# Patient Record
Sex: FEMALE | Race: BLACK OR AFRICAN AMERICAN | Employment: FULL TIME | URBAN - METROPOLITAN AREA
[De-identification: names, ages, dates, MRNs, and addresses within clinical notes are randomized per-mention and may not be internally consistent; named-entity substitution may affect disease eponyms.]

---

## 2018-06-27 ENCOUNTER — APPOINTMENT (OUTPATIENT)
Dept: GENERAL RADIOLOGY | Age: 38
End: 2018-06-27
Attending: PHYSICIAN ASSISTANT
Payer: COMMERCIAL

## 2018-06-27 ENCOUNTER — HOSPITAL ENCOUNTER (EMERGENCY)
Age: 38
Discharge: HOME | End: 2018-06-28
Attending: EMERGENCY MEDICINE
Payer: COMMERCIAL

## 2018-06-27 ENCOUNTER — APPOINTMENT (OUTPATIENT)
Dept: GENERAL RADIOLOGY | Age: 38
End: 2018-06-27
Payer: COMMERCIAL

## 2018-06-27 ENCOUNTER — APPOINTMENT (OUTPATIENT)
Dept: GENERAL RADIOLOGY | Age: 38
End: 2018-06-27
Attending: EMERGENCY MEDICINE
Payer: COMMERCIAL

## 2018-06-27 VITALS
OXYGEN SATURATION: 100 % | DIASTOLIC BLOOD PRESSURE: 99 MMHG | SYSTOLIC BLOOD PRESSURE: 147 MMHG | HEIGHT: 67 IN | WEIGHT: 212.3 LBS | HEART RATE: 71 BPM | TEMPERATURE: 98.8 F | BODY MASS INDEX: 33.32 KG/M2 | RESPIRATION RATE: 17 BRPM

## 2018-06-27 DIAGNOSIS — G89.29 CHRONIC RADICULAR LUMBAR PAIN: ICD-10-CM

## 2018-06-27 DIAGNOSIS — M89.8X1 CHRONIC SCAPULAR PAIN: Primary | ICD-10-CM

## 2018-06-27 DIAGNOSIS — R22.32 MASS OF LEFT FOREARM: ICD-10-CM

## 2018-06-27 DIAGNOSIS — M54.16 CHRONIC RADICULAR LUMBAR PAIN: ICD-10-CM

## 2018-06-27 DIAGNOSIS — G89.29 CHRONIC SCAPULAR PAIN: Primary | ICD-10-CM

## 2018-06-27 LAB
ALBUMIN SERPL-MCNC: 3.7 G/DL (ref 3.5–5)
ALBUMIN/GLOB SERPL: 1 {RATIO} (ref 1.1–2.2)
ALP SERPL-CCNC: 78 U/L (ref 45–117)
ALT SERPL-CCNC: 37 U/L (ref 12–78)
ANION GAP SERPL CALC-SCNC: 5 MMOL/L (ref 5–15)
APPEARANCE UR: CLEAR
AST SERPL-CCNC: 26 U/L (ref 15–37)
BACTERIA URNS QL MICRO: NEGATIVE /HPF
BASOPHILS # BLD: 0.1 K/UL (ref 0–0.1)
BASOPHILS NFR BLD: 1 % (ref 0–1)
BILIRUB SERPL-MCNC: 0.3 MG/DL (ref 0.2–1)
BILIRUB UR QL: NEGATIVE
BUN SERPL-MCNC: 8 MG/DL (ref 6–20)
BUN/CREAT SERPL: 9 (ref 12–20)
CALCIUM SERPL-MCNC: 8.9 MG/DL (ref 8.5–10.1)
CHLORIDE SERPL-SCNC: 101 MMOL/L (ref 97–108)
CK MB CFR SERPL CALC: NORMAL % (ref 0–2.5)
CK MB SERPL-MCNC: <1 NG/ML (ref 5–25)
CK SERPL-CCNC: 200 U/L (ref 26–192)
CO2 SERPL-SCNC: 30 MMOL/L (ref 21–32)
COLOR UR: NORMAL
CREAT SERPL-MCNC: 0.88 MG/DL (ref 0.55–1.02)
D DIMER PPP FEU-MCNC: 0.22 MG/L FEU (ref 0–0.65)
DIFFERENTIAL METHOD BLD: ABNORMAL
EOSINOPHIL # BLD: 0.4 K/UL (ref 0–0.4)
EOSINOPHIL NFR BLD: 6 % (ref 0–7)
EPITH CASTS URNS QL MICRO: NORMAL /LPF
ERYTHROCYTE [DISTWIDTH] IN BLOOD BY AUTOMATED COUNT: 13.9 % (ref 11.5–14.5)
GLOBULIN SER CALC-MCNC: 3.8 G/DL (ref 2–4)
GLUCOSE SERPL-MCNC: 109 MG/DL (ref 65–100)
GLUCOSE UR STRIP.AUTO-MCNC: NEGATIVE MG/DL
HCG UR QL: NEGATIVE
HCT VFR BLD AUTO: 34 % (ref 35–47)
HGB BLD-MCNC: 11 G/DL (ref 11.5–16)
HGB UR QL STRIP: NEGATIVE
HYALINE CASTS URNS QL MICRO: NORMAL /LPF (ref 0–5)
IMM GRANULOCYTES # BLD: 0 K/UL (ref 0–0.04)
IMM GRANULOCYTES NFR BLD AUTO: 1 % (ref 0–0.5)
KETONES UR QL STRIP.AUTO: NEGATIVE MG/DL
LEUKOCYTE ESTERASE UR QL STRIP.AUTO: NEGATIVE
LYMPHOCYTES # BLD: 1.3 K/UL (ref 0.8–3.5)
LYMPHOCYTES NFR BLD: 22 % (ref 12–49)
MCH RBC QN AUTO: 27.7 PG (ref 26–34)
MCHC RBC AUTO-ENTMCNC: 32.4 G/DL (ref 30–36.5)
MCV RBC AUTO: 85.6 FL (ref 80–99)
MONOCYTES # BLD: 0.4 K/UL (ref 0–1)
MONOCYTES NFR BLD: 8 % (ref 5–13)
NEUTS SEG # BLD: 3.7 K/UL (ref 1.8–8)
NEUTS SEG NFR BLD: 63 % (ref 32–75)
NITRITE UR QL STRIP.AUTO: NEGATIVE
NRBC # BLD: 0 K/UL (ref 0–0.01)
NRBC BLD-RTO: 0 PER 100 WBC
PH UR STRIP: 6 [PH] (ref 5–8)
PLATELET # BLD AUTO: 483 K/UL (ref 150–400)
PMV BLD AUTO: 10 FL (ref 8.9–12.9)
POTASSIUM SERPL-SCNC: 3.8 MMOL/L (ref 3.5–5.1)
PROT SERPL-MCNC: 7.5 G/DL (ref 6.4–8.2)
PROT UR STRIP-MCNC: NEGATIVE MG/DL
RBC # BLD AUTO: 3.97 M/UL (ref 3.8–5.2)
RBC #/AREA URNS HPF: NORMAL /HPF (ref 0–5)
SODIUM SERPL-SCNC: 136 MMOL/L (ref 136–145)
SP GR UR REFRACTOMETRY: 1.01 (ref 1–1.03)
TROPONIN I SERPL-MCNC: <0.05 NG/ML
UA: UC IF INDICATED,UAUC: NORMAL
UROBILINOGEN UR QL STRIP.AUTO: 0.2 EU/DL (ref 0.2–1)
WBC # BLD AUTO: 5.9 K/UL (ref 3.6–11)
WBC URNS QL MICRO: NORMAL /HPF (ref 0–4)

## 2018-06-27 PROCEDURE — 80053 COMPREHEN METABOLIC PANEL: CPT

## 2018-06-27 PROCEDURE — 81001 URINALYSIS AUTO W/SCOPE: CPT | Performed by: PHYSICIAN ASSISTANT

## 2018-06-27 PROCEDURE — 99284 EMERGENCY DEPT VISIT MOD MDM: CPT

## 2018-06-27 PROCEDURE — 74011250637 HC RX REV CODE- 250/637: Performed by: EMERGENCY MEDICINE

## 2018-06-27 PROCEDURE — 71046 X-RAY EXAM CHEST 2 VIEWS: CPT

## 2018-06-27 PROCEDURE — 82550 ASSAY OF CK (CPK): CPT

## 2018-06-27 PROCEDURE — 84484 ASSAY OF TROPONIN QUANT: CPT

## 2018-06-27 PROCEDURE — 81025 URINE PREGNANCY TEST: CPT | Performed by: PHYSICIAN ASSISTANT

## 2018-06-27 PROCEDURE — 85025 COMPLETE CBC W/AUTO DIFF WBC: CPT

## 2018-06-27 PROCEDURE — 36415 COLL VENOUS BLD VENIPUNCTURE: CPT

## 2018-06-27 PROCEDURE — 93005 ELECTROCARDIOGRAM TRACING: CPT

## 2018-06-27 PROCEDURE — 82553 CREATINE MB FRACTION: CPT

## 2018-06-27 PROCEDURE — 72100 X-RAY EXAM L-S SPINE 2/3 VWS: CPT

## 2018-06-27 PROCEDURE — 85379 FIBRIN DEGRADATION QUANT: CPT

## 2018-06-27 PROCEDURE — 74011636637 HC RX REV CODE- 636/637: Performed by: EMERGENCY MEDICINE

## 2018-06-27 RX ORDER — DIAZEPAM 5 MG/1
5 TABLET ORAL
Status: COMPLETED | OUTPATIENT
Start: 2018-06-27 | End: 2018-06-27

## 2018-06-27 RX ORDER — PAROXETINE HYDROCHLORIDE 20 MG/1
20 TABLET, FILM COATED ORAL DAILY
COMMUNITY
End: 2018-10-26 | Stop reason: ALTCHOICE

## 2018-06-27 RX ORDER — DIAZEPAM 5 MG/1
5 TABLET ORAL ONCE
Qty: 1 TAB | Refills: 0 | Status: SHIPPED | OUTPATIENT
Start: 2018-06-27 | End: 2018-06-27

## 2018-06-27 RX ORDER — PREDNISONE 10 MG/1
TABLET ORAL
Qty: 48 TAB | Refills: 0 | Status: SHIPPED | OUTPATIENT
Start: 2018-06-27 | End: 2018-10-26 | Stop reason: ALTCHOICE

## 2018-06-27 RX ORDER — PREDNISONE 20 MG/1
60 TABLET ORAL
Status: COMPLETED | OUTPATIENT
Start: 2018-06-27 | End: 2018-06-27

## 2018-06-27 RX ORDER — LIDOCAINE 50 MG/G
1 PATCH TOPICAL
Status: DISCONTINUED | OUTPATIENT
Start: 2018-06-27 | End: 2018-06-28 | Stop reason: HOSPADM

## 2018-06-27 RX ORDER — LIDOCAINE 50 MG/G
PATCH TOPICAL
Qty: 5 EACH | Refills: 0 | Status: SHIPPED | OUTPATIENT
Start: 2018-06-27 | End: 2018-10-26 | Stop reason: ALTCHOICE

## 2018-06-27 RX ADMIN — DIAZEPAM 5 MG: 5 TABLET ORAL at 23:32

## 2018-06-27 RX ADMIN — PREDNISONE 60 MG: 20 TABLET ORAL at 23:32

## 2018-06-27 NOTE — LETTER
[address] 
[facility] EMERGENCY DEPT 
[address] [place] [address] [phone] [id number] [phone] Work/School Note Date: 6/27/2018 To Whom It May concern: 
 
Carmel Chavez was seen and treated today in the emergency room by the following provider(s): 
Attending Provider: Arian Miranda MD. Carmel Chavez may return to work on 6/29/18.  
 
Sincerely, 
 
 
 
 
Arian Miranda MD

## 2018-06-27 NOTE — ED PROVIDER NOTES
PROVIDER IN TRIAGE NOTE:  5:39 PM  Vimal Wilson PA-C has evaluated the patient as the Provider in Triage (PIT). Pt presents to the Emergency Dept with c/o low back pain x 2 weeks which has progressively worsened over the last 3 days. Denied urinary complaints. They have reviewed the vital signs and the triage nurse assessment. They have talked with the patient and any available family and advised that the appropriate studies have been ordered to initiate the work up based on the clinical presentation during the assessment. The pt has been advised that they will be accommodated in the Main ED as soon as possible. The pt has been requested to contact the triage nurse or PIT immediately if they experiences any changes in their condition during this brief waiting period. I have seen and evaluated this patient in the Express Care portion of triage for upper back/SOB. The patients care will begin now and orders have been placed. This patient will be seen and provided further care in the Emergency Room. Jeremie Olivares PA-C    EMERGENCY DEPARTMENT HISTORY AND PHYSICAL EXAM           Date: 6/27/2018  Patient Name: Alyce Sam    History of Presenting Illness     Chief Complaint   Patient presents with    Back Pain     x2 weeks that \"has gotten worse over the past 3 days\"; pt reports pain is bilateral but is \"more on the right\"; pt denies urinary symptoms    Other     \"i also have a knot on my left forarm but it doesnt hurt. can you also look at my left pinky amonth ago it hurts\"       History Provided By: Patient    HPI: Alyce Sam is a 34 y.o. female, pmhx fibromyalgia, who presents ambulatory to the ED c/o intermittent episodes of diffuse lumbar back pain over the last month. Pt states her sxs have worsened over the last 2 days and began to radiate down her RLE. On further questioning, pt notes additional intermittent L sided mid-thoracic back pain.  She denies any recent follow up with her PCP, stating they are located in [facility] which is inconvenient as she recently moved to the [facility]. Pt notes taking Tramadol and Aleve with no relief of sxs. She reports constipation secondary to her Tramadol use, stating she takes Colace and Ex-Lax PRN. Additionally pt c/o a \"bump\" to her L forearm that intermittently appears. Pt otherwise specifically denies any recent fevers, chills, nausea, vomiting, diarrhea, abd pain, CP, SOB, urinary sxs, changes in BM, or headache. PCP: Brock Diaz MD    PMHx: Significant for fibromyalgia  Social Hx: -tobacco, -EtOH, -Illicit Drugs    There are no other complaints, changes, or physical findings at this time. Current Facility-Administered Medications   Medication Dose Route Frequency Provider Last Rate Last Dose    lidocaine (LIDODERM) 5 % patch 1 Patch  1 Patch TransDERmal NOW Margaret Tobar MD         Current Outpatient Prescriptions   Medication Sig Dispense Refill    clonazepam (KLONOPIN PO) Take  by mouth.  PARoxetine (PAXIL) 20 mg tablet Take 20 mg by mouth daily.  predniSONE (STERAPRED DS) 10 mg dose pack Take as directed on packaging 48 Tab 0    lidocaine (LIDODERM) 5 % Apply patch to the affected area for 12 hours a day and remove for 12 hours a day. 5 Each 0       Past History     Past Medical History:  Past Medical History:   Diagnosis Date    Fibromyalgia        Past Surgical History:  History reviewed. No pertinent surgical history. Family History:  History reviewed. No pertinent family history. Social History:  Social History   Substance Use Topics    Smoking status: Never Smoker    Smokeless tobacco: Never Used    Alcohol use No       Allergies: Allergies   Allergen Reactions    Advil Pm [Ibuprofen-Diphenhydramine Cit] Hives     Can take benadryl without issue    Percocet [Oxycodone-Acetaminophen] Itching    Tobramycin Swelling         Review of Systems   Review of Systems   Constitutional: Negative.   Negative for fever.   Eyes: Negative. Respiratory: Negative. Negative for shortness of breath. Cardiovascular: Negative for chest pain. Gastrointestinal: Positive for constipation. Negative for abdominal pain, nausea and vomiting. Endocrine: Negative. Genitourinary: Negative. Negative for difficulty urinating, dysuria and hematuria. Musculoskeletal: Positive for back pain. Skin: Negative. +L forearm bump   Allergic/Immunologic: Negative. Neurological: Negative. Psychiatric/Behavioral: Negative for suicidal ideas. All other systems reviewed and are negative. Physical Exam   Physical Exam   Constitutional: She is oriented to person, place, and time. She appears well-developed and well-nourished. No distress. HENT:   Head: Normocephalic and atraumatic. Nose: Nose normal.   Eyes: Conjunctivae and EOM are normal. Pupils are equal, round, and reactive to light. No scleral icterus. Neck: Normal range of motion. No tracheal deviation present. Cardiovascular: Normal rate, regular rhythm, normal heart sounds and intact distal pulses. Exam reveals no friction rub. No murmur heard. Pulmonary/Chest: Effort normal and breath sounds normal. No stridor. No respiratory distress. She has no wheezes. She has no rales. Abdominal: Soft. Bowel sounds are normal. She exhibits no distension. There is no tenderness. There is no rebound. Musculoskeletal: Normal range of motion. Cervical back: She exhibits tenderness (To Medial aspect of L scapula). Lumbar back: She exhibits pain (Lower back NTTP, no step offs or deformity, or evidence of recent trauma). Back:         Left forearm: She exhibits no tenderness, no bony tenderness and no swelling. Arms:       Left hand: She exhibits tenderness and swelling. She exhibits normal range of motion, normal capillary refill and no deformity. Hands:  Neurological: She is alert and oriented to person, place, and time.  No cranial nerve deficit. Skin: Skin is warm and dry. No rash noted. She is not diaphoretic. Psychiatric: She has a normal mood and affect. Her speech is normal and behavior is normal. Judgment and thought content normal. Cognition and memory are normal.   Nursing note and vitals reviewed. Diagnostic Study Results     Labs -     Recent Results (from the past 12 hour(s))   URINALYSIS W/ REFLEX CULTURE    Collection Time: 06/27/18  5:52 PM   Result Value Ref Range    Color YELLOW/STRAW      Appearance CLEAR CLEAR      Specific gravity 1.008 1.003 - 1.030      pH (UA) 6.0 5.0 - 8.0      Protein NEGATIVE  NEG mg/dL    Glucose NEGATIVE  NEG mg/dL    Ketone NEGATIVE  NEG mg/dL    Bilirubin NEGATIVE  NEG      Blood NEGATIVE  NEG      Urobilinogen 0.2 0.2 - 1.0 EU/dL    Nitrites NEGATIVE  NEG      Leukocyte Esterase NEGATIVE  NEG      WBC 0-4 0 - 4 /hpf    RBC 0-5 0 - 5 /hpf    Epithelial cells FEW FEW /lpf    Bacteria NEGATIVE  NEG /hpf    UA:UC IF INDICATED CULTURE NOT INDICATED BY UA RESULT CNI      Hyaline cast 0-2 0 - 5 /lpf   HCG URINE, QL    Collection Time: 06/27/18  5:52 PM   Result Value Ref Range    HCG urine, QL NEGATIVE  NEG     CBC WITH AUTOMATED DIFF    Collection Time: 06/27/18  6:58 PM   Result Value Ref Range    WBC 5.9 3.6 - 11.0 K/uL    RBC 3.97 3.80 - 5.20 M/uL    HGB 11.0 (L) 11.5 - 16.0 g/dL    HCT 34.0 (L) 35.0 - 47.0 %    MCV 85.6 80.0 - 99.0 FL    MCH 27.7 26.0 - 34.0 PG    MCHC 32.4 30.0 - 36.5 g/dL    RDW 13.9 11.5 - 14.5 %    PLATELET 805 (H) 973 - 400 K/uL    MPV 10.0 8.9 - 12.9 FL    NRBC 0.0 0  WBC    ABSOLUTE NRBC 0.00 0.00 - 0.01 K/uL    NEUTROPHILS 63 32 - 75 %    LYMPHOCYTES 22 12 - 49 %    MONOCYTES 8 5 - 13 %    EOSINOPHILS 6 0 - 7 %    BASOPHILS 1 0 - 1 %    IMMATURE GRANULOCYTES 1 (H) 0.0 - 0.5 %    ABS. NEUTROPHILS 3.7 1.8 - 8.0 K/UL    ABS. LYMPHOCYTES 1.3 0.8 - 3.5 K/UL    ABS. MONOCYTES 0.4 0.0 - 1.0 K/UL    ABS. EOSINOPHILS 0.4 0.0 - 0.4 K/UL    ABS.  BASOPHILS 0.1 0.0 - 0.1 K/UL    ABS. IMM. GRANS. 0.0 0.00 - 0.04 K/UL    DF AUTOMATED     METABOLIC PANEL, COMPREHENSIVE    Collection Time: 06/27/18  6:58 PM   Result Value Ref Range    Sodium 136 136 - 145 mmol/L    Potassium 3.8 3.5 - 5.1 mmol/L    Chloride 101 97 - 108 mmol/L    CO2 30 21 - 32 mmol/L    Anion gap 5 5 - 15 mmol/L    Glucose 109 (H) 65 - 100 mg/dL    BUN 8 6 - 20 MG/DL    Creatinine 0.88 0.55 - 1.02 MG/DL    BUN/Creatinine ratio 9 (L) 12 - 20      GFR est AA >60 >60 ml/min/1.73m2    GFR est non-AA >60 >60 ml/min/1.73m2    Calcium 8.9 8.5 - 10.1 MG/DL    Bilirubin, total 0.3 0.2 - 1.0 MG/DL    ALT (SGPT) 37 12 - 78 U/L    AST (SGOT) 26 15 - 37 U/L    Alk. phosphatase 78 45 - 117 U/L    Protein, total 7.5 6.4 - 8.2 g/dL    Albumin 3.7 3.5 - 5.0 g/dL    Globulin 3.8 2.0 - 4.0 g/dL    A-G Ratio 1.0 (L) 1.1 - 2.2     D DIMER    Collection Time: 06/27/18  6:58 PM   Result Value Ref Range    D-dimer 0.22 0.00 - 0.65 mg/L FEU   TROPONIN I    Collection Time: 06/27/18  6:58 PM   Result Value Ref Range    Troponin-I, Qt. <0.05 <0.05 ng/mL   CK W/ REFLX CKMB    Collection Time: 06/27/18  6:58 PM   Result Value Ref Range     (H) 26 - 192 U/L   CK-MB,QUANT.     Collection Time: 06/27/18  6:58 PM   Result Value Ref Range    CK - MB <1.0 <3.6 NG/ML    CK-MB Index Cannot be calculated 0 - 2.5     EKG, 12 LEAD, INITIAL    Collection Time: 06/27/18  7:04 PM   Result Value Ref Range    Ventricular Rate 63 BPM    Atrial Rate 63 BPM    P-R Interval 160 ms    QRS Duration 84 ms    Q-T Interval 402 ms    QTC Calculation (Bezet) 411 ms    Calculated P Axis 36 degrees    Calculated R Axis 67 degrees    Calculated T Axis 28 degrees    Diagnosis       Normal sinus rhythm  Normal ECG  No previous ECGs available         Radiologic Studies -     XR SPINE LUMB 2 OR 3 V   Final Result     EXAM:  XR SPINE LUMB 2 OR 3 V     INDICATION:   low back pain     COMPARISON: None.     FINDINGS: AP, lateral and spot lateral views of the lumbar spine demonstrate  normal alignment. The vertebral body heights and disc spaces are  well-preserved. There is no fracture, subluxation or other abnormality.      IMPRESSION  IMPRESSION:  Unremarkable lumbar spine. CXR Results  (Last 48 hours)               06/27/18 1929  XR CHEST PA LAT Final result    Impression:  Impression:   No acute cardiopulmonary process. Narrative:  Chest PA and lateral       History: Thoracic back pain. Short of breath. Comparison: None       Findings: The lungs are well expanded. No focal consolidation, pleural   effusion, or pneumothorax. The cardiomediastinal silhouette is unremarkable. The visualized osseous structures are unremarkable. Medical Decision Making   I am the first provider for this patient. I reviewed the vital signs, available nursing notes, past medical history, past surgical history, family history and social history. Vital Signs-Reviewed the patient's vital signs. Patient Vitals for the past 12 hrs:   Temp Pulse Resp BP SpO2   06/27/18 2042 - 71 17 (!) 147/99 100 %   06/27/18 1737 98.8 °F (37.1 °C) 73 16 (!) 149/95 99 %     Records Reviewed: Nursing Notes and Old Medical Records    Provider Notes (Medical Decision Making):     DDX:  Acute on chronic pain, bone spur, contusion, hand sprain, PE, pleurisy, pna, uti    Plan:  Labs, imaging, ua, analgesic    Impression:  Acute on chronic back pain     ED Course:   Initial assessment performed. The patients presenting problems have been discussed, and they are in agreement with the care plan formulated and outlined with them. I have encouraged them to ask questions as they arise throughout their visit.     I reviewed our electronic medical record system for any past medical records that were available that may contribute to the patients current condition, the nursing notes and and vital signs from today's visit    Nursing notes will be reviewed as they become available in realtime while the pt has been in the ED. Radha Coreas MD    EKG interpretation 7625: NSR, nl Axis, rate 63; , QRS 84, QTc 411; no acute ischemia; Radha Coreas MD    I personally reviewed pt's imaging. Official read by radiology noted above. Radha Coreas MD    PROGRESS NOTE:  11:08 PM  Pt refusing x-ray of L forearm at this time. Written by Kirstie Aparicio, ED Scribe, as dictated by Radha Coreas MD    PROGRESS NOTE:  11:38 PM  Pt requesting discharge at this time. She states her family has left, noting she is now driving herself home. Will discontinue Valium tx in ED. Written by Kirstie Aparicio, ED Scribe, as dictated by Radha Coreas MD     11:56 PM  Progress note:  Pt noted her sx significantly improved throughout ED stay, ready for discharge. Discussed lab and imaging findings with pt, specifically noting otherwise unremarkable w/u and previous dx of radiculopathy from pcp several years ago, discussed likely acute flare and plan for f/u with pcp. Pt will follow up as instructed. All questions have been answered, pt voiced understanding and agreement with plan. If narcotics were prescribed, pt was advised not to drive or operate heavy machinery. If abx were prescribed, pt advised that diarrhea and rash are possible side effects of the medications. Specific return precautions provided in addition to instructions for pt to return to the ED immediately should sx worsen at any time. Radha Coreas MD      Diagnosis     Clinical Impression:   1. Chronic scapular pain    2. Mass of left forearm    3. Chronic radicular lumbar pain        PLAN:  1.    Discharge Medication List as of 6/27/2018 11:34 PM      START taking these medications    Details   predniSONE (STERAPRED DS) 10 mg dose pack Take as directed on packaging, Print, Disp-48 Tab, R-0      lidocaine (LIDODERM) 5 % Apply patch to the affected area for 12 hours a day and remove for 12 hours a day., Print, Disp-5 Each, R-0 CONTINUE these medications which have NOT CHANGED    Details   clonazepam (KLONOPIN PO) Take  by mouth., Historical Med      PARoxetine (PAXIL) 20 mg tablet Take 20 mg by mouth daily. , Historical Med           2. Follow-up Information     Follow up With Details Comments Harmony Hoffman MD Schedule an appointment as soon as possible for a visit in 2 days  [address] [place] [address]  [phone]          Return to ED if worse     Disposition:    DISCHARGE NOTE:  11:56 PM  The patient's results have been reviewed with family and/or caregiver. They verbally convey their understanding and agreement of the patient's signs, symptoms, diagnosis, treatment, and prognosis. They additionally agree to follow up as recommended in the discharge instructions or to return to the Emergency Room should the patient's condition change prior to their follow-up appointment. The family and/or caregiver verbally agrees with the care-plan and all of their questions have been answered. The discharge instructions have also been provided to the them along with educational information regarding the patient's diagnosis and a list of reasons why the patient would want to return to the ER prior to their follow-up appointment should their condition change. Written by Carmen Lopez ED Scribe, as dictated by Jeramie Paredes MD.             Attestations: This note is prepared by Carmen Lopez, acting as Scribe for MD Jeramie Murphy MD : The scribe's documentation has been prepared under my direction and personally reviewed by me in its entirety. I confirm that the note above accurately reflects all work, treatment, procedures, and medical decision making performed by me. This note will not be viewable in [address]

## 2018-06-28 LAB
ATRIAL RATE: 63 BPM
CALCULATED P AXIS, ECG09: 36 DEGREES
CALCULATED R AXIS, ECG10: 67 DEGREES
CALCULATED T AXIS, ECG11: 28 DEGREES
DIAGNOSIS, 93000: NORMAL
P-R INTERVAL, ECG05: 160 MS
Q-T INTERVAL, ECG07: 402 MS
QRS DURATION, ECG06: 84 MS
QTC CALCULATION (BEZET), ECG08: 411 MS
VENTRICULAR RATE, ECG03: 63 BPM

## 2018-06-28 NOTE — DISCHARGE INSTRUCTIONS
Back Pain: Care Instructions  Your Care Instructions    Back pain has many possible causes. It is often related to problems with muscles and ligaments of the back. It may also be related to problems with the nerves, discs, or bones of the back. Moving, lifting, standing, sitting, or sleeping in an awkward way can strain the back. Sometimes you don't notice the injury until later. Arthritis is another common cause of back pain. Although it may hurt a lot, back pain usually improves on its own within several weeks. Most people recover in 12 weeks or less. Using good home treatment and being careful not to stress your back can help you feel better sooner. Follow-up care is a key part of your treatment and safety. Be sure to make and go to all appointments, and call your doctor if you are having problems. It's also a good idea to know your test results and keep a list of the medicines you take. How can you care for yourself at home? · Sit or lie in positions that are most comfortable and reduce your pain. Try one of these positions when you lie down:  ¨ Lie on your back with your knees bent and supported by large pillows. ¨ Lie on the floor with your legs on the seat of a sofa or chair. [place] on your side with your knees and hips bent and a pillow between your legs. ¨ Lie on your stomach if it does not make pain worse. · Do not sit up in bed, and avoid soft couches and twisted positions. Bed rest can help relieve pain at first, but it delays healing. Avoid bed rest after the first day of back pain. · Change positions every 30 minutes. If you must sit for long periods of time, take breaks from sitting. Get up and walk around, or lie in a comfortable position. · Try using a heating pad on a low or medium setting for 15 to 20 minutes every 2 or 3 hours. Try a warm shower in place of one session with the heating pad. · You can also try an ice pack for 10 to 15 minutes every 2 to 3 hours.  Put a thin cloth between the ice pack and your skin. · Take pain medicines exactly as directed. ¨ If the doctor gave you a prescription medicine for pain, take it as prescribed. ¨ If you are not taking a prescription pain medicine, ask your doctor if you can take an over-the-counter medicine. · Take short walks several times a day. You can start with 5 to 10 minutes, 3 or 4 times a day, and work up to longer walks. Walk on level surfaces and avoid hills and stairs until your back is better. · Return to work and other activities as soon as you can. Continued rest without activity is usually not good for your back. · To prevent future back pain, do exercises to stretch and strengthen your back and stomach. Learn how to use good posture, safe lifting techniques, and proper body mechanics. When should you call for help? Call your doctor now or seek immediate medical care if:  ? · You have new or worsening numbness in your legs. ? · You have new or worsening weakness in your legs. (This could make it hard to stand up.)   ? · You lose control of your bladder or bowels. ? Watch closely for changes in your health, and be sure to contact your doctor if:  ? · Your pain gets worse. ? · You are not getting better after 2 weeks. Where can you learn more? Go to http://nicolle-sherlyn.info/. Enter Z447 in the search box to learn more about \"Back Pain: Care Instructions. \"  Current as of: March 21, 2017  Content Version: 11.4  © 8644-8665 [employer]. Care instructions adapted under license by [employer] (which disclaims liability or warranty for this information). If you have questions about a medical condition or this instruction, always ask your healthcare professional. [place] [address] any warranty or liability for your use of this information.          Acute Low Back Pain: Exercises  Your Care Instructions  Here are some examples of typical rehabilitation exercises for your condition. Start each exercise slowly. Ease off the exercise if you start to have pain. Your doctor or physical therapist will tell you when you can start these exercises and which ones will work best for you. When you are not being active, find a comfortable position for rest. Some people are comfortable on the floor or a medium-firm bed with a small pillow under their head and another under their knees. Some people prefer to lie on their side with a pillow between their knees. Don't stay in one position for too long. Take short walks (10 to 20 minutes) every 2 to 3 hours. Avoid slopes, hills, and stairs until you feel better. Walk only distances you can manage without pain, especially leg pain. How to do the exercises  Back stretches    1. Get down on your hands and knees on the floor. 2. Relax your head and allow it to droop. Round your back up toward the ceiling until you feel a nice stretch in your upper, middle, and lower back. Hold this stretch for as long as it feels comfortable, or about 15 to 30 seconds. 3. Return to the starting position with a flat back while you are on your hands and knees. 4. Let your back sway by pressing your stomach toward the floor. Lift your buttocks toward the ceiling. 5. Hold this position for 15 to 30 seconds. 6. Repeat 2 to 4 times. Follow-up care is a key part of your treatment and safety. Be sure to make and go to all appointments, and call your doctor if you are having problems. It's also a good idea to know your test results and keep a list of the medicines you take. Where can you learn more? Go to http://nicolle-sherlyn.info/. Enter O066 in the search box to learn more about \"Acute Low Back Pain: Exercises. \"  Current as of: March 21, 2017  Content Version: 11.4  © 8433-8922 [employer]. Care instructions adapted under license by [employer] (which disclaims liability or warranty for this information).  If you have questions about a medical condition or this instruction, always ask your healthcare professional. [place] [address] any warranty or liability for your use of this information.

## 2018-06-28 NOTE — ED NOTES
Pt stating \"i want to leave I havent had any of my results and have been here for like 6 hours with no pain relief I just want to go home, I have my kids waiting on me and they have stuff to do in the morning. \"    Pt medicated with prednisone and lidocaine patch but held valium due to pt driving home.  reviewed discharge instructions with the patient. The patient verbalized understanding. All questions and concerns were addressed. The patient declined a wheelchair and is discharged ambulatory in the care of family members with instructions and prescriptions in hand. Pt is alert and oriented x 4. Respirations are clear and unlabored.

## 2018-06-28 NOTE — ED NOTES
Right flank pain down right leg x 1 month. Also feels like \"left lung is hurting when I lay down and still hurts when I'm sitting up. \" Taking Tramadol and Aleve without relief. No urinary symptoms. \"Not active, hands started to swell. \" Has happened before when she was active, recommended antihistamine - a few years ago.

## 2018-10-15 ENCOUNTER — HOSPITAL ENCOUNTER (OUTPATIENT)
Dept: MAMMOGRAPHY | Age: 38
Discharge: HOME | End: 2018-10-15
Payer: COMMERCIAL

## 2018-10-15 DIAGNOSIS — Z12.31 VISIT FOR SCREENING MAMMOGRAM: ICD-10-CM

## 2018-10-15 PROCEDURE — 77067 SCR MAMMO BI INCL CAD: CPT

## 2018-10-26 ENCOUNTER — OFFICE VISIT (OUTPATIENT)
Dept: SURGERY | Age: 38
End: 2018-10-26

## 2018-10-26 VITALS
DIASTOLIC BLOOD PRESSURE: 86 MMHG | BODY MASS INDEX: 35 KG/M2 | HEIGHT: 67 IN | WEIGHT: 223 LBS | HEART RATE: 69 BPM | SYSTOLIC BLOOD PRESSURE: 140 MMHG

## 2018-10-26 DIAGNOSIS — Q83.8 ECTOPIC BREAST TISSUE: Primary | ICD-10-CM

## 2018-10-26 DIAGNOSIS — N60.11 FIBROCYSTIC BREAST CHANGES, RIGHT: ICD-10-CM

## 2018-10-26 RX ORDER — BUPROPION HYDROCHLORIDE 150 MG/1
TABLET ORAL
COMMUNITY
End: 2019-05-13

## 2018-10-26 NOTE — PROGRESS NOTES
HISTORY OF PRESENT ILLNESS  Carmel Gavin is a 34 y.o. female. HPI  NEW patient referred by Dr. Rosi Nassar here for RIGHT palpable and visible lump in area of axilla/lateral RIGHT breast tissue. She has had this for approximately 1 1/2 years, and states size not changed but pain varies depending on menstrual cycle, being worse when having her period.   She states she's had cysts drained from her RIGHT breast in the past,  X2 by her GYN, last time 3-4 years ago, but these were in a different area of the breast. Denies nipple drainage, inversion, dimpling or other breast issues in either breast. Had a BILATERAL screening mammogram last week.     Obstetric History     No data available   Obstetric Comments   Menarche 4, LMP 10/22/2018, # of children 0, age of 1st delivery n/a, Hysterectomy/oophorectomy NO/NO, Breast bx no, history of breast feeding no, BCP yes, Hormone therapy no, but has taken estradiol and progesterone in the past for fertility issues      Family History\"  No history of breast or ovarian cancer.     Breast imaging:  10/18/18: BILATERAL screening mammogram-BIRADS 2        Breast imaging:  10/15/18: BILATERAL breast screening mammogram-BIRADS 2    Past Medical History:   Diagnosis Date    Autoimmune disease ([address]     fibromyalgia    Depression     and anxiety    Fibromyalgia     GERD (gastroesophageal reflux disease)        Past Surgical History:   Procedure Laterality Date    HX GYN      fallopian tube removed after tubal pregnancy    HX GYN      hysteroscopy/laporoscopy to remove schar tissue    HX OTHER SURGICAL      oral surgery for wisdom teeth, root canal       Social History     Socioeconomic History    Marital status:      Spouse name: Not on file    Number of children: Not on file    Years of education: Not on file    Highest education level: Not on file   Social Needs    Financial resource strain: Not on file    Food insecurity - worry: Not on file    Food insecurity - inability: Not on file    Transportation needs - medical: Not on file   [employer] needs - non-medical: Not on file   Occupational History    Not on file   Tobacco Use    Smoking status: Never Smoker    Smokeless tobacco: Never Used   Substance and Sexual Activity    Alcohol use: No    Drug use: No    Sexual activity: Not on file   Other Topics Concern    Not on file   Social History Narrative    Not on file       Current Outpatient Medications on File Prior to Visit   Medication Sig Dispense Refill    L.acid/L.casei/B.bif/B.donnie/FOS (PROBIOTIC BLEND PO) Take  by mouth. From [employer],[address]      OTHER,NON-FORMULARY, Black seed oil 1X/day      glucosam/chond/hyalu/CF borate (MOVE FREE JOINT HEALTH PO) Take  by mouth daily.  buPROPion XL (WELLBUTRIN XL) 150 mg tablet bupropion HCl  mg 24 hr tablet, extended release       No current facility-administered medications on file prior to visit. Allergies   Allergen Reactions    Advil Pm [Ibuprofen-Diphenhydramine Cit] Hives     Can take benadryl without issue    Percocet [Oxycodone-Acetaminophen] Itching    Tobramycin Swelling     Eye drop       OB History     Obstetric Comments    Menarche 4, LMP 10/22/2018, # of children 0, age of 1st delivery n/a, Hysterectomy/oophorectomy NO/NO, Breast bx no, history of breast feeding no, BCP yes, Hormone therapy no, but has taken estradiol and progesterone in the past for fertility issues        ROS  Constitutional: Negative. HENT: Negative. Eyes: Negative. Respiratory: Negative. Cardiovascular: Negative. Gastrointestinal: Negative. Genitourinary: Negative. Musculoskeletal: Positive for joint pain and myalgias. Skin: Negative. Neurological: Negative. Endo/Heme/Allergies: Negative. Psychiatric/Behavioral: The patient is nervous/anxious. Physical Exam   Cardiovascular: Normal rate, regular rhythm and normal heart sounds.    Pulmonary/Chest: Breath sounds normal. Right breast exhibits mass and tenderness (axilla tender to US probe). Right breast exhibits no inverted nipple, no nipple discharge and no skin change. Left breast exhibits no inverted nipple, no mass, no nipple discharge, no skin change and no tenderness. Breasts are symmetrical.       Lymphadenopathy:        Right cervical: No superficial cervical, no deep cervical and no posterior cervical adenopathy present. Left cervical: No superficial cervical, no deep cervical and no posterior cervical adenopathy present. She has no axillary adenopathy. Right axillary: No pectoral and no lateral adenopathy present. Left axillary: No pectoral and no lateral adenopathy present. BREAST ULTRASOUND  Indication: Right axillary mass  Technique: The area was scanned using a high-frequency linear-array near-field transducer  Findings: Normal breast tissue  Impression: Ectopic breast tissue, no worrisome findings  Disposition: Discussed options including observation and excision. Pt will call to schedule excision. ASSESSMENT and PLAN    ICD-10-CM ICD-9-CM    1. Ectopic breast tissue Q83.8 757.6    2. Fibrocystic breast changes, right N60.11 610.1       New patient presents for evaluation of symptomatic RIGHT axillary mass x1.5 years. She notes that this is more painful and swollen just before her period. Ectopic breast tissue noted on exam. US visualizes normal ectopic breast tissue. Assured pt that this is normal breast tissue, and is relatively common. Discussed excision with elliptical incision. Pt notes that she generally has keloids after surgery - reviewed that steroid injection may reduce risk of keloid. Reviewed recovery time of about 1 week following surgery. Alternately, pt may be able to see plastic surgeon for liposuction of this area. No surgical urgency for excision - pt states that she may like to schedule this for early 2019, and will call to schedule.  In the meantime, will treat with Vitamin E and Primrose oil for breast pain. F/U PRN. This plan was reviewed with the patient and patient agrees. All questions were answered.     Written by Niall Palm, as dictated by Dr. Tejas Sadler MD.

## 2018-10-26 NOTE — LETTER
NOTIFICATION RETURN TO WORK 
 
10/26/2018 11:30 AM 
 
Ms. Norma Badillo [address] [id number] To Whom It May Concern: 
 
Carmel Frederick is currently under the care of [address] [place] [address]. Ms. Eric Frederick had an appointment with Dr. Jayson Guaman on Friday [id number]. Please excuse her from work during this time. If there are questions or concerns please have the patient contact our office.  
 
 
 
Sincerely, 
 
 
 
 
Anitra Hensley MD

## 2018-10-26 NOTE — PATIENT INSTRUCTIONS

## 2018-10-26 NOTE — PROGRESS NOTES
HISTORY OF PRESENT ILLNESS Christian Hearn is a 34 y.o. female. HPI  NEW patient referred by Dr. Maggie French here for RIGHT palpable and visible lump in area of axilla/lateral RIGHT breast tissue. She has had this for approximately 1 1/2 years, and states size not changed but pain varies depending on menstrual cycle, being worse when having her period. She states she's had cysts drained from her RIGHT breast in the past,  X2 by her GYN, last time 3-4 years ago, but these were in a different area of the breast. Denies nipple drainage, inversion, dimpling or other breast issues in either breast. Had a BILATERAL screening mammogram last week. Obstetric History No data available Obstetric Comments Menarche 4, LMP 10/22/2018, # of children 0, age of 1st delivery n/a, Hysterectomy/oophorectomy NO/NO, Breast bx no, history of breast feeding no, BCP yes, Hormone therapy no, but has taken estradiol and progesterone in the past for fertility issues Family History\" No history of breast or ovarian cancer. Breast imaging: 
10/18/18: BILATERAL screening mammogram-BIRADS 2 Breast imaging: 
10/15/18: BILATERAL breast screening mammogram-BIRADS 2 Review of Systems Constitutional: Negative. HENT: Negative. Eyes: Negative. Respiratory: Negative. Cardiovascular: Negative. Gastrointestinal: Negative. Genitourinary: Negative. Musculoskeletal: Positive for joint pain and myalgias. Skin: Negative. Neurological: Negative. Endo/Heme/Allergies: Negative. Psychiatric/Behavioral: The patient is nervous/anxious. Physical Exam 
 
ASSESSMENT and PLAN 
{ASSESSMENT/PLAN:19878}

## 2018-10-26 NOTE — LETTER
10/31/2018 9:57 AM 
 
Patient:  Christian Hearn YOB: 1980 Date of Visit: 10/26/2018 Dear Dr. Maggie French: 
 
 
Thank you for referring Ms. Carmel Sabillon to me for evaluation/treatment. Below are the relevant portions of my assessment and plan of care. HISTORY OF PRESENT ILLNESS Christian Hearn is a 34 y.o. female. HPI 
NEW patient referred by Dr. Maggie French here for RIGHT palpable and visible lump in area of axilla/lateral RIGHT breast tissue. She has had this for approximately 1 1/2 years, and states size not changed but pain varies depending on menstrual cycle, being worse when having her period. She states she's had cysts drained from her RIGHT breast in the past,  X2 by her GYN, last time 3-4 years ago, but these were in a different area of the breast. Denies nipple drainage, inversion, dimpling or other breast issues in either breast. Had a BILATERAL screening mammogram last week. 
  
Obstetric History No data available Obstetric Comments Menarche 4, LMP 10/22/2018, # of children 0, age of 1st delivery n/a, Hysterectomy/oophorectomy NO/NO, Breast bx no, history of breast feeding no, BCP yes, Hormone therapy no, but has taken estradiol and progesterone in the past for fertility issues  
  
Family History\" No history of breast or ovarian cancer. 
  
Breast imaging: 
10/18/18: BILATERAL screening mammogram-BIRADS 2 
  
  
Breast imaging: 
10/15/18: BILATERAL breast screening mammogram-B[facility]ADS 2 Past Medical History:  
Diagnosis Date  Autoimmune disease ([address]   
 fibromyalgia  Depression   
 and anxiety  Fibromyalgia  GERD (gastroesophageal reflux disease) Past Surgical History:  
Procedure Laterality Date  HX GYN    
 fallopian tube removed after tubal pregnancy  HX GYN    
 hysteroscopy/laporoscopy to remove schar tissue  HX OTHER SURGICAL    
 oral surgery for wisdom teeth, root canal  
 
 
Social History Socioeconomic History  Marital status:  Spouse name: Not on file  Number of children: Not on file  Years of education: Not on file  Highest education level: Not on file Social Needs  Financial resource strain: Not on file  Food insecurity - worry: Not on file  Food insecurity - inability: Not on file  Transportation needs - medical: Not on file  Transportation needs - non-medical: Not on file Occupational History  Not on file Tobacco Use  Smoking status: Never Smoker  Smokeless tobacco: Never Used Substance and Sexual Activity  Alcohol use: No  
 Drug use: No  
 Sexual activity: Not on file Other Topics Concern  Not on file Social History Narrative  Not on file Current Outpatient Medications on File Prior to Visit Medication Sig Dispense Refill  L.acid/L.casei/B.bif/B.donnie/FOS (PROBIOTIC BLEND PO) Take  by mouth. From [address]    
 OTHER,NON-FORMULARY, Black seed oil 1X/day  glucosam/chond/hyalu/CF borate (MOVE FREE JOINT HEALTH PO) Take  by mouth daily.  buPROPion XL (WELLBUTRIN XL) 150 mg tablet bupropion HCl  mg 24 hr tablet, extended release No current facility-administered medications on file prior to visit. Allergies Allergen Reactions  Advil Pm [Ibuprofen-Diphenhydramine Cit] Hives Can take benadryl without issue  Percocet [Oxycodone-Acetaminophen] Itching  Tobramycin Swelling Eye drop OB History Obstetric Comments Menarche 4, LMP 10/22/2018, # of children 0, age of 1st delivery n/a, Hysterectomy/oophorectomy NO/NO, Breast bx no, history of breast feeding no, BCP yes, Hormone therapy no, but has taken estradiol and progesterone in the past for fertility issues ROS Constitutional: Negative. HENT: Negative. Eyes: Negative. Respiratory: Negative. Cardiovascular: Negative. Gastrointestinal: Negative. Genitourinary: Negative. Musculoskeletal: Positive for joint pain and myalgias. Skin: Negative. Neurological: Negative. Endo/Heme/Allergies: Negative. Psychiatric/Behavioral: The patient is nervous/anxious. Physical Exam  
Cardiovascular: Normal rate, regular rhythm and normal heart sounds. Pulmonary/Chest: Breath sounds normal. Right breast exhibits mass and tenderness (axilla tender to US probe). Right breast exhibits no inverted nipple, no nipple discharge and no skin change. Left breast exhibits no inverted nipple, no mass, no nipple discharge, no skin change and no tenderness. Breasts are symmetrical.  
 
 
Lymphadenopathy:  
     Right cervical: No superficial cervical, no deep cervical and no posterior cervical adenopathy present. Left cervical: No superficial cervical, no deep cervical and no posterior cervical adenopathy present. She has no axillary adenopathy. Right axillary: No pectoral and no lateral adenopathy present. Left axillary: No pectoral and no lateral adenopathy present. BREAST ULTRASOUND Indication: Right axillary mass Technique: The area was scanned using a high-frequency linear-array near-field transducer Findings: Normal breast tissue Impression: Ectopic breast tissue, no worrisome findings Disposition: Discussed options including observation and excision. Pt will call to schedule excision. ASSESSMENT and PLAN 
  ICD-10-CM ICD-9-CM 1. Ectopic breast tissue Q83.8 757.6 2. Fibrocystic breast changes, right N60.11 610.1 New patient presents for evaluation of symptomatic RIGHT axillary mass x1.5 years. She notes that this is more painful and swollen just before her period. Ectopic breast tissue noted on exam. US visualizes normal ectopic breast tissue. Assured pt that this is normal breast tissue, and is relatively common. Discussed excision with elliptical incision.  Pt notes that she generally has keloids after surgery - reviewed that steroid injection may reduce risk of keloid. Reviewed recovery time of about 1 week following surgery. Alternately, pt may be able to see plastic surgeon for liposuction of this area. No surgical urgency for excision - pt states that she may like to schedule this for early 2019, and will call to schedule. In the meantime, will treat with Vitamin E and Primrose oil for breast pain. F/U PRN. This plan was reviewed with the patient and patient agrees. All questions were answered. Written by Rachael Caldera, as dictated by Dr. Chino Schaeffer MD. 
 
 
If you have questions, please do not hesitate to call me. I look forward to following Ms. Franklin Calderon along with you.  
 
 
 
Sincerely, 
 
 
Ruy Vasquez MD

## 2018-10-31 NOTE — COMMUNICATION BODY
HISTORY OF PRESENT ILLNESS  Carmel Frederick is a 34 y.o. female. HPI  NEW patient referred by Dr. Rosie Garvey here for RIGHT palpable and visible lump in area of axilla/lateral RIGHT breast tissue. She has had this for approximately 1 1/2 years, and states size not changed but pain varies depending on menstrual cycle, being worse when having her period.   She states she's had cysts drained from her RIGHT breast in the past,  X2 by her GYN, last time 3-4 years ago, but these were in a different area of the breast. Denies nipple drainage, inversion, dimpling or other breast issues in either breast. Had a BILATERAL screening mammogram last week.     Obstetric History     No data available   Obstetric Comments   Menarche 4, LMP 10/22/2018, # of children 0, age of 1st delivery n/a, Hysterectomy/oophorectomy NO/NO, Breast bx no, history of breast feeding no, BCP yes, Hormone therapy no, but has taken estradiol and progesterone in the past for fertility issues      Family History\"  No history of breast or ovarian cancer.     Breast imaging:  10/18/18: BILATERAL screening mammogram-BIRADS 2        Breast imaging:  10/15/18: BILATERAL breast screening mammogram-B[facility]ADS 2    Past Medical History:   Diagnosis Date    Autoimmune disease ([address]     fibromyalgia    Depression     and anxiety    Fibromyalgia     GERD (gastroesophageal reflux disease)        Past Surgical History:   Procedure Laterality Date    HX GYN      fallopian tube removed after tubal pregnancy    HX GYN      hysteroscopy/laporoscopy to remove schar tissue    HX OTHER SURGICAL      oral surgery for wisdom teeth, root canal       Social History     Socioeconomic History    Marital status:      Spouse name: Not on file    Number of children: Not on file    Years of education: Not on file    Highest education level: Not on file   Social Needs    Financial resource strain: Not on file    Food insecurity - worry: Not on file    Food insecurity - inability: Not on file    Transportation needs - medical: Not on file   [employer] needs - non-medical: Not on file   Occupational History    Not on file   Tobacco Use    Smoking status: Never Smoker    Smokeless tobacco: Never Used   Substance and Sexual Activity    Alcohol use: No    Drug use: No    Sexual activity: Not on file   Other Topics Concern    Not on file   Social History Narrative    Not on file       Current Outpatient Medications on File Prior to Visit   Medication Sig Dispense Refill    L.acid/L.casei/B.bif/B.donnie/FOS (PROBIOTIC BLEND PO) Take  by mouth. From [employer],[address]      OTHER,NON-FORMULARY, Black seed oil 1X/day      glucosam/chond/hyalu/CF borate (MOVE FREE JOINT HEALTH PO) Take  by mouth daily.  buPROPion XL (WELLBUTRIN XL) 150 mg tablet bupropion HCl  mg 24 hr tablet, extended release       No current facility-administered medications on file prior to visit. Allergies   Allergen Reactions    Advil Pm [Ibuprofen-Diphenhydramine Cit] Hives     Can take benadryl without issue    Percocet [Oxycodone-Acetaminophen] Itching    Tobramycin Swelling     Eye drop       OB History     Obstetric Comments    Menarche 4, LMP 10/22/2018, # of children 0, age of 1st delivery n/a, Hysterectomy/oophorectomy NO/NO, Breast bx no, history of breast feeding no, BCP yes, Hormone therapy no, but has taken estradiol and progesterone in the past for fertility issues        ROS  Constitutional: Negative. HENT: Negative. Eyes: Negative. Respiratory: Negative. Cardiovascular: Negative. Gastrointestinal: Negative. Genitourinary: Negative. Musculoskeletal: Positive for joint pain and myalgias. Skin: Negative. Neurological: Negative. Endo/Heme/Allergies: Negative. Psychiatric/Behavioral: The patient is nervous/anxious. Physical Exam   Cardiovascular: Normal rate, regular rhythm and normal heart sounds.    Pulmonary/Chest: Breath sounds normal. Right breast exhibits mass and tenderness (axilla tender to US probe). Right breast exhibits no inverted nipple, no nipple discharge and no skin change. Left breast exhibits no inverted nipple, no mass, no nipple discharge, no skin change and no tenderness. Breasts are symmetrical.       Lymphadenopathy:        Right cervical: No superficial cervical, no deep cervical and no posterior cervical adenopathy present. Left cervical: No superficial cervical, no deep cervical and no posterior cervical adenopathy present. She has no axillary adenopathy. Right axillary: No pectoral and no lateral adenopathy present. Left axillary: No pectoral and no lateral adenopathy present. BREAST ULTRASOUND  Indication: Right axillary mass  Technique: The area was scanned using a high-frequency linear-array near-field transducer  Findings: Normal breast tissue  Impression: Ectopic breast tissue, no worrisome findings  Disposition: Discussed options including observation and excision. Pt will call to schedule excision. ASSESSMENT and PLAN    ICD-10-CM ICD-9-CM    1. Ectopic breast tissue Q83.8 757.6    2. Fibrocystic breast changes, right N60.11 610.1       New patient presents for evaluation of symptomatic RIGHT axillary mass x1.5 years. She notes that this is more painful and swollen just before her period. Ectopic breast tissue noted on exam. US visualizes normal ectopic breast tissue. Assured pt that this is normal breast tissue, and is relatively common. Discussed excision with elliptical incision. Pt notes that she generally has keloids after surgery - reviewed that steroid injection may reduce risk of keloid. Reviewed recovery time of about 1 week following surgery. Alternately, pt may be able to see plastic surgeon for liposuction of this area. No surgical urgency for excision - pt states that she may like to schedule this for early 2019, and will call to schedule.  In the meantime, will treat with Vitamin E and Primrose oil for breast pain. F/U PRN. This plan was reviewed with the patient and patient agrees. All questions were answered.     Written by Micheal Gonzales, as dictated by Dr. Paulo Aguero MD.

## 2019-03-17 ENCOUNTER — APPOINTMENT (OUTPATIENT)
Dept: GENERAL RADIOLOGY | Age: 39
End: 2019-03-17
Attending: EMERGENCY MEDICINE
Payer: COMMERCIAL

## 2019-03-17 ENCOUNTER — HOSPITAL ENCOUNTER (EMERGENCY)
Age: 39
Discharge: HOME | End: 2019-03-17
Attending: EMERGENCY MEDICINE
Payer: COMMERCIAL

## 2019-03-17 VITALS
WEIGHT: 214.07 LBS | RESPIRATION RATE: 18 BRPM | TEMPERATURE: 98.3 F | SYSTOLIC BLOOD PRESSURE: 129 MMHG | HEART RATE: 66 BPM | OXYGEN SATURATION: 100 % | DIASTOLIC BLOOD PRESSURE: 93 MMHG | BODY MASS INDEX: 34.4 KG/M2 | HEIGHT: 66 IN
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
## 2019-03-17 DIAGNOSIS — M25.531 RIGHT WRIST PAIN: Primary | ICD-10-CM

## 2019-03-17 LAB — HCG UR QL: NEGATIVE

## 2019-03-17 PROCEDURE — 81025 URINE PREGNANCY TEST: CPT

## 2019-03-17 PROCEDURE — 99283 EMERGENCY DEPT VISIT LOW MDM: CPT

## 2019-03-17 PROCEDURE — 73110 X-RAY EXAM OF WRIST: CPT

## 2019-03-17 RX ORDER — TOPIRAMATE 200 MG/1
100 TABLET ORAL 2 TIMES DAILY
COMMUNITY
End: 2020-10-02

## 2019-03-17 NOTE — DISCHARGE INSTRUCTIONS
We hope that we have addressed all of your medical concerns. The examination and treatment you received in the Emergency Department were for an emergent problem and were not intended as complete care. It is important that you follow up with your healthcare provider(s) for ongoing care. If your symptoms worsen or do not improve as expected, and you are unable to reach your usual health care provider(s), you should return to the Emergency Department. [facility] participate in nationally recognized quality of care measures. If your blood pressure is greater than 120/80, as reported below, we urge that you seek medical care to address the potential of high blood pressure, commonly known as hypertension. Hypertension can be hereditary or can be caused by certain medical conditions, pain, stress, or \"white coat syndrome. \"       Please make an appointment with your health care provider(s) for follow up of your Emergency Department visit. VITALS:   Patient Vitals for the past 8 hrs:   Temp Pulse Resp BP SpO2   03/17/19 1359 98.3 °F (36.8 °C) 66 18 (!) 129/93 100 %          Thank you for allowing us to provide you with medical care today. We realize that you have many choices for your emergency care needs. Please choose us in the future for any continued health care needs. Salvatore Wang [place], NP            Recent Results (from the past 24 hour(s))   HCG URINE, QL. - POC    Collection Time: 03/17/19  2:10 PM   Result Value Ref Range    Pregnancy test,urine (POC) NEGATIVE  NEG         Xr Wrist Rt Ap/lat/obl Min 3v    Result Date: 3/17/2019  EXAM: XR WRIST RT AP/LAT/OBL MIN 3V Clinical history: Wrist pain INDICATION: pain. COMPARISON: None. FINDINGS: Three  views of the right wrist demonstrate no fracture or other acute osseous or articular abnormality. The soft tissues are within normal limits. IMPRESSION: No acute abnormality.
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
## 2019-05-13 ENCOUNTER — OFFICE VISIT (OUTPATIENT)
Dept: INTERNAL MEDICINE CLINIC | Age: 39
End: 2019-05-13

## 2019-05-13 VITALS
RESPIRATION RATE: 16 BRPM | HEIGHT: 68 IN | DIASTOLIC BLOOD PRESSURE: 77 MMHG | OXYGEN SATURATION: 98 % | BODY MASS INDEX: 31.67 KG/M2 | SYSTOLIC BLOOD PRESSURE: 124 MMHG | HEART RATE: 70 BPM | TEMPERATURE: 98 F | WEIGHT: 209 LBS

## 2019-05-13 DIAGNOSIS — R29.898 LEFT ARM WEAKNESS: ICD-10-CM

## 2019-05-13 DIAGNOSIS — R29.898 DEFICIENCIES OF LIMBS: Primary | ICD-10-CM

## 2019-05-13 DIAGNOSIS — R73.01 IFG (IMPAIRED FASTING GLUCOSE): ICD-10-CM

## 2019-05-13 DIAGNOSIS — R00.2 PALPITATION: ICD-10-CM

## 2019-05-13 DIAGNOSIS — F32.9 REACTIVE DEPRESSION: ICD-10-CM

## 2019-05-13 DIAGNOSIS — F41.9 ANXIETY: Primary | ICD-10-CM

## 2019-05-13 DIAGNOSIS — E55.9 VITAMIN D DEFICIENCY: ICD-10-CM

## 2019-05-13 DIAGNOSIS — G44.209 ACUTE NON INTRACTABLE TENSION-TYPE HEADACHE: ICD-10-CM

## 2019-05-13 DIAGNOSIS — E66.9 OBESITY (BMI 35.0-39.9 WITHOUT COMORBIDITY): ICD-10-CM

## 2019-05-13 DIAGNOSIS — R07.89 CHEST PAIN, ATYPICAL: ICD-10-CM

## 2019-05-13 DIAGNOSIS — M79.7 FIBROMYALGIA: ICD-10-CM

## 2019-05-13 RX ORDER — BUPROPION HYDROCHLORIDE 300 MG/1
300 TABLET ORAL
Qty: 90 TAB | Refills: 1 | Status: SHIPPED | OUTPATIENT
Start: 2019-05-13 | End: 2020-10-02

## 2019-05-13 RX ORDER — METFORMIN HYDROCHLORIDE 500 MG/1
500 TABLET, FILM COATED, EXTENDED RELEASE ORAL DAILY
COMMUNITY

## 2019-05-13 RX ORDER — MELOXICAM 7.5 MG/1
7.5 TABLET ORAL DAILY
COMMUNITY
End: 2019-06-13

## 2019-05-13 RX ORDER — LORAZEPAM 1 MG/1
1 TABLET ORAL 3 TIMES DAILY
COMMUNITY
End: 2020-11-02

## 2019-05-13 RX ORDER — ACETAMINOPHEN 500 MG
TABLET ORAL
COMMUNITY

## 2019-05-13 NOTE — PROGRESS NOTES
HISTORY OF PRESENT ILLNESS Viviana Palomares is a 37 y.o. female. HPI Pt is here to establish care. BP is 124/77 Wt today is 209 lbs On wellbutrin and topamax to assist 
Discussed diet and w/l Will get labs today Pt follows with Petrona Eduardo, a therapist 
She is on wellbutrin 150mg, and also has ativan 1mg - ativan is written as TID but pt states that she is currently taking once daily There was another medication that her PCP tried to prescribe but it was not covered Pt also has a doctor on demand who prescribes medications Pt states that she has been trying to scale back ativan but that it is Pt lost both her mother and stepfather recently She gets CP/palpitations/SOB with anxiety Discussed can provide ativan up to once per day Will increase wellbutrin to 300mg Reports having tried medications such as zoloft, lexapro, effexor and having bad SE 
Provided psych referral 
 
Pt reports having fibromyalgia Pt does not follow with a rheum, states she spoke to Dr Barabara Gosselin office and was told she needs a referral 
She reports stress being a big trigger for this Pt has tried gabapentin, lyrica, cymbalta, but had SE from all of these She has also tried acupuncture She also sees a chiropractor--these help but has lots of pain in general  
Provided referral for rheum Pt is on metformin 500mg once daily for PCOS Pt is on topamax 200mg once daily for HAs and w/l This was increased from 100mg x 3 weeks However she is still having HAs though somewhat improved She has not had brain imaging in the past 
Ordered brain MRI Pt c/o increased weakness in L arm x 2.5 weeks - worsened Has nl strength on exam  
Has pain in neck to left arm making it feel weak She is concerned about this d/t fmhx of stroke She had difficulty picking up her purse last week d/t pain Pt has pain going from neck into arm Ordered XR C spine PMHx: Fibromyalgia Depression and anxiety GERD 
 
 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 
Colonoscopy: not yet needed Pap: Dr. Fiorella Cotter, ~, f/u scheduled for this week Mammogram: 10/18 Dexa: not yet needed Tdap: ~, declines updating today Pneumovax: not yet needed Shingrix: not yet needed Flu shot: declines Eye exam: 3 years ago, due Lipids: ordered [id number] There are no active problems to display for this patient. Current Outpatient Medications Medication Sig Dispense Refill  topiramate (TOPAMAX) 200 mg tablet Take 100 mg by mouth two (2) times a day.  buPROPion XL (WELLBUTRIN XL) 150 mg tablet bupropion HCl  mg 24 hr tablet, extended release  OTHER,NON-FORMULARY, Black seed oil 1X/day  glucosam/chond/hyalu/CF borate (MOVE FREE JOINT HEALTH PO) Take  by mouth daily.  L.acid/L.casei/B.bif/B.donnie/FOS (PROBIOTIC BLEND PO) Take  by mouth. From [facility] Past Surgical History:  
Procedure Laterality Date  HX GYN    
 fallopian tube removed after tubal pregnancy  HX GYN    
 hysteroscopy/laporoscopy to remove schar tissue  HX OTHER SURGICAL    
 oral surgery for wisdom teeth, root canal  
  
Lab Results Component Value Date/Time WBC 5.9 2018 06:58 PM  
 HGB 11.0 (L) 2018 06:58 PM  
 Hemoglobin, POC 11.9 (L) 2010 05:35 PM  
 HCT 34.0 (L) 2018 06:58 PM  
 Hematocrit, POC 35 (L) 2010 05:35 PM  
 PLATELET 494 (H)  06:58 PM  
 MCV 85.6 2018 06:58 PM  
 
No results found for: CHOL, CHOLPOCT, HDL, LDL, LDLC, LDLCPOC, LDLCEXT, TRIGL, TGLPOCT, CHHD, CHHDX Lab Results Component Value Date/Time GFR est non-AA >60 06/27/2018 06:58 PM  
 GFR est AA >60 06/27/2018 06:58 PM  
 Creatinine 0.88 06/27/2018 06:58 PM  
 BUN 8 06/27/2018 06:58 PM  
 BUN, POC 6 (L) 06/01/2010 05:35 PM  
 Sodium 136 06/27/2018 06:58 PM  
 Sodium,  06/01/2010 05:35 PM  
 Potassium 3.8 06/27/2018 06:58 PM  
 Potassium, POC 3.2 (L) 06/01/2010 05:35 PM  
 Chloride 101 06/27/2018 06:58 PM  
 Chloride,  06/01/2010 05:35 PM  
 CO2 30 06/27/2018 06:58 PM  
  
Review of Systems Constitutional: Negative for chills and fever. HENT: Negative for hearing loss and tinnitus. Eyes: Negative for blurred vision and double vision. Respiratory: Positive for shortness of breath. Negative for wheezing. Cardiovascular: Positive for palpitations. Negative for chest pain. Gastrointestinal: Negative for nausea and vomiting. Genitourinary: Negative for dysuria and frequency. Musculoskeletal: Positive for myalgias. Negative for back pain and falls. Skin: Negative for itching and rash. Neurological: Positive for dizziness and headaches. Negative for loss of consciousness. Psychiatric/Behavioral: Positive for depression. The patient is nervous/anxious. Physical Exam  
Constitutional: She is oriented to person, place, and time. She appears well-developed and well-nourished. No distress. HENT:  
Head: Normocephalic and atraumatic. Right Ear: External ear normal.  
Left Ear: External ear normal.  
Mouth/Throat: Oropharynx is clear and moist. No oropharyngeal exudate. Eyes: Pupils are equal, round, and reactive to light. Conjunctivae and EOM are normal. Right eye exhibits no discharge. Left eye exhibits no discharge. No scleral icterus. Neck: Normal range of motion. Neck supple. No carotid bruits Cardiovascular: Normal rate, regular rhythm, normal heart sounds and intact distal pulses. Exam reveals no gallop and no friction rub. No murmur heard. Pulmonary/Chest: Effort normal and breath sounds normal. No respiratory distress. She has no wheezes. She has no rales. She exhibits no tenderness. Abdominal: Soft. She exhibits no distension and no mass. There is no tenderness. There is no rebound and no guarding. Musculoskeletal: Normal range of motion. She exhibits no edema, tenderness or deformity. 5/5 strength BLUE Lymphadenopathy:  
  She has no cervical adenopathy. Neurological: She is alert and oriented to person, place, and time. Coordination normal.  
Cranial nerves 2-12 grossly intact Finger to nose normal BL Rapid alternating hand movements normal BL Skin: Skin is warm and dry. No rash noted. She is not diaphoretic. No erythema. No pallor. Psychiatric: She has a normal mood and affect. Her behavior is normal.  
 
 
ASSESSMENT and PLAN 
  ICD-10-CM ICD-9-CM 1. Anxiety Pt with significant anxiety, currently on wellbutrin and ativan, she has tried and failed multiple meds to include, lexapro, effexor, zoloft, and cymbalta, will increase wellbutrin to 300mg to help with her depression, she is currently on ativan prescribed 1mg TID, discussed addictive nature of this med and that I would not prescribe more than qhs prn, she states she is only taking it once per day anyway and is seeing a therapist, will continue current dosing of ativan, will refer her to psych as well, continue with therapist 
 F41.9 300.00 LIPID PANEL  
   METABOLIC PANEL, COMPREHENSIVE  
   CBC W/O DIFF  
   TSH 3RD GENERATION  
   HEMOGLOBIN A1C WITH EAG  
   VITAMIN D, 25 HYDROXY 2. Reactive depression See above F32.9 300.4 LIPID PANEL  
   METABOLIC PANEL, COMPREHENSIVE  
   CBC W/O DIFF  
   TSH 3RD GENERATION  
   HEMOGLOBIN A1C WITH EAG  
   VITAMIN D, 25 HYDROXY 3. Acute non intractable tension-type headache Pt with chronic HAs, currently on topamax 200mg qhs, she still has significant HAs, has not had brain imaging in the past, will get MRI for further evaluation G44.209 339.10 LIPID PANEL  
   METABOLIC PANEL, COMPREHENSIVE  
   CBC W/O DIFF  
   TSH 3RD GENERATION  
   HEMOGLOBIN A1C WITH EAG  
   VITAMIN D, 25 HYDROXY  
   MRI BRAIN WO CONT 4. Fibromyalgia Pt reports trying and failing, lyrica, gabapentin, and cymbalta, acupuncture and chiropractic care seem to help the most, she will continue with this, and will set her up with rheum 
 M79.7 729.1 REFERRAL TO RHEUMATOLOGY  
   LIPID PANEL  
   METABOLIC PANEL, COMPREHENSIVE  
   CBC W/O DIFF  
   TSH 3RD GENERATION  
   HEMOGLOBIN A1C WITH EAG  
   VITAMIN D, 25 HYDROXY 5. Obesity (BMI 35.0-39.9 without comorbidity) 
 
topamax and wellbutrin both assist with w/l which is part of why she is taking these meds, work on portion control as well 
 E66.9 278.00 LIPID PANEL  
   METABOLIC PANEL, COMPREHENSIVE  
   CBC W/O DIFF  
   TSH 3RD GENERATION  
   HEMOGLOBIN A1C WITH EAG  
   VITAMIN D, 25 HYDROXY 6. Chest pain, atypical 
 
Pt reports CP and palpitations primarily associated with anxiety, EKG today was nsr, pain is not exertional, just stress provoked, will not et stress test just yet, will get event monitor and ECHO 
 R07.89 786.59 AMB POC EKG ROUTINE W/ 12 LEADS, INTER & REP  
   LIPID PANEL  
   METABOLIC PANEL, COMPREHENSIVE  
   CBC W/O DIFF  
   TSH 3RD GENERATION  
   HEMOGLOBIN A1C WITH EAG  
   VITAMIN D, 25 HYDROXY 7. Left arm weakness Has completely nl strength on exam, 5/5 BLE, likely from nerve impingement, will get C spine plain film R29.898 729.89 XR SPINE CERV 4 OR 5 V  
   LIPID PANEL  
   METABOLIC PANEL, COMPREHENSIVE  
   CBC W/O DIFF  
   TSH 3RD GENERATION  
   HEMOGLOBIN A1C WITH EAG  
   VITAMIN D, 25 HYDROXY 8. Palpitation See above 
 R00.2 785.1 ECHO ADULT COMPLETE  
   LIPID PANEL  
   METABOLIC PANEL, COMPREHENSIVE  
   CBC W/O DIFF  
   TSH 3RD GENERATION  
   HEMOGLOBIN A1C WITH EAG  
   VITAMIN D, 25 HYDROXY 9. IFG (impaired fasting glucose) Check a1c, on metformin for PCOS 
 R73.01 790.21 LIPID PANEL  
   METABOLIC PANEL, COMPREHENSIVE  
   CBC W/O DIFF  
   TSH 3RD GENERATION  
   HEMOGLOBIN A1C WITH EAG  
   VITAMIN D, 25 HYDROXY 10. Vitamin D deficiency Check level and treat prn 
 E55.9 268.9 LIPID PANEL  
   METABOLIC PANEL, COMPREHENSIVE  
   CBC W/O DIFF  
   TSH 3RD GENERATION  
   HEMOGLOBIN A1C WITH EAG  
   VITAMIN D, 25 HYDROXY Depression screen reviewed and negative. Scribed by Griselda Gay of [address] [place] [address] [address], as dictated by Dr. Edi Lombardi. Current diagnosis and concerns discussed with pt at length. Pt understands risks and benefits or current treatment plan and medications, and accepts the treatment and medication with any possible risks. Pt asks appropriate questions, which were answered. Pt was instructed to call with any concerns or problems. I have reviewed the note documented by the scribe. The services provided are my own. The documentation is accurate

## 2019-05-14 LAB
25(OH)D3+25(OH)D2 SERPL-MCNC: 18.6 NG/ML (ref 30–100)
ALBUMIN SERPL-MCNC: 4.1 G/DL (ref 3.5–5.5)
ALBUMIN/GLOB SERPL: 1.6 {RATIO} (ref 1.2–2.2)
ALP SERPL-CCNC: 85 IU/L (ref 39–117)
ALT SERPL-CCNC: 15 IU/L (ref 0–32)
AST SERPL-CCNC: 22 IU/L (ref 0–40)
BILIRUB SERPL-MCNC: <0.2 MG/DL (ref 0–1.2)
BUN SERPL-MCNC: 9 MG/DL (ref 6–24)
BUN/CREAT SERPL: 9 (ref 9–23)
CALCIUM SERPL-MCNC: 9 MG/DL (ref 8.7–10.2)
CHLORIDE SERPL-SCNC: 103 MMOL/L (ref 96–106)
CHOLEST SERPL-MCNC: 224 MG/DL (ref 100–199)
CO2 SERPL-SCNC: 21 MMOL/L (ref 20–29)
CREAT SERPL-MCNC: 1.02 MG/DL (ref 0.57–1)
ERYTHROCYTE [DISTWIDTH] IN BLOOD BY AUTOMATED COUNT: 15.1 % (ref 12.3–15.4)
EST. AVERAGE GLUCOSE BLD GHB EST-MCNC: 111 MG/DL
GLOBULIN SER CALC-MCNC: 2.5 G/DL (ref 1.5–4.5)
GLUCOSE SERPL-MCNC: 88 MG/DL (ref 65–99)
HBA1C MFR BLD: 5.5 % (ref 4.8–5.6)
HCT VFR BLD AUTO: 34 % (ref 34–46.6)
HDLC SERPL-MCNC: 53 MG/DL
HGB BLD-MCNC: 11.1 G/DL (ref 11.1–15.9)
LDLC SERPL CALC-MCNC: 148 MG/DL (ref 0–99)
MCH RBC QN AUTO: 26.4 PG (ref 26.6–33)
MCHC RBC AUTO-ENTMCNC: 32.6 G/DL (ref 31.5–35.7)
MCV RBC AUTO: 81 FL (ref 79–97)
PLATELET # BLD AUTO: 495 X10E3/UL (ref 150–379)
POTASSIUM SERPL-SCNC: 4.6 MMOL/L (ref 3.5–5.2)
PROT SERPL-MCNC: 6.6 G/DL (ref 6–8.5)
RBC # BLD AUTO: 4.21 X10E6/UL (ref 3.77–5.28)
SODIUM SERPL-SCNC: 137 MMOL/L (ref 134–144)
TRIGL SERPL-MCNC: 114 MG/DL (ref 0–149)
TSH SERPL DL<=0.005 MIU/L-ACNC: 1.36 UIU/ML (ref 0.45–4.5)
VLDLC SERPL CALC-MCNC: 23 MG/DL (ref 5–40)
WBC # BLD AUTO: 4.1 X10E3/UL (ref 3.4–10.8)

## 2019-05-14 NOTE — PROGRESS NOTES
vit D is low--the patient needs 50,000 units weekly for 16 weeks then start a daily 1000unit supplement instead.  
 
Lipids up , work on wt loss

## 2019-05-16 ENCOUNTER — DOCUMENTATION ONLY (OUTPATIENT)
Dept: INTERNAL MEDICINE CLINIC | Age: 39
End: 2019-05-16

## 2019-05-16 RX ORDER — ERGOCALCIFEROL 1.25 MG/1
50000 CAPSULE ORAL
Qty: 16 CAP | Refills: 0 | Status: SHIPPED | OUTPATIENT
Start: 2019-05-16 | End: 2019-08-23 | Stop reason: SDUPTHER

## 2019-05-16 NOTE — PROGRESS NOTES
Called, spoke to pt. Two pt identifiers confirmed. Pt informed per Dr. Rose Rajan vit D is low--the patient needs 50,000 units weekly for 16 weeks then start a daily 1000unit supplement instead. Pended. Pt informed per Dr. Rose Rajan lipids up; work on wt loss. Pt verbalized understanding of information discussed w/ no further questions at this time.

## 2019-05-16 NOTE — TELEPHONE ENCOUNTER
Message not needed [facility] 5-16-19      #[phone] pt states she is at the PT office
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
## 2019-05-22 ENCOUNTER — HOSPITAL ENCOUNTER (OUTPATIENT)
Dept: NON INVASIVE DIAGNOSTICS | Age: 39
Discharge: HOME | End: 2019-05-22
Attending: INTERNAL MEDICINE

## 2019-05-22 DIAGNOSIS — R00.2 PALPITATION: ICD-10-CM

## 2019-05-22 NOTE — PROGRESS NOTES
Abelardo Khan to [facility] instead of coming to [facility]. I informed if they could not do exam there she would have to reschedule.

## 2019-06-03 ENCOUNTER — HOSPITAL ENCOUNTER (OUTPATIENT)
Dept: MRI IMAGING | Age: 39
Discharge: HOME | End: 2019-06-03
Attending: INTERNAL MEDICINE
Payer: COMMERCIAL

## 2019-06-03 DIAGNOSIS — G44.209 ACUTE NON INTRACTABLE TENSION-TYPE HEADACHE: ICD-10-CM

## 2019-06-03 PROCEDURE — 70551 MRI BRAIN STEM W/O DYE: CPT

## 2019-06-12 ENCOUNTER — HOSPITAL ENCOUNTER (OUTPATIENT)
Dept: NON INVASIVE DIAGNOSTICS | Age: 39
Discharge: HOME | End: 2019-06-12
Attending: INTERNAL MEDICINE
Payer: COMMERCIAL

## 2019-06-12 VITALS
HEIGHT: 67 IN | DIASTOLIC BLOOD PRESSURE: 74 MMHG | WEIGHT: 209 LBS | SYSTOLIC BLOOD PRESSURE: 124 MMHG | BODY MASS INDEX: 32.8 KG/M2

## 2019-06-12 DIAGNOSIS — R00.2 PALPITATIONS: ICD-10-CM

## 2019-06-12 LAB
ECHO AV CUSP MM: 2.21 CM
ECHO AV PEAK GRADIENT: 6.5 MMHG
ECHO AV PEAK VELOCITY: 127.39 CM/S
ECHO EST RA PRESSURE: 10 MMHG
ECHO LA VOL 4C: 33.75 ML (ref 22–52)
ECHO LA VOLUME INDEX A4C: 16.38 ML/M2 (ref 16–28)
ECHO LV INTERNAL DIMENSION DIASTOLIC MMODE: 4.37 CM
ECHO LV INTERNAL DIMENSION SYSTOLIC MMODE: 2.5 CM
ECHO LV IVSD MMODE: 0.91 CM
ECHO LV POSTERIOR WALL DIASTOLIC MMODE: 1.02 CM
ECHO LV POSTERIOR WALL SYSTOLIC MMODE: 0 CM
ECHO LVOT PEAK GRADIENT: 2.9 MMHG
ECHO LVOT PEAK VELOCITY: 84.48 CM/S
ECHO MV A VELOCITY: 53.71 CM/S
ECHO MV AREA PHT: 4 CM2
ECHO MV E DECELERATION TIME (DT): 187.3 MS
ECHO MV E VELOCITY: 69.55 CM/S
ECHO MV E/A RATIO: 1.29
ECHO MV PRESSURE HALF TIME (PHT): 54.3 MS
ECHO MV REGURGITANT PEAK GRADIENT: 5.7 MMHG
ECHO MV REGURGITANT PEAK VELOCITY: 119.5 CM/S
ECHO PULMONARY ARTERY SYSTOLIC PRESSURE (PASP): 26.8 MMHG
ECHO PV MAX VELOCITY: 61.6 CM/S
ECHO PV PEAK GRADIENT: 1.5 MMHG
ECHO RIGHT VENTRICULAR SYSTOLIC PRESSURE (RVSP): 26.8 MMHG
ECHO RV INTERNAL DIMENSION: 2.78 CM
ECHO TV MAX VELOCITY: 66.03 CM/S
ECHO TV PEAK GRADIENT: 1.7 MMHG
ECHO TV REGURGITANT MAX VELOCITY: 204.67 CM/S
ECHO TV REGURGITANT PEAK GRADIENT: 16.8 MMHG

## 2019-06-12 PROCEDURE — 93306 TTE W/DOPPLER COMPLETE: CPT

## 2019-06-13 ENCOUNTER — OFFICE VISIT (OUTPATIENT)
Dept: INTERNAL MEDICINE CLINIC | Age: 39
End: 2019-06-13

## 2019-06-13 VITALS
BODY MASS INDEX: 31.39 KG/M2 | RESPIRATION RATE: 18 BRPM | TEMPERATURE: 97.7 F | HEART RATE: 83 BPM | SYSTOLIC BLOOD PRESSURE: 132 MMHG | OXYGEN SATURATION: 100 % | WEIGHT: 200 LBS | HEIGHT: 67 IN | DIASTOLIC BLOOD PRESSURE: 85 MMHG

## 2019-06-13 DIAGNOSIS — J40 BRONCHITIS: ICD-10-CM

## 2019-06-13 DIAGNOSIS — G43.709 CHRONIC MIGRAINE WITHOUT AURA WITHOUT STATUS MIGRAINOSUS, NOT INTRACTABLE: ICD-10-CM

## 2019-06-13 DIAGNOSIS — R00.2 PALPITATION: ICD-10-CM

## 2019-06-13 DIAGNOSIS — M79.7 FIBROMYALGIA: ICD-10-CM

## 2019-06-13 DIAGNOSIS — E55.9 VITAMIN D DEFICIENCY: ICD-10-CM

## 2019-06-13 DIAGNOSIS — E66.9 OBESITY (BMI 30.0-34.9): ICD-10-CM

## 2019-06-13 DIAGNOSIS — F41.9 ANXIETY: Primary | ICD-10-CM

## 2019-06-13 DIAGNOSIS — R20.2 PARESTHESIA OF BOTH HANDS: ICD-10-CM

## 2019-06-13 DIAGNOSIS — E78.2 MIXED HYPERLIPIDEMIA: ICD-10-CM

## 2019-06-13 RX ORDER — ALBUTEROL SULFATE 90 UG/1
AEROSOL, METERED RESPIRATORY (INHALATION)
Qty: 1 INHALER | Refills: 0 | Status: SHIPPED | OUTPATIENT
Start: 2019-06-13 | End: 2022-01-27 | Stop reason: ALTCHOICE

## 2019-06-13 RX ORDER — BUPROPION HYDROCHLORIDE 150 MG/1
300 TABLET ORAL
Qty: 60 TAB | Refills: 3 | Status: SHIPPED | OUTPATIENT
Start: 2019-06-13 | End: 2019-10-17 | Stop reason: SDUPTHER

## 2019-06-13 RX ORDER — MEDROXYPROGESTERONE ACETATE 10 MG/1
TABLET ORAL
Refills: 0 | COMMUNITY
Start: 2019-04-15 | End: 2020-11-02

## 2019-06-13 RX ORDER — ALBUTEROL SULFATE 90 UG/1
AEROSOL, METERED RESPIRATORY (INHALATION)
COMMUNITY
End: 2019-06-13 | Stop reason: SDUPTHER
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
## 2019-07-01 ENCOUNTER — DOCUMENTATION ONLY (OUTPATIENT)
Dept: INTERNAL MEDICINE CLINIC | Age: 39
End: 2019-07-01

## 2019-07-01 ENCOUNTER — OFFICE VISIT (OUTPATIENT)
Dept: INTERNAL MEDICINE CLINIC | Age: 39
End: 2019-07-01

## 2019-07-01 VITALS
RESPIRATION RATE: 16 BRPM | BODY MASS INDEX: 31.08 KG/M2 | OXYGEN SATURATION: 98 % | HEART RATE: 82 BPM | TEMPERATURE: 98 F | WEIGHT: 198 LBS | DIASTOLIC BLOOD PRESSURE: 70 MMHG | SYSTOLIC BLOOD PRESSURE: 110 MMHG | HEIGHT: 67 IN

## 2019-07-01 DIAGNOSIS — D75.839 THROMBOCYTOSIS: ICD-10-CM

## 2019-07-01 DIAGNOSIS — E55.9 VITAMIN D DEFICIENCY: ICD-10-CM

## 2019-07-01 DIAGNOSIS — E78.2 MIXED HYPERLIPIDEMIA: ICD-10-CM

## 2019-07-01 DIAGNOSIS — Z01.810 PREOP CARDIOVASCULAR EXAM: Primary | ICD-10-CM

## 2019-07-01 NOTE — LETTER
Carmel Hall is currently under the care of [facility] with Dr. Sabas Drummond. Pt is here for a pre-op clearance. The pt is medically optimized and cleared for surgery. Any questions please call [phone]. Thank you

## 2019-07-01 NOTE — PROGRESS NOTES
Pre-op letter clearing pt for surgery was faxed to Dr. Levin Erps office. [phone]. Confirmation received.

## 2019-08-23 RX ORDER — ERGOCALCIFEROL 1.25 MG/1
CAPSULE ORAL
Qty: 16 CAP | Refills: 0 | Status: SHIPPED | OUTPATIENT
Start: 2019-08-23 | End: 2020-06-01

## 2019-09-30 ENCOUNTER — HOSPITAL ENCOUNTER (OUTPATIENT)
Dept: CT IMAGING | Age: 39
Discharge: HOME | End: 2019-09-30
Attending: OBSTETRICS & GYNECOLOGY

## 2019-09-30 DIAGNOSIS — E28.2 PCOS (POLYCYSTIC OVARIAN SYNDROME): ICD-10-CM

## 2019-09-30 DIAGNOSIS — R10.2 PELVIC PAIN IN FEMALE: ICD-10-CM

## 2019-09-30 RX ORDER — SODIUM CHLORIDE 0.9 % (FLUSH) 0.9 %
10 SYRINGE (ML) INJECTION
Status: DISCONTINUED | OUTPATIENT
Start: 2019-09-30 | End: 2019-09-30

## 2019-10-07 ENCOUNTER — HOSPITAL ENCOUNTER (OUTPATIENT)
Dept: CT IMAGING | Age: 39
Discharge: HOME | End: 2019-10-07
Attending: OBSTETRICS & GYNECOLOGY
Payer: COMMERCIAL

## 2019-10-07 PROCEDURE — 74011636320 HC RX REV CODE- 636/320: Performed by: OBSTETRICS & GYNECOLOGY

## 2019-10-07 PROCEDURE — 74177 CT ABD & PELVIS W/CONTRAST: CPT

## 2019-10-07 PROCEDURE — 74011000255 HC RX REV CODE- 255: Performed by: OBSTETRICS & GYNECOLOGY

## 2019-10-07 RX ORDER — BARIUM SULFATE 20 MG/ML
900 SUSPENSION ORAL
Status: COMPLETED | OUTPATIENT
Start: 2019-10-07 | End: 2019-10-07

## 2019-10-07 RX ORDER — SODIUM CHLORIDE 0.9 % (FLUSH) 0.9 %
10 SYRINGE (ML) INJECTION
Status: COMPLETED | OUTPATIENT
Start: 2019-10-07 | End: 2019-10-07

## 2019-10-07 RX ADMIN — BARIUM SULFATE 900 ML: 21 SUSPENSION ORAL at 11:09

## 2019-10-07 RX ADMIN — IOPAMIDOL 100 ML: 755 INJECTION, SOLUTION INTRAVENOUS at 11:08

## 2019-10-07 RX ADMIN — Medication 10 ML: at 11:09

## 2019-10-17 RX ORDER — BUPROPION HYDROCHLORIDE 150 MG/1
TABLET ORAL
Qty: 60 TAB | Refills: 0 | Status: SHIPPED | OUTPATIENT
Start: 2019-10-17 | End: 2019-11-17 | Stop reason: SDUPTHER

## 2019-11-17 RX ORDER — BUPROPION HYDROCHLORIDE 150 MG/1
TABLET ORAL
Qty: 60 TAB | Refills: 0 | Status: SHIPPED | OUTPATIENT
Start: 2019-11-17 | End: 2020-02-18

## 2019-12-16 ENCOUNTER — OFFICE VISIT (OUTPATIENT)
Dept: INTERNAL MEDICINE CLINIC | Age: 39
End: 2019-12-16

## 2019-12-16 VITALS
SYSTOLIC BLOOD PRESSURE: 114 MMHG | RESPIRATION RATE: 16 BRPM | HEIGHT: 67 IN | OXYGEN SATURATION: 99 % | HEART RATE: 73 BPM | BODY MASS INDEX: 31.01 KG/M2 | DIASTOLIC BLOOD PRESSURE: 81 MMHG | TEMPERATURE: 98.7 F

## 2019-12-16 DIAGNOSIS — E78.2 MIXED HYPERLIPIDEMIA: ICD-10-CM

## 2019-12-16 DIAGNOSIS — D75.839 THROMBOCYTOSIS: ICD-10-CM

## 2019-12-16 DIAGNOSIS — Z00.00 PHYSICAL EXAM: Primary | ICD-10-CM

## 2019-12-16 DIAGNOSIS — E55.9 VITAMIN D DEFICIENCY: ICD-10-CM

## 2019-12-16 DIAGNOSIS — E66.9 OBESITY (BMI 30.0-34.9): ICD-10-CM

## 2019-12-16 PROBLEM — E28.2 PCOS (POLYCYSTIC OVARIAN SYNDROME): Status: ACTIVE | Noted: 2019-12-16

## 2019-12-16 PROBLEM — G44.229 CHRONIC TENSION-TYPE HEADACHE, NOT INTRACTABLE: Status: ACTIVE | Noted: 2019-12-16

## 2019-12-16 PROBLEM — F32.A ANXIETY AND DEPRESSION: Status: ACTIVE | Noted: 2019-12-16

## 2019-12-16 PROBLEM — F41.9 ANXIETY AND DEPRESSION: Status: ACTIVE | Noted: 2019-12-16

## 2019-12-16 PROBLEM — M79.7 FIBROMYALGIA: Status: ACTIVE | Noted: 2019-12-16
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
## 2019-12-17 LAB
25(OH)D3+25(OH)D2 SERPL-MCNC: 42.8 NG/ML (ref 30–100)
ALBUMIN SERPL-MCNC: 4.5 G/DL (ref 3.5–5.5)
ALBUMIN/GLOB SERPL: 1.8 {RATIO} (ref 1.2–2.2)
ALP SERPL-CCNC: 78 IU/L (ref 39–117)
ALT SERPL-CCNC: 15 IU/L (ref 0–32)
AST SERPL-CCNC: 18 IU/L (ref 0–40)
BILIRUB SERPL-MCNC: 0.4 MG/DL (ref 0–1.2)
BUN SERPL-MCNC: 10 MG/DL (ref 6–24)
BUN/CREAT SERPL: 10 (ref 9–23)
CALCIUM SERPL-MCNC: 9.9 MG/DL (ref 8.7–10.2)
CHLORIDE SERPL-SCNC: 102 MMOL/L (ref 96–106)
CHOLEST SERPL-MCNC: 230 MG/DL (ref 100–199)
CO2 SERPL-SCNC: 23 MMOL/L (ref 20–29)
CREAT SERPL-MCNC: 1.01 MG/DL (ref 0.57–1)
ERYTHROCYTE [DISTWIDTH] IN BLOOD BY AUTOMATED COUNT: 14.3 % (ref 12.3–15.4)
EST. AVERAGE GLUCOSE BLD GHB EST-MCNC: 108 MG/DL
GLOBULIN SER CALC-MCNC: 2.5 G/DL (ref 1.5–4.5)
GLUCOSE SERPL-MCNC: 89 MG/DL (ref 65–99)
HBA1C MFR BLD: 5.4 % (ref 4.8–5.6)
HCT VFR BLD AUTO: 35.8 % (ref 34–46.6)
HDLC SERPL-MCNC: 53 MG/DL
HGB BLD-MCNC: 11.7 G/DL (ref 11.1–15.9)
LDLC SERPL CALC-MCNC: 160 MG/DL (ref 0–99)
MCH RBC QN AUTO: 26.7 PG (ref 26.6–33)
MCHC RBC AUTO-ENTMCNC: 32.7 G/DL (ref 31.5–35.7)
MCV RBC AUTO: 82 FL (ref 79–97)
PLATELET # BLD AUTO: 580 X10E3/UL (ref 150–450)
POTASSIUM SERPL-SCNC: 4.1 MMOL/L (ref 3.5–5.2)
PROT SERPL-MCNC: 7 G/DL (ref 6–8.5)
RBC # BLD AUTO: 4.38 X10E6/UL (ref 3.77–5.28)
SODIUM SERPL-SCNC: 139 MMOL/L (ref 134–144)
TRIGL SERPL-MCNC: 87 MG/DL (ref 0–149)
TSH SERPL DL<=0.005 MIU/L-ACNC: 0.98 UIU/ML (ref 0.45–4.5)
VLDLC SERPL CALC-MCNC: 17 MG/DL (ref 5–40)
WBC # BLD AUTO: 3.8 X10E3/UL (ref 3.4–10.8)

## 2020-01-08 ENCOUNTER — TELEPHONE (OUTPATIENT)
Dept: INTERNAL MEDICINE CLINIC | Age: 40
End: 2020-01-08

## 2020-01-08 NOTE — TELEPHONE ENCOUNTER
Reason for call:  Update on fax   Callback required yes/no and why:  Yes   Best contact number(s) [phone]   Details to clarify the request:  Pt requesting to have her doctor confirm if she has received her fax regarding her ADA accommodation.    Chyna Ye

## 2020-01-09 NOTE — TELEPHONE ENCOUNTER
Called, spoke to pt. Two pt identifiers confirmed. Pt informed per Dr. Sen Prudent that she did Not receive the fax nor CCT ET. Pt advised to re-fax the form. Pt given LPN RR fax number to [employer]; make attn Clara Vincent. Pt states that she will fax it once she gets home. Pt verbalized understanding of information discussed w/ no further questions at this time. Awaiting form from pt via fax.

## 2020-01-09 NOTE — TELEPHONE ENCOUNTER
Called, spoke to pt. Two pt identifiers confirmed. Pt inquiring on an accommodation form (from [place]). Pt states that she faxed the form 1/8/20 around 1400. Informed pt LPN ERIN will consult w/ Dr. Soraida Sanchez to see is she has received it. Pt states that a detailed vm can be left on phone. Pt verbalized understanding of information discussed w/ no further questions at this time.

## 2020-01-13 NOTE — TELEPHONE ENCOUNTER
Called, spoke to pt. Two pt identifiers confirmed. Pt states that she forgot to fax over the accommodation forms from her Work. Pt states she is seeking an accommodation for a closer parking spot d/t fibromyalgia flare ups which cause the pt trouble walking. Forms received and placed in Dr. Meche Cheng office. Informed pt LPN RR will report back to pt. Pt verbalized understanding of information discussed w/ no further questions at this time.

## 2020-01-16 NOTE — TELEPHONE ENCOUNTER
Pt states the ADA document needed to be faxed by 1-17-20 and she needs to know if this has been done?

## 2020-01-17 ENCOUNTER — TELEPHONE (OUTPATIENT)
Dept: INTERNAL MEDICINE CLINIC | Age: 40
End: 2020-01-17

## 2020-01-17 NOTE — TELEPHONE ENCOUNTER
[facility]             General Message/Vendor Calls     Caller's first and last name:       Reason for call:   Time sensitive question about a fax sent over. Callback required yes/no and why:   Yes, to discuss a question she has. Best contact number(s):   [phone]     Details to clarify the request:   Pt was advised of the 24hr turnaround time but requested a call back today.      Anayeli Schilling     Copy/Paste  ENVERA

## 2020-01-17 NOTE — TELEPHONE ENCOUNTER
Reasonable Accommodation forms completed, signed, and faxed to [facility] at [phone] w/ confirmation received. Called, spoke to pt. Two pt identifiers confirmed. Informed pt Accommodation forms faxed w/ confirmation received. Pt asking if copy can be faxed to home fax ([phone]). Pt informed LPN RR will call pt if having trouble faxing to pt's personal fax. Pt verbalized understanding of information discussed w/ no further questions at this time.

## 2020-01-21 NOTE — TELEPHONE ENCOUNTER
Left vm for pt that forms re-faxed and awaiting response. Callback prn. Awaiting response from Melisa Bettencourt.

## 2020-01-21 NOTE — TELEPHONE ENCOUNTER
Spoke to Tautm Quintero at [facility] to inquire on Accommodation. Tatum Quintero informed that on question #2, Dr. Frandy Acevedo did not complete/check the answer. Tatum Quintero states that it can be answered again and re-faxed. Puneet Asher form faxed to [facility] at [phone].

## 2020-02-18 RX ORDER — BUPROPION HYDROCHLORIDE 150 MG/1
TABLET ORAL
Qty: 60 TAB | Refills: 0 | Status: SHIPPED | OUTPATIENT
Start: 2020-02-18 | End: 2020-03-18

## 2020-03-04 ENCOUNTER — TELEPHONE (OUTPATIENT)
Dept: INTERNAL MEDICINE CLINIC | Age: 40
End: 2020-03-04
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
## 2020-03-16 NOTE — TELEPHONE ENCOUNTER
Spoke with Guanako underwood/ [facility]) office. Informed Guanako Sanz that Dr. Teofilo Lloyd is unable to addend her note for Dr. Teofilo Lloyd was treating pt for Chronic HA's and there was no mention of them during that visit.

## 2020-03-18 RX ORDER — BUPROPION HYDROCHLORIDE 150 MG/1
TABLET ORAL
Qty: 60 TAB | Refills: 0 | Status: SHIPPED | OUTPATIENT
Start: 2020-03-18 | End: 2020-05-24

## 2020-04-06 ENCOUNTER — TELEPHONE (OUTPATIENT)
Dept: INTERNAL MEDICINE CLINIC | Age: 40
End: 2020-04-06

## 2020-04-06 NOTE — TELEPHONE ENCOUNTER
Patient called in to speak with Yadira about a bill. She said they last talked in February and she waiting for a return call. Thanks.
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
## 2020-05-11 ENCOUNTER — TELEPHONE (OUTPATIENT)
Dept: INTERNAL MEDICINE CLINIC | Age: 40
End: 2020-05-11

## 2020-05-11 NOTE — TELEPHONE ENCOUNTER
Late Entry:  Spoke to Keisha Sandoval at [facility] to inquire on retroactively resubmitting pt's 6/2019 MRI for coverage. Keisha Sandoval states that the retro date for pt's insurance is 2 days after the procedure date.

## 2020-05-11 NOTE — TELEPHONE ENCOUNTER
Spoke to Yevgeniy Montes De Oca at [facility] from [employer] to see if pt's 6/2019 MRI can be re-submitted for coverage. Yevgeniy Montes De Oca states that the claim # and procedure code is needed to go further into inquiry. Will contact pt.

## 2020-05-11 NOTE — TELEPHONE ENCOUNTER
Called, spoke to pt. Two pt identifiers confirmed. Pt states that she called to update Dr. Parker Schofield that her MRI ended up being covered by insurance-either her health and/or motor insurance covered the bill. Pt states that there was some disconnect from her insurance companies and  and that she had to do some research to find out about the bill. Pt wants to personally apologize w/ Dr. Parker Schofield from her last phone call. Pt informed Dr. Parker Schofield will be notified. Pt verbalized understanding of information discussed w/ no further questions at this time.

## 2020-05-11 NOTE — TELEPHONE ENCOUNTER
----- Message from April Olivia sent at 5/8/2020  4:33 PM EDT -----  Regarding: León Salazar MD  General Message/Vendor Calls    Caller's first and last name: Adele Villalpando [[id number]]      Reason for call:  Insurance Update      Callback required yes/no and why: Yes      Best contact number(s):[phone]      Message copied/pasted from [facility]

## 2020-05-24 RX ORDER — BUPROPION HYDROCHLORIDE 150 MG/1
TABLET ORAL
Qty: 60 TAB | Refills: 0 | Status: SHIPPED | OUTPATIENT
Start: 2020-05-24 | End: 2020-07-06

## 2020-06-01 RX ORDER — ERGOCALCIFEROL 1.25 MG/1
CAPSULE ORAL
Qty: 16 CAP | Refills: 0 | Status: SHIPPED | OUTPATIENT
Start: 2020-06-01 | End: 2020-10-01 | Stop reason: SDUPTHER

## 2020-07-06 RX ORDER — BUPROPION HYDROCHLORIDE 150 MG/1
TABLET ORAL
Qty: 60 TAB | Refills: 0 | Status: SHIPPED | OUTPATIENT
Start: 2020-07-06 | End: 2020-08-06

## 2020-08-06 RX ORDER — BUPROPION HYDROCHLORIDE 150 MG/1
TABLET ORAL
Qty: 60 TAB | Refills: 0 | Status: SHIPPED | OUTPATIENT
Start: 2020-08-06 | End: 2020-10-07 | Stop reason: SDUPTHER

## 2020-10-01 RX ORDER — ERGOCALCIFEROL 1.25 MG/1
CAPSULE ORAL
Qty: 16 CAP | Refills: 0 | Status: SHIPPED | OUTPATIENT
Start: 2020-10-01 | End: 2021-02-03

## 2020-10-01 NOTE — TELEPHONE ENCOUNTER
Future Appointments:  Future Appointments   Date Time Provider Bret De Anda   10/2/2020  3:00 PM Mariama Wise MD [facility] BS AMB        Last Appointment With Me:  Visit date not found     Requested Prescriptions     Pending Prescriptions Disp Refills    ergocalciferol (ERGOCALCIFEROL) 1,250 mcg (50,000 unit) capsule 16 Cap 0     Sig: TAKE 1 CAPSULE BY MOUTH EVERY 7 DAYS

## 2020-10-02 ENCOUNTER — VIRTUAL VISIT (OUTPATIENT)
Dept: INTERNAL MEDICINE CLINIC | Age: 40
End: 2020-10-02
Payer: COMMERCIAL

## 2020-10-02 DIAGNOSIS — E55.9 VITAMIN D DEFICIENCY: Primary | ICD-10-CM

## 2020-10-02 DIAGNOSIS — F32.A ANXIETY AND DEPRESSION: ICD-10-CM

## 2020-10-02 DIAGNOSIS — F41.9 ANXIETY AND DEPRESSION: ICD-10-CM

## 2020-10-02 DIAGNOSIS — D72.825 BANDEMIA: ICD-10-CM

## 2020-10-02 DIAGNOSIS — G44.229 CHRONIC TENSION-TYPE HEADACHE, NOT INTRACTABLE: ICD-10-CM

## 2020-10-02 DIAGNOSIS — E78.2 MIXED HYPERLIPIDEMIA: ICD-10-CM

## 2020-10-02 DIAGNOSIS — M79.7 FIBROMYALGIA: ICD-10-CM

## 2020-10-02 DIAGNOSIS — E28.2 PCOS (POLYCYSTIC OVARIAN SYNDROME): ICD-10-CM

## 2020-10-02 DIAGNOSIS — B37.9 YEAST INFECTION: ICD-10-CM

## 2020-10-02 DIAGNOSIS — M54.2 CERVICALGIA: ICD-10-CM

## 2020-10-02 PROCEDURE — 99214 OFFICE O/P EST MOD 30 MIN: CPT | Performed by: INTERNAL MEDICINE

## 2020-10-02 RX ORDER — FLUCONAZOLE 150 MG/1
150 TABLET ORAL DAILY
Qty: 1 TAB | Refills: 0 | Status: SHIPPED | OUTPATIENT
Start: 2020-10-02 | End: 2020-10-03

## 2020-10-02 RX ORDER — MICONAZOLE NITRATE 1200MG-2%
1 KIT VAGINAL
Qty: 1 KIT | Refills: 0 | Status: SHIPPED | OUTPATIENT
Start: 2020-10-02 | End: 2020-10-02

## 2020-10-02 RX ORDER — TOPIRAMATE 50 MG/1
50 TABLET, FILM COATED ORAL DAILY
COMMUNITY
End: 2020-10-07 | Stop reason: SDUPTHER

## 2020-10-02 RX ORDER — ERGOCALCIFEROL 1.25 MG/1
50000 CAPSULE ORAL
Qty: 12 CAP | Refills: 1 | Status: SHIPPED | OUTPATIENT
Start: 2020-10-02 | End: 2021-07-10

## 2020-10-02 RX ORDER — TIZANIDINE 2 MG/1
2 TABLET ORAL
Qty: 20 TAB | Refills: 0 | Status: SHIPPED | OUTPATIENT
Start: 2020-10-02 | End: 2020-10-05
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
## 2020-10-05 RX ORDER — TIZANIDINE 2 MG/1
TABLET ORAL
Qty: 20 TAB | Refills: 0 | Status: SHIPPED | OUTPATIENT
Start: 2020-10-05 | End: 2020-10-15

## 2020-10-07 RX ORDER — TOPIRAMATE 50 MG/1
50 TABLET, FILM COATED ORAL DAILY
Qty: 90 TAB | Refills: 1 | Status: SHIPPED | OUTPATIENT
Start: 2020-10-07 | End: 2021-06-04

## 2020-10-07 RX ORDER — BUPROPION HYDROCHLORIDE 150 MG/1
TABLET ORAL
Qty: 180 TAB | Refills: 1 | Status: SHIPPED | OUTPATIENT
Start: 2020-10-07 | End: 2021-06-30 | Stop reason: SDUPTHER

## 2020-10-07 NOTE — TELEPHONE ENCOUNTER
Future Appointments:  No future appointments. Last Appointment With Me:  10/2/2020     Requested Prescriptions     Pending Prescriptions Disp Refills    buPROPion XL (WELLBUTRIN XL) 150 mg tablet 180 Tab 1     Sig: TAKE 2 TABLETS BY MOUTH EVERY MORNING    topiramate (Topamax) 50 mg tablet 90 Tab 1     Sig: Take 1 Tab by mouth daily.

## 2020-10-15 RX ORDER — TIZANIDINE 2 MG/1
TABLET ORAL
Qty: 20 TAB | Refills: 0 | Status: SHIPPED | OUTPATIENT
Start: 2020-10-15 | End: 2020-10-21

## 2020-10-21 RX ORDER — TIZANIDINE 2 MG/1
TABLET ORAL
Qty: 20 TAB | Refills: 0 | Status: SHIPPED | OUTPATIENT
Start: 2020-10-21 | End: 2021-04-27

## 2020-10-27 LAB
25(OH)D3+25(OH)D2 SERPL-MCNC: 35.3 NG/ML (ref 30–100)
ALBUMIN SERPL-MCNC: 4.6 G/DL (ref 3.8–4.8)
ALBUMIN/GLOB SERPL: 2.1 {RATIO} (ref 1.2–2.2)
ALP SERPL-CCNC: 71 IU/L (ref 39–117)
ALT SERPL-CCNC: 11 IU/L (ref 0–32)
AST SERPL-CCNC: 19 IU/L (ref 0–40)
BILIRUB SERPL-MCNC: 0.3 MG/DL (ref 0–1.2)
BUN SERPL-MCNC: 9 MG/DL (ref 6–24)
BUN/CREAT SERPL: 9 (ref 9–23)
CALCIUM SERPL-MCNC: 9.5 MG/DL (ref 8.7–10.2)
CHLORIDE SERPL-SCNC: 103 MMOL/L (ref 96–106)
CHOLEST SERPL-MCNC: 237 MG/DL (ref 100–199)
CO2 SERPL-SCNC: 22 MMOL/L (ref 20–29)
CREAT SERPL-MCNC: 1 MG/DL (ref 0.57–1)
EST. AVERAGE GLUCOSE BLD GHB EST-MCNC: 105 MG/DL
GLOBULIN SER CALC-MCNC: 2.2 G/DL (ref 1.5–4.5)
GLUCOSE SERPL-MCNC: 90 MG/DL (ref 65–99)
HBA1C MFR BLD: 5.3 % (ref 4.8–5.6)
HDLC SERPL-MCNC: 67 MG/DL
LDLC SERPL CALC-MCNC: 155 MG/DL (ref 0–99)
POTASSIUM SERPL-SCNC: 4.2 MMOL/L (ref 3.5–5.2)
PROT SERPL-MCNC: 6.8 G/DL (ref 6–8.5)
SODIUM SERPL-SCNC: 138 MMOL/L (ref 134–144)
TRIGL SERPL-MCNC: 85 MG/DL (ref 0–149)
TSH SERPL DL<=0.005 MIU/L-ACNC: 1.44 UIU/ML (ref 0.45–4.5)
VLDLC SERPL CALC-MCNC: 15 MG/DL (ref 5–40)

## 2020-11-02 ENCOUNTER — OFFICE VISIT (OUTPATIENT)
Dept: URGENT CARE | Age: 40
End: 2020-11-02
Payer: COMMERCIAL

## 2020-11-02 VITALS — OXYGEN SATURATION: 99 % | TEMPERATURE: 98.6 F | HEART RATE: 71 BPM | RESPIRATION RATE: 16 BRPM

## 2020-11-02 DIAGNOSIS — Z20.822 EXPOSURE TO COVID-19 VIRUS: Primary | ICD-10-CM

## 2020-11-02 PROCEDURE — 99202 OFFICE O/P NEW SF 15 MIN: CPT | Performed by: FAMILY MEDICINE

## 2020-11-02 NOTE — PROGRESS NOTES
This patient was seen in [facility] at [address] [place] [address] Urgent Care while in their vehicle due to COVID-19 pandemic with PPE and focused examination in order to decrease community viral transmission. The patient/guardian gave verbal consent to treat. The history is provided by the patient. Nasal Congestion   This is a new problem. The current episode started 2 days ago. The problem occurs constantly. The problem has not changed since onset. Pertinent negatives include no headaches. Associated symptoms comments: Cold- congestion- stuffy. Nothing aggravates the symptoms. Nothing relieves the symptoms. She has tried nothing for the symptoms.       Her boyfriend has exposure to covid and he is waiting for test  Past Medical History:   Diagnosis Date    Autoimmune disease ([facility] [address]     fibromyalgia    Bandemia 10/2/2020    Dr Gonzalez Caceres, also [facility]    Depression     and anxiety    Fibromyalgia     GERD (gastroesophageal reflux disease)         Past Surgical History:   Procedure Laterality Date    HX GYN      fallopian tube removed after tubal pregnancy    HX GYN      hysteroscopy/laporoscopy to remove schar tissue    HX OTHER SURGICAL      oral surgery for wisdom teeth, root canal         Family History   Problem Relation Age of Onset    Hypertension Mother     Psychiatric Disorder Mother         depression/anxiety    Diabetes Maternal Grandfather     Diabetes Paternal Grandmother     Hypertension Paternal Grandmother     Stroke Paternal Grandmother         Social History     Socioeconomic History    Marital status:      Spouse name: Not on file    Number of children: Not on file    Years of education: Not on file    Highest education level: Not on file   Occupational History    Not on file   Social Needs    Financial resource strain: Not on file    Food insecurity     Worry: Not on file     Inability: Not on file    Transportation needs     Medical: Not on file Non-medical: Not on file   Tobacco Use    Smoking status: Never Smoker    Smokeless tobacco: Never Used   Substance and Sexual Activity    Alcohol use: No    Drug use: No    Sexual activity: Not on file   Lifestyle    Physical activity     Days per week: Not on file     Minutes per session: Not on file    Stress: Not on file   Relationships    Social connections     Talks on phone: Not on file     Gets together: Not on file     Attends Evangelical service: Not on file     Active member of club or organization: Not on file     Attends meetings of clubs or organizations: Not on file     Relationship status: Not on file    Intimate partner violence     Fear of current or ex partner: Not on file     Emotionally abused: Not on file     Physically abused: Not on file     Forced sexual activity: Not on file   Other Topics Concern    Not on file   Social History Narrative    Not on file                ALLERGIES: Advil pm [ibuprofen-diphenhydramine cit]; Percocet [oxycodone-acetaminophen]; and Tobramycin    Review of Systems   Neurological: Negative for headaches. All other systems reviewed and are negative. Vitals:    11/02/20 1406   Pulse: 71   Resp: 16   Temp: 98.6 °F (37 °C)   SpO2: 99%       Physical Exam  Vitals signs and nursing note reviewed. Constitutional:       General: She is not in acute distress. Appearance: She is not ill-appearing. Pulmonary:      Effort: Pulmonary effort is normal. No respiratory distress. Breath sounds: No wheezing. MDM    Procedures      ICD-10-CM ICD-9-CM    1. Exposure to COVID-19 virus  Z20.828 V01.79 NOVEL CORONAVIRUS (COVID-19)    Tested for Covid-19 and advised to quarantine until result comes back. No orders of the defined types were placed in this encounter. No results found for any visits on 11/02/20. The patients condition was discussed with the patient and they understand. The patient is to follow up with primary care doctor.   If signs and symptoms become worse the pt is to go to the ER. The patient is to take medications as prescribed.

## 2020-11-04 LAB — SARS-COV-2, NAA: NOT DETECTED

## 2020-12-14 ENCOUNTER — OFFICE VISIT (OUTPATIENT)
Dept: URGENT CARE | Age: 40
End: 2020-12-14
Payer: COMMERCIAL

## 2020-12-14 DIAGNOSIS — Z20.822 EXPOSURE TO COVID-19 VIRUS: Primary | ICD-10-CM

## 2020-12-14 PROCEDURE — 99212 OFFICE O/P EST SF 10 MIN: CPT | Performed by: FAMILY MEDICINE

## 2020-12-14 RX ORDER — LORAZEPAM 1 MG/1
TABLET ORAL
COMMUNITY
End: 2021-01-02

## 2020-12-14 NOTE — PROGRESS NOTES
This patient was seen at [address] [place] [address] Urgent Care while in their vehicle due to COVID-19 pandemic with PPE and focused examination in order to decrease community viral transmission. The patient/guardian gave verbal consent to treat. The history is provided by the patient. Headache    The history is provided by the patient. This is a new problem. The current episode started 2 days ago. The problem occurs every few minutes. The problem has not changed since onset. Associated with: uri- ? covid. The pain is located in the generalized region. The quality of the pain is described as dull. The pain is at a severity of 3/10. The pain is mild. Associated symptoms include malaise/fatigue, nausea and vomiting. Associated symptoms comments: Abdominal discomfort. She has tried nothing for the symptoms.         Past Medical History:   Diagnosis Date    Autoimmune disease ([facility] [address]     fibromyalgia    Bandemia 10/2/2020    Dr Elda Silverman, also [facility]    Depression     and anxiety    Fibromyalgia     GERD (gastroesophageal reflux disease)         Past Surgical History:   Procedure Laterality Date    HX GYN      fallopian tube removed after tubal pregnancy    HX GYN      hysteroscopy/laporoscopy to remove schar tissue    HX OTHER SURGICAL      oral surgery for wisdom teeth, root canal         Family History   Problem Relation Age of Onset    Hypertension Mother     Psychiatric Disorder Mother         depression/anxiety    Diabetes Maternal Grandfather     Diabetes Paternal Grandmother     Hypertension Paternal Grandmother     Stroke Paternal Grandmother         Social History     Socioeconomic History    Marital status:      Spouse name: Not on file    Number of children: Not on file    Years of education: Not on file    Highest education level: Not on file   Occupational History    Not on file   Social Needs    Financial resource strain: Not on file    Food insecurity     Worry: Not on file     Inability: Not on file    Transportation needs     Medical: Not on file     Non-medical: Not on file   Tobacco Use    Smoking status: Never Smoker    Smokeless tobacco: Never Used   Substance and Sexual Activity    Alcohol use: No    Drug use: No    Sexual activity: Not on file   Lifestyle    Physical activity     Days per week: Not on file     Minutes per session: Not on file    Stress: Not on file   Relationships    Social connections     Talks on phone: Not on file     Gets together: Not on file     Attends Adventist service: Not on file     Active member of club or organization: Not on file     Attends meetings of clubs or organizations: Not on file     Relationship status: Not on file    Intimate partner violence     Fear of current or ex partner: Not on file     Emotionally abused: Not on file     Physically abused: Not on file     Forced sexual activity: Not on file   Other Topics Concern    Not on file   Social History Narrative    Not on file                ALLERGIES: Advil pm [ibuprofen-diphenhydramine cit]; Percocet [oxycodone-acetaminophen]; and Tobramycin    Review of Systems   Constitutional: Positive for malaise/fatigue. Gastrointestinal: Positive for nausea and vomiting. Neurological: Positive for headaches. All other systems reviewed and are negative. There were no vitals filed for this visit. Physical Exam  Vitals signs and nursing note reviewed. Constitutional:       General: She is not in acute distress. Appearance: She is not ill-appearing. Pulmonary:      Effort: Pulmonary effort is normal. No respiratory distress. Breath sounds: No wheezing. MDM    Procedures        ICD-10-CM ICD-9-CM    1. Exposure to COVID-19 virus  Z20.828 V01.79 NOVEL CORONAVIRUS (COVID-19)     No orders of the defined types were placed in this encounter. No results found for any visits on 12/14/20.   The patients condition was discussed with the patient and they understand. The patient is to follow up with primary care doctor. If signs and symptoms become worse the pt is to go to the ER. The patient is to take medications as prescribed.

## 2020-12-16 LAB — SARS-COV-2, NAA: NOT DETECTED

## 2021-01-02 ENCOUNTER — OFFICE VISIT (OUTPATIENT)
Dept: URGENT CARE | Age: 41
End: 2021-01-02
Payer: COMMERCIAL

## 2021-01-02 VITALS — TEMPERATURE: 99.1 F | HEART RATE: 74 BPM | RESPIRATION RATE: 18 BRPM | OXYGEN SATURATION: 99 %

## 2021-01-02 DIAGNOSIS — J02.9 SORE THROAT: ICD-10-CM

## 2021-01-02 DIAGNOSIS — Z20.822 SUSPECTED COVID-19 VIRUS INFECTION: Primary | ICD-10-CM

## 2021-01-02 LAB
S PYO AG THROAT QL: NEGATIVE
VALID INTERNAL CONTROL?: YES

## 2021-01-02 PROCEDURE — 99212 OFFICE O/P EST SF 10 MIN: CPT | Performed by: NURSE PRACTITIONER

## 2021-01-02 PROCEDURE — 87880 STREP A ASSAY W/OPTIC: CPT | Performed by: NURSE PRACTITIONER

## 2021-01-02 NOTE — PROGRESS NOTES
This patient was seen at [address] [place] [address] Urgent Care while in their vehicle due to COVID-19 pandemic with PPE and focused examination in order to decrease community viral transmission. The patient/guardian gave verbal consent to treat. Isabell Hoffman is a 41 y.o. female presenting to clinic today for COVID-19 testing. She states that she was around family over the Christmas holiday and has since developed throat pain and fever. Endorses some yellow exudate on her tonsils two days ago. Also endorses right ear pain, ongoing x2 weeks. Denies chest pain or shortness of breath. The history is provided by the patient. History limited by: nothing. Cold Symptoms  Associated symptoms include ear pain and sore throat. Pertinent negatives include no chest pain, no chills, no rhinorrhea, no shortness of breath and no wheezing.         Past Medical History:   Diagnosis Date    Autoimmune disease ([facility] [address]     fibromyalgia    Bandemia 10/2/2020    Dr Candice Eagle, also [facility]    Depression     and anxiety    Fibromyalgia     GERD (gastroesophageal reflux disease)         Past Surgical History:   Procedure Laterality Date    HX GYN      fallopian tube removed after tubal pregnancy    HX GYN      hysteroscopy/laporoscopy to remove schar tissue    HX OTHER SURGICAL      oral surgery for wisdom teeth, root canal         Family History   Problem Relation Age of Onset    Hypertension Mother     Psychiatric Disorder Mother         depression/anxiety    Diabetes Maternal Grandfather     Diabetes Paternal Grandmother     Hypertension Paternal Grandmother     Stroke Paternal Grandmother         Social History     Socioeconomic History    Marital status:      Spouse name: Not on file    Number of children: Not on file    Years of education: Not on file    Highest education level: Not on file   Occupational History    Not on file   Social Needs    Financial resource strain: Not on file   [facility] Food insecurity     Worry: Not on file     Inability: Not on file    Transportation needs     Medical: Not on file     Non-medical: Not on file   Tobacco Use    Smoking status: Never Smoker    Smokeless tobacco: Never Used   Substance and Sexual Activity    Alcohol use: No    Drug use: No    Sexual activity: Not on file   Lifestyle    Physical activity     Days per week: Not on file     Minutes per session: Not on file    Stress: Not on file   Relationships    Social connections     Talks on phone: Not on file     Gets together: Not on file     Attends Anglican service: Not on file     Active member of club or organization: Not on file     Attends meetings of clubs or organizations: Not on file     Relationship status: Not on file    Intimate partner violence     Fear of current or ex partner: Not on file     Emotionally abused: Not on file     Physically abused: Not on file     Forced sexual activity: Not on file   Other Topics Concern    Not on file   Social History Narrative    Not on file                ALLERGIES: Advil pm [ibuprofen-diphenhydramine cit], Percocet [oxycodone-acetaminophen], and Tobramycin    Review of Systems   Constitutional: Negative for chills and fever. HENT: Positive for ear pain and sore throat. Negative for congestion, ear discharge, rhinorrhea and sneezing. Respiratory: Negative for cough, chest tightness, shortness of breath and wheezing. Cardiovascular: Negative for chest pain. Vitals:    01/02/21 1750   Pulse: 74   Resp: 18   Temp: 99.1 °F (37.3 °C)   SpO2: 99%       Physical Exam  Vitals signs and nursing note reviewed. Constitutional:       General: She is not in acute distress. Appearance: Normal appearance. She is not ill-appearing, toxic-appearing or diaphoretic. HENT:      Head: Normocephalic and atraumatic.       Ears:      Comments: *Patient examined in her vehicle after sunset*  L TM pearly gray, no erythema, no effusion, no bulging  R TM dull, no erythema, +effusion, no bulging  Tonsils 2+BL, no erythema, no exudate, uvula midline. Eyes:      Extraocular Movements: Extraocular movements intact. Conjunctiva/sclera: Conjunctivae normal.   Neck:      Musculoskeletal: Normal range of motion. Cardiovascular:      Rate and Rhythm: Normal rate. Pulmonary:      Effort: Pulmonary effort is normal. No respiratory distress. Comments: Speaking in complete sentences without difficulty. Musculoskeletal: Normal range of motion. Neurological:      General: No focal deficit present. Mental Status: She is alert. Psychiatric:         Mood and Affect: Mood normal.         Behavior: Behavior normal.         Thought Content: Thought content normal.         Judgment: Judgment normal.         MDM  PLAN:  1. COVID-19 test obtained. Patient to self-quarantine until results return. 2. Rapid strep negative, no indication for throat culture  3. Advised OTC flonase for right ear effusion. 4. OTC for symptom management. Increase fluid intake, ensure adequate nutritional intake. 5. Follow up with PCP as needed. 6. Go to ED with development of any acute symptoms. DIAGNOSIS:    ICD-10-CM ICD-9-CM    1. Suspected COVID-19 virus infection  Z20.822 V01.79    2.  Sore throat  J02.9 462 AMB POC RAPID STREP A      NOVEL CORONAVIRUS (COVID-19)     Procedures

## 2021-01-07 LAB — SARS-COV-2, NAA: NOT DETECTED

## 2021-02-03 RX ORDER — ERGOCALCIFEROL 1.25 MG/1
CAPSULE ORAL
Qty: 16 CAP | Refills: 0 | Status: SHIPPED | OUTPATIENT
Start: 2021-02-03 | End: 2021-04-27

## 2021-03-10 ENCOUNTER — TELEPHONE (OUTPATIENT)
Dept: INTERNAL MEDICINE CLINIC | Age: 41
End: 2021-03-10

## 2021-03-10 NOTE — TELEPHONE ENCOUNTER
Patient states she needs to get Lab Order faxed over Asap to [facility] [facility] as she is there now. Please call if any questions.  Thank you    [employer] Fax# is [phone]

## 2021-03-11 ENCOUNTER — TRANSCRIBE ORDER (OUTPATIENT)
Dept: SCHEDULING | Age: 41
End: 2021-03-11

## 2021-03-11 DIAGNOSIS — Z12.31 VISIT FOR SCREENING MAMMOGRAM: Primary | ICD-10-CM

## 2021-03-15 ENCOUNTER — TRANSCRIBE ORDER (OUTPATIENT)
Dept: SCHEDULING | Age: 41
End: 2021-03-15

## 2021-03-15 DIAGNOSIS — N64.4 BREAST PAIN: Primary | ICD-10-CM

## 2021-03-26 ENCOUNTER — HOSPITAL ENCOUNTER (OUTPATIENT)
Dept: ULTRASOUND IMAGING | Age: 41
Discharge: HOME | End: 2021-03-26
Attending: OBSTETRICS & GYNECOLOGY
Payer: COMMERCIAL

## 2021-03-26 ENCOUNTER — HOSPITAL ENCOUNTER (OUTPATIENT)
Dept: MAMMOGRAPHY | Age: 41
Discharge: HOME | End: 2021-03-26
Attending: OBSTETRICS & GYNECOLOGY
Payer: COMMERCIAL

## 2021-03-26 ENCOUNTER — HOSPITAL ENCOUNTER (OUTPATIENT)
Dept: NON INVASIVE DIAGNOSTICS | Age: 41
Discharge: HOME | End: 2021-03-26
Attending: INTERNAL MEDICINE
Payer: COMMERCIAL

## 2021-03-26 DIAGNOSIS — N64.4 BREAST PAIN: ICD-10-CM

## 2021-03-26 DIAGNOSIS — R00.2 PALPITATION: ICD-10-CM

## 2021-03-26 PROCEDURE — 77066 DX MAMMO INCL CAD BI: CPT

## 2021-03-26 PROCEDURE — 76642 ULTRASOUND BREAST LIMITED: CPT

## 2021-03-26 PROCEDURE — 93271 ECG/MONITORING AND ANALYSIS: CPT

## 2021-03-26 NOTE — PROGRESS NOTES
Pt has mammo prior to event monitor placement appointment - pt needs to go for additional bilateral breast imaging and mammography request not to have monitor place immediately because it will be in the way of additional images needed.      Reviewed how to place and monitor with pt - offered to move appointment to Monday and pt declined and said that she understood how to place monitor and how to navigate through the steps on cell phone.  Packet given to pt.  Pt stated she was very confident that the booklet was clear and wanted to take box with her to place after the additional bilateral mammo views and ultrasound images of breasts.      Reviewed and demonstrated placement w/card draped around pt's neck as instruction book showed.  Placed and snapped white monitor into blue piece to show how it needs to snap into place (also showed top of box and book instructions).  Pt turned on cell phone monitor and said she would continue once she placed the monitor on area to obtain baseline.

## 2021-04-02 ENCOUNTER — TELEPHONE (OUTPATIENT)
Dept: INTERNAL MEDICINE CLINIC | Age: 41
End: 2021-04-02

## 2021-04-02 NOTE — TELEPHONE ENCOUNTER
----- Message from Verónica Feliz sent at 4/2/2021 11:01 AM EDT -----  Regarding: /Telephone  General Message/Vendor Calls    Caller's first and last name: Pt       Reason for call: Requesting a call back from Dr. Clemencia Wooten about her device saying it needs to be returned today       Callback required yes/no and why: yes      Best contact number(s): [phone]      Details to clarify the request: She states that she must have a call back today.         Message from [facility]

## 2021-04-02 NOTE — LETTER
4/5/2021 12:41 PM    Ms. Carmel Martínez  1980. [id number] [facility] [id number]        To whom this may concern,     This is a request for an extension for Ms. Martínez's Cardiac Event Monitor most recently ordered. It is requested for Ms. Martínez to have a 1 week extension. If there are any questions or concerns, please feel free to contact the office. Thank you.           Sincerely,      Federico Smiley MD

## 2021-04-02 NOTE — TELEPHONE ENCOUNTER
Called out and spoke to pt. Two pt identifiers confirmed. Pt states that she only has had the event monitor for a week and feels that it is not enough time to get her results. Pt states that she was told when getting the event monitor pt should have it more than a week. Pt asking for it to be extended. Pt informed Dr. Chris Guzmán will be consulted for further recommendations. Pt verbalized understanding of information discussed w/ no further questions at this time.

## 2021-04-05 NOTE — TELEPHONE ENCOUNTER
Spoke to Polly underwood/ [employer] to inquire on placing an extension for pt's cardiac event monitor. Polly Carlos states that a letter on a letterhead of the physician is sufficient:Pt's info and how long extension is needed. Bee Ledesma; letter order drafted. Faxed to [employer] at [phone] w/ confirmation received.

## 2021-04-05 NOTE — TELEPHONE ENCOUNTER
MD Valeria Hernandez LPN   Caller: Unspecified (3 days ago, 11:08 AM)             2 weeks total is fine

## 2021-04-05 NOTE — TELEPHONE ENCOUNTER
Called out and spoke to pt. Two pt identifiers confirmed. Pt informed per Dr. Chris Guzmán that the Cardiac Event Monitor can be extended 1wk. Informed pt that RITCHIE HKAN will contact [facility] to have Event Monitor extended. Pt verbalized understanding of information discussed w/ no further questions at this time.

## 2021-04-05 NOTE — TELEPHONE ENCOUNTER
Spoke to Connor Haney w/ ED [facility] to inquire on extending pt's Cardiac Event Monitor. Connor Haney states to contact [employer]--fax a continuation of service request to [phone] (fax). (phone) [phone].

## 2021-04-07 ENCOUNTER — TELEPHONE (OUTPATIENT)
Dept: INTERNAL MEDICINE CLINIC | Age: 41
End: 2021-04-07

## 2021-04-07 DIAGNOSIS — I47.1 PSVT (PAROXYSMAL SUPRAVENTRICULAR TACHYCARDIA) (HCC): Primary | ICD-10-CM

## 2021-04-07 NOTE — PROGRESS NOTES
Please call patient back about results  Fast heart rate found n event monitor send her to cardiology

## 2021-04-07 NOTE — TELEPHONE ENCOUNTER
Called, spoke to pt  Received two pt identifiers  Pt states wants 1 month for the monitor. States it does not happen often and 2 weeks would not be enough. States she discussed it with Nay Soto. Nothing noted in prior conversations. Pt voices frustration. Pt states she should not have to call to get the monitor back. Pt states we should have to call the company. Informed pt we did send another order/letter to [employer] stating to extend it another week and that they usually send it overnight. Pt advised Dr. Julien Hdz is out of the office and will be back next week but periodically checks her computer at home. Pt voices frustration that Dr. Julien Hdz is never there when you need her. Advised pt will fwd to Dr. Julien Hdz and notify naomy when he gets back tomorrow. Pt verbalizes understanding of the instructions and has no further questions at this time.

## 2021-04-07 NOTE — TELEPHONE ENCOUNTER
Pt requesting update regarding cardiac monitor. She states she hasnt received anything yet. I informed her the letter was sent to [facility] on 4/5. She requests an update on how long until she receives it. Please call to advise.     [phone]

## 2021-04-08 NOTE — TELEPHONE ENCOUNTER
Spoke to [employer] w/ [employer]. Informed Melchor that the second order for pts event monitor needs to be cancelled. [employer] states that they have not sent out a second monitor and they placed the extension on the monitor pt had first.  Informed Melchor that pt believed to mail the monitor back already--advised nothing else/no further test is needed at this time. Melchor verbalized understanding of information discussed w/ no further questions at this time.

## 2021-04-08 NOTE — PROGRESS NOTES
Called out and spoke to pt. Two pt identifiers confirmed. Pt informed per Dr. Elinor Jean-Baptiste that Event Monitor showed PSVT and should see Cardiology--referral given. Pt verbalized understanding of information discussed w/ no further questions at this time.

## 2021-04-08 NOTE — TELEPHONE ENCOUNTER
MD Jluis Aquino Aisha Kobus 18 hours ago (3:26 PM)     Her event monitor did show psvt -see result note-a fast rhythm, given she also would like additional monitoring send to cardiology    Message text       MD Jluis Aquino Joshua 19 hours ago (3:10 PM)     If symptoms are less than at least every 2 weeks it is hard to catch them.      At this point lets have her see cardiologist there may be some other options that they can order

## 2021-04-08 NOTE — TELEPHONE ENCOUNTER
Called out and spoke to pt. Two pt identifiers confirmed. Pt informed of imaging results. See recent imaging result encounter. Event Monitor. Jad Galeano Linda; referral placed. Informed pt that RITCHIE KHAN will call [employer] to cancel second order for event monitor. Pt verbalized understanding of information discussed w/ no further questions at this time.

## 2021-04-27 ENCOUNTER — CLINICAL SUPPORT (OUTPATIENT)
Dept: CARDIOLOGY CLINIC | Age: 41
End: 2021-04-27
Payer: COMMERCIAL

## 2021-04-27 ENCOUNTER — OFFICE VISIT (OUTPATIENT)
Dept: CARDIOLOGY CLINIC | Age: 41
End: 2021-04-27

## 2021-04-27 VITALS
HEIGHT: 67 IN | DIASTOLIC BLOOD PRESSURE: 78 MMHG | WEIGHT: 174.3 LBS | SYSTOLIC BLOOD PRESSURE: 128 MMHG | OXYGEN SATURATION: 100 % | RESPIRATION RATE: 16 BRPM | HEART RATE: 75 BPM | BODY MASS INDEX: 27.36 KG/M2

## 2021-04-27 DIAGNOSIS — F41.9 ANXIETY: ICD-10-CM

## 2021-04-27 DIAGNOSIS — I47.1 PSVT (PAROXYSMAL SUPRAVENTRICULAR TACHYCARDIA) (HCC): ICD-10-CM

## 2021-04-27 DIAGNOSIS — R07.9 CHEST PAIN, UNSPECIFIED TYPE: Primary | ICD-10-CM

## 2021-04-27 DIAGNOSIS — I47.1 PSVT (PAROXYSMAL SUPRAVENTRICULAR TACHYCARDIA) (HCC): Primary | ICD-10-CM

## 2021-04-27 PROCEDURE — 93000 ELECTROCARDIOGRAM COMPLETE: CPT | Performed by: INTERNAL MEDICINE

## 2021-04-27 PROCEDURE — 93228 REMOTE 30 DAY ECG REV/REPORT: CPT | Performed by: INTERNAL MEDICINE

## 2021-04-27 PROCEDURE — 99204 OFFICE O/P NEW MOD 45 MIN: CPT | Performed by: INTERNAL MEDICINE

## 2021-04-27 RX ORDER — ACETAMINOPHEN AND CODEINE PHOSPHATE 120; 12 MG/5ML; MG/5ML
1 SOLUTION ORAL DAILY
COMMUNITY
End: 2021-12-13

## 2021-04-27 RX ORDER — GUAIFENESIN 100 MG/5ML
81 LIQUID (ML) ORAL DAILY
COMMUNITY
End: 2021-12-13

## 2021-04-27 NOTE — PROGRESS NOTES
Patient received a 30 day event monitor. Instructions given verbally as well as an instruction sheet. Pt verbalized understanding.     [facility] Event Monitoring

## 2021-04-27 NOTE — LETTER
4/27/2021    Patient: Anthony Mckeon   YOB: 1980   Date of Visit: 4/27/2021     Tomas Kumar MD  [address]  [address] [facility] [address]  [address]  [address] [employer]    Dear Tomas Kumar MD,      Thank you for referring Ms. Carmel Watts to [address] [place] [address] for evaluation. My notes for this consultation are attached. If you have questions, please do not hesitate to call me. I look forward to following your patient along with you.       Sincerely,    Chris Molina MD

## 2021-04-27 NOTE — PROGRESS NOTES
Chief Complaint   Patient presents with    SVT     c/o chest pain, irregular heartbeat and rapid heart rate, migriane x 3 days; Denies lightheadness, dizzinessDiscuss Event Monitor Results; Visit Vitals  /78 (BP 1 Location: Left arm, BP Patient Position: Sitting, BP Cuff Size: Adult)   Pulse 75   Resp 16   Ht 5' 7\" (1.702 m)   Wt 174 lb 4.8 oz (79.1 kg)   SpO2 100%   BMI 27.30 kg/m²     1. Have you been to the ER, urgent care clinic since your last visit? Hospitalized since your last visit? January 16, 2021 [facility] Neck/Back Pain     2. Have you seen or consulted any other health care providers outside of the [address] [place] [address] since your last visit? Include any pap smears or colon screening.  No

## 2021-04-27 NOTE — PROGRESS NOTES
Rowan Thomas, [facility]    Subjective/HPI:     Carmel Hdz is a 35 y.o. female is here to establish care. She has a PMHx of PCOS and migraines. She was referred for abnormal event monitor and chest pain symptoms. Pt reports heart racing symptoms and chest pain ongoing for the past year. Has discussed this with PCP but is frustrated because it has taken so long for workup to be done. Pt reports episodes of heart racing associated with chest pain, shortness of breath and dizziness. Recently, she has also felt like she might pass out with these symptoms, but has not had syncopal spell. Denies chest pain, shortness of breath or dizziness in the absence of palpitations. Symptoms come on at any point in time, either at rest or while driving. Was trying to exercise, but then stopped because she was concerned about her heart. Pt expresses significant life stressors and increased anxiety. States in the past 2 years, has lost a grandparent, both parents, and also has had a lot of work stress during the pandemic. Has also felt social isolation in the past year and feels her mental health is not the best.  She feels stressed out from work and feels incredibly anxious, describing panic attacks from all the stressors. She feels the only way she can focus on herself and recharge is to take a leave of absence from work. She has been seeing a licensed counselor, but feels therapy is not enough. She is tearful in the office while discussing stressors, stating that she just wants something to help overcome anxiety for a short period of time. She tells me PCP has not been listening to her and writes her symptoms off and does not want to prescribe her medications, which frustrates her because she does not display addictive/substance seeking behavior. She is a non smoker. Does not drink alcohol. No previous cardiac history.     Current Outpatient Medications on File Prior to Visit Medication Sig Dispense Refill    aspirin 81 mg chewable tablet Take 81 mg by mouth daily.  norethindrone (MICRONOR) 0.35 mg tab Take 1 Tab by mouth daily.  buPROPion XL (WELLBUTRIN XL) 150 mg tablet TAKE 2 TABLETS BY MOUTH EVERY MORNING (Patient taking differently: Take 150 mg by mouth every morning.) 180 Tab 1    topiramate (Topamax) 50 mg tablet Take 1 Tab by mouth daily. 90 Tab 1    ergocalciferol (ERGOCALCIFEROL) 1,250 mcg (50,000 unit) capsule Take 1 Cap by mouth every seven (7) days. 12 Cap 1    albuterol (PROAIR HFA) 90 mcg/actuation inhaler ProAir HFA 90 mcg/actuation aerosol inhaler 1 Inhaler 0    metFORMIN (GLUMETZA ER) 500 mg TG24 24 hour tablet Take 500 mg by mouth daily.  IBUPROFEN PO Take 800 mg by mouth as needed.  L.acid/L.casei/B.bif/B.donnie/FOS (PROBIOTIC BLEND PO) Take  by mouth. From [address] [place] [address],[facility]      cyclobenzaprine (FLEXERIL) 10 mg tablet TAKE 1 TABLET BY MOUTH EVERY NIGHT AT BEDTIME AS NEEDED FOR SPASM      [DISCONTINUED] ergocalciferol (ERGOCALCIFEROL) 1,250 mcg (50,000 unit) capsule TAKE 1 CAPSULE BY MOUTH EVERY 7 DAYS. 16 Cap 0    [DISCONTINUED] tiZANidine (ZANAFLEX) 2 mg tablet TAKE 1 TABLET BY MOUTH THREE TIMES DAILY AS NEEDED FOR MUSCLE SPASMS 20 Tab 0    acetaminophen (TYLENOL EXTRA STRENGTH) 500 mg tablet Take  by mouth every six (6) hours as needed for Pain.  [DISCONTINUED] OTHER,NON-FORMULARY, Black seed oil 1X/day       No current facility-administered medications on file prior to visit. Review of Symptoms:    Review of Systems   Constitutional: Negative for chills, fever and weight loss. HENT: Negative for nosebleeds. Eyes: Negative for blurred vision and double vision. Respiratory: Positive for shortness of breath. Negative for cough and wheezing. Cardiovascular: Positive for chest pain and palpitations. Negative for orthopnea, leg swelling and PND.    Gastrointestinal: Negative for abdominal pain, blood in stool, diarrhea, nausea and vomiting. Musculoskeletal: Negative for joint pain. Skin: Negative for rash. Neurological: Negative for dizziness, tingling and loss of consciousness. Endo/Heme/Allergies: Does not bruise/bleed easily. Psychiatric/Behavioral: The patient is nervous/anxious. Physical Exam:      General: Well developed, in no acute distress, cooperative and alert  Heart:  reg rate and rhythm; normal S1/S2; no murmurs, no gallops or rubs. Respiratory: Clear bilaterally x 4, no wheezing or rales  Extremities:  Normal cap refill, no cyanosis, atraumatic. No edema. Vascular: 2+ pulses symmetric in all extremities    Vitals:    04/27/21 0932   BP: 128/78   Pulse: 75   Resp: 16   SpO2: 100%   Weight: 174 lb 4.8 oz (79.1 kg)   Height: 5' 7\" (1.702 m)       ECG done today shows sinus rhythm     Assessment:       ICD-10-CM ICD-9-CM    1. Chest pain, unspecified type  R07.9 786.50 AMB POC EKG ROUTINE W/ 12 LEADS, INTER & REP      ECHO ADULT COMPLETE      EXERCISE CARDIAC STRESS TEST      LOOP MONITOR   2. PSVT (paroxysmal supraventricular tachycardia) ([facility])  I47.1 427.0 ECHO ADULT COMPLETE      EXERCISE CARDIAC STRESS TEST      LOOP MONITOR   3. Anxiety  F41.9 300.00 REFERRAL TO BEHAVIORAL HEALTH        Plan:     1. Chest pain, unspecified type  Atypical symptoms, likely related to anxiety and underlying arrhythmia  Minimal risk factors  Will evaluate with exercise treadmill stress test    2. PSVT (paroxysmal supraventricular tachycardia) ([facility])  1-week event monitor with 9 beats of PSVT, otherwise NSR  Pt did not have symptoms during monitoring period  Will obtain 30-day event monitor for better correlation with symptoms  Likely would benefit from BB therapy    3.  Anxiety  Significant life stressors, with recent hardships  Advised this could be playing a role in her cardiac symptoms  Will refer to Rubén Lau NP for counseling and possible medical management    F/u with Dr. El Ornelas after testing complete    Patient was made aware during visit today that all testing completed would be instantaneously available on their MyChart for review. Discussed that these results will be made available to the provider at the same time. They were advised to wait at least 3 business days to allow for provider's interpretation of results with follow-up before calling our office with concerns about their results. Elliot Ramirez NP       [facility]    4/27/2021         Patient seen, examined by me personally. Plan discussed as detailed. Agree with note as outlined by  NP with modifications as noted. My independent physical exam reveals : Physical Exam   Constitutional: She is oriented to person, place, and time. She appears well-developed and well-nourished. HENT:   Head: Normocephalic. Eyes: Conjunctivae are normal.   Neck: Neck supple. Cardiovascular: Normal rate and normal heart sounds. No murmur heard. Pulmonary/Chest: Effort normal and breath sounds normal.   Musculoskeletal:         General: No edema. Neurological: She is alert and oriented to person, place, and time. Skin: Skin is warm and dry. Psychiatric: She has a normal mood and affect. Nursing note and vitals reviewed. Lot of her symptoms appear to be secondary to stress. Discussed exercise, meditation, relaxing techniques. Will evaluate as noted. No additional findings noted. Agree with plan as outlined above with modifications as noted.      Satnam Latham MD

## 2021-06-02 ENCOUNTER — PATIENT MESSAGE (OUTPATIENT)
Dept: CARDIOLOGY CLINIC | Age: 41
End: 2021-06-02

## 2021-06-04 RX ORDER — TOPIRAMATE 50 MG/1
TABLET, FILM COATED ORAL
Qty: 90 TABLET | Refills: 1 | Status: SHIPPED | OUTPATIENT
Start: 2021-06-04 | End: 2022-02-21 | Stop reason: ALTCHOICE

## 2021-06-08 ENCOUNTER — ANCILLARY PROCEDURE (OUTPATIENT)
Dept: CARDIOLOGY CLINIC | Age: 41
End: 2021-06-08
Payer: COMMERCIAL

## 2021-06-08 VITALS
HEIGHT: 67 IN | WEIGHT: 174 LBS | SYSTOLIC BLOOD PRESSURE: 120 MMHG | BODY MASS INDEX: 27.31 KG/M2 | DIASTOLIC BLOOD PRESSURE: 80 MMHG

## 2021-06-08 DIAGNOSIS — I47.1 PSVT (PAROXYSMAL SUPRAVENTRICULAR TACHYCARDIA) (HCC): ICD-10-CM

## 2021-06-08 DIAGNOSIS — R07.9 CHEST PAIN, UNSPECIFIED TYPE: ICD-10-CM

## 2021-06-08 LAB
STRESS ANGINA INDEX: 0
STRESS BASELINE DIAS BP: 80 MMHG
STRESS BASELINE HR: 74 BPM
STRESS BASELINE SYS BP: 120 MMHG
STRESS ESTIMATED WORKLOAD: 10.1 METS
STRESS EXERCISE DUR MIN: NORMAL MIN:SEC
STRESS O2 SAT PEAK: 99 %
STRESS O2 SAT REST: 97 %
STRESS PEAK DIAS BP: 80 MMHG
STRESS PEAK SYS BP: 145 MMHG
STRESS PERCENT HR ACHIEVED: 94 %
STRESS POST PEAK HR: 166 BPM
STRESS RATE PRESSURE PRODUCT: NORMAL BPM*MMHG
STRESS SR DUKE TREADMILL SCORE: 9
STRESS ST DEPRESSION: 0 MM
STRESS ST ELEVATION: 0 MM
STRESS STAGE 1 BP: NORMAL MMHG
STRESS STAGE 1 DURATION: 3 MIN:SEC
STRESS STAGE 1 HR: 109 BPM
STRESS STAGE 2 BP: NORMAL MMHG
STRESS STAGE 2 DURATION: 3 MIN:SEC
STRESS STAGE 2 HR: 133 BPM
STRESS STAGE 3 DURATION: 3 MIN:SEC
STRESS STAGE 3 HR: 166 BPM
STRESS STAGE RECOVERY 1 BP: NORMAL MMHG
STRESS STAGE RECOVERY 1 DURATION: 5 MIN:SEC
STRESS STAGE RECOVERY 1 HR: 92 BPM
STRESS TARGET HR: 177 BPM

## 2021-06-08 PROCEDURE — 93015 CV STRESS TEST SUPVJ I&R: CPT | Performed by: INTERNAL MEDICINE

## 2021-06-08 RX ORDER — METOPROLOL SUCCINATE 25 MG/1
25 TABLET, EXTENDED RELEASE ORAL DAILY
Qty: 15 TABLET | Refills: 1 | Status: SHIPPED | OUTPATIENT
Start: 2021-06-08 | End: 2021-07-08

## 2021-06-08 NOTE — PROGRESS NOTES
Jocelyn Ms. Emiliana Houston,    Your event monitor was essentially normal.  You had a few extra heart beats that originate from the top of your heart, called premature atrial complexes (PACs). These were very rare, and generally are not concerning and do not warrant treatment. At the time you reported symptoms, however, your heart rates were pretty fast, and I suspect this is the cause of your palpitations. If you would like, we can start a small dose of a medication that can help slow your heart rates down and see if that helps to improve your symptoms. We are still waiting for you to complete your stress test and echocardiogram.  If you'd like to wait until we have these test results before deciding on medications, that is fine. Otherwise, if you want to try the medication out, let me know and I can send a prescription to your local pharmacy.     Thanks,  Sam Loco, FNP-BC

## 2021-06-08 NOTE — TELEPHONE ENCOUNTER
Spoke with pt while she was here to complete exercise stress test regarding starting BB therapy for symptomatic sinus tachycardia. Pt is willing to trial low dose medication with the understanding she can come off it if she desires. Toprol 12.5 mg PO daily sent to local pharmacy. Advised to take medication at night. Recommended she send me a [employer] message in about 2 weeks to assess response.     9 Jessica Garrison NP  6/8/2021   9:35 AM

## 2021-06-09 ENCOUNTER — ANCILLARY PROCEDURE (OUTPATIENT)
Dept: CARDIOLOGY CLINIC | Age: 41
End: 2021-06-09
Payer: COMMERCIAL

## 2021-06-09 VITALS
DIASTOLIC BLOOD PRESSURE: 78 MMHG | BODY MASS INDEX: 27.31 KG/M2 | SYSTOLIC BLOOD PRESSURE: 128 MMHG | WEIGHT: 174 LBS | HEIGHT: 67 IN

## 2021-06-09 DIAGNOSIS — I47.1 PSVT (PAROXYSMAL SUPRAVENTRICULAR TACHYCARDIA) (HCC): ICD-10-CM

## 2021-06-09 DIAGNOSIS — R07.9 CHEST PAIN, UNSPECIFIED TYPE: ICD-10-CM

## 2021-06-09 PROCEDURE — 93306 TTE W/DOPPLER COMPLETE: CPT | Performed by: INTERNAL MEDICINE

## 2021-06-09 NOTE — TELEPHONE ENCOUNTER
----- Message from Nithya Burks RN sent at 6/8/2021  3:46 PM EDT -----  Regarding: FW: Test Results Question  Contact: [phone]  Federico Alvarez,     Can you look into this please? ... Thanks,     Tristin Viramontes   ----- Message -----  From: Clemencia Muir  Sent: 6/2/2021  10:44 PM EDT  To: [facility] Nurses  Subject: Test Results Question                            I am not sure what this means. Could you please confirm? I sent back the heart monitor  last month about 2 weeks prior to my end date. I was advised to.do.sonafter experiencing severe skin irritation with both the patch and stems from the device. Further elaboration on this message would be appreciated.     Sincerely,     Jesus Rain

## 2021-06-09 NOTE — TELEPHONE ENCOUNTER
Verified patient with two identifiers. Pt did not understand the my chart message that [employer] sent her. I went over the results with her and answered her questions. She did complete the stress test and echo. She also got the medication. I informed her to let us know how she feels on the medication. She thanked me for the call.

## 2021-06-10 LAB
ECHO AO ASC DIAM: 2.84 CM
ECHO AO ROOT DIAM: 2.85 CM
ECHO AV AREA PEAK VELOCITY: 2.51 CM2
ECHO AV AREA/BSA PEAK VELOCITY: 1.3 CM2/M2
ECHO AV PEAK GRADIENT: 7.69 MMHG
ECHO AV PEAK VELOCITY: 138.66 CM/S
ECHO LA AREA 4C: 18.7 CM2
ECHO LA MAJOR AXIS: 2.65 CM
ECHO LA MINOR AXIS: 1.39 CM
ECHO LA VOL 2C: 64.51 ML (ref 22–52)
ECHO LA VOL 4C: 47.74 ML (ref 22–52)
ECHO LA VOL BP: 60.13 ML (ref 22–52)
ECHO LA VOL/BSA BIPLANE: 31.48 ML/M2 (ref 16–28)
ECHO LA VOLUME INDEX A2C: 33.77 ML/M2 (ref 16–28)
ECHO LA VOLUME INDEX A4C: 24.99 ML/M2 (ref 16–28)
ECHO LV E' LATERAL VELOCITY: 13.19 CM/S
ECHO LV E' SEPTAL VELOCITY: 15.76 CM/S
ECHO LV INTERNAL DIMENSION DIASTOLIC: 3.97 CM (ref 3.9–5.3)
ECHO LV INTERNAL DIMENSION SYSTOLIC: 2.75 CM
ECHO LV IVSD: 0.88 CM (ref 0.6–0.9)
ECHO LV MASS 2D: 105.9 G (ref 67–162)
ECHO LV MASS INDEX 2D: 55.4 G/M2 (ref 43–95)
ECHO LV POSTERIOR WALL DIASTOLIC: 0.89 CM (ref 0.6–0.9)
ECHO LVOT DIAM: 1.99 CM
ECHO LVOT PEAK GRADIENT: 5.01 MMHG
ECHO LVOT PEAK VELOCITY: 111.89 CM/S
ECHO LVOT SV: 66.6 ML
ECHO LVOT VTI: 21.44 CM
ECHO MV A VELOCITY: 40.97 CM/S
ECHO MV E DECELERATION TIME (DT): 224.71 MS
ECHO MV E VELOCITY: 73.66 CM/S
ECHO MV E/A RATIO: 1.8
ECHO MV E/E' LATERAL: 5.58
ECHO MV E/E' RATIO (AVERAGED): 5.13
ECHO MV E/E' SEPTAL: 4.67
ECHO RA AREA 4C: 20.06 CM2
ECHO RV TAPSE: 2.73 CM (ref 1.5–2)
LA VOL DISK BP: 56.82 ML (ref 22–52)
LVOT MG: 2.55 MMHG

## 2021-06-11 NOTE — PROGRESS NOTES
Jocelyn Burt,    Your echocardiogram is normal.  Your ejection fraction is 55-60%. This suggests your heart's pumping strength is normal and there is no concern for heart failure at this time. All your valves are working appropriately. The left upper chamber of the heart is just slightly above the upper limits of normal -- I am not worried about this. Does not suggest any issues with the heart. Your echocardiogram is overall a very reassuring study. Hopefully you are feeling better since starting the metoprolol.     Thanks,  Jesus Lechuga, FNP-BC

## 2021-06-22 RX ORDER — BUPROPION HYDROCHLORIDE 300 MG/1
300 TABLET ORAL
Qty: 90 TABLET | Refills: 0 | Status: SHIPPED | OUTPATIENT
Start: 2021-06-22 | End: 2021-06-28 | Stop reason: SDUPTHER

## 2021-06-22 NOTE — TELEPHONE ENCOUNTER
PCP: Ruddy Perez MD    Last appt: 3/5/2021  Future Appointments   Date Time Provider Bret De Anda   9/8/2021  8:30 AM Ruddy Perez MD [facility] BS AMB   2/22/2022 10:00 AM [facility]     New RX AUTH     Requested Prescriptions     Pending Prescriptions Disp Refills    buPROPion XL (WELLBUTRIN XL) 300 mg XL tablet 90 Tablet 0     Sig: Take 1 Tablet by mouth every morning.

## 2021-06-28 NOTE — TELEPHONE ENCOUNTER
Future Appointments:  Future Appointments   Date Time Provider Bret Lundyi   9/8/2021  8:30 AM Veronica Ziegler MD [facility] BS AMB   2/22/2022 10:00 AM Pavel Cole NP [facility] BS AMB        Last Appointment With Me:  3/5/2021     Requested Prescriptions     Pending Prescriptions Disp Refills    buPROPion XL (WELLBUTRIN XL) 300 mg XL tablet 90 Tablet 0     Sig: Take 1 Tablet by mouth every morning. none

## 2021-06-29 RX ORDER — BUPROPION HYDROCHLORIDE 300 MG/1
300 TABLET ORAL
Qty: 90 TABLET | Refills: 0 | Status: SHIPPED | OUTPATIENT
Start: 2021-06-29 | End: 2021-12-13

## 2021-07-01 RX ORDER — BUPROPION HYDROCHLORIDE 150 MG/1
TABLET ORAL
Qty: 180 TABLET | Refills: 1 | Status: SHIPPED | OUTPATIENT
Start: 2021-07-01 | End: 2021-07-23

## 2021-07-01 NOTE — TELEPHONE ENCOUNTER
Future Appointments:  Future Appointments   Date Time Provider Bret Neetu   9/8/2021  8:30 AM Marilyn Armendariz MD [facility] BS AMB   2/22/2022 10:00 AM Zarina Cole NP [facility] BS AMB        Last Appointment With Me:  3/5/2021     Requested Prescriptions     Pending Prescriptions Disp Refills    buPROPion XL (WELLBUTRIN XL) 150 mg tablet 180 Tablet 1     Sig: TAKE 2 TABLETS BY MOUTH EVERY MORNING

## 2021-07-10 RX ORDER — ERGOCALCIFEROL 1.25 MG/1
CAPSULE ORAL
Qty: 16 CAPSULE | Refills: 0 | Status: SHIPPED | OUTPATIENT
Start: 2021-07-10 | End: 2021-11-30

## 2021-07-23 RX ORDER — BUPROPION HYDROCHLORIDE 150 MG/1
TABLET ORAL
Qty: 60 TABLET | Refills: 1 | Status: SHIPPED | OUTPATIENT
Start: 2021-07-23 | End: 2021-12-13

## 2021-08-31 LAB — SARS-COV-2, NAA: NOT DETECTED

## 2021-09-15 ENCOUNTER — TELEPHONE (OUTPATIENT)
Dept: CARDIOLOGY CLINIC | Age: 41
End: 2021-09-15

## 2021-09-15 NOTE — TELEPHONE ENCOUNTER
Spoke with patient. Verified with two patient identifiers. Advised pt per [employer] NP, Without really knowing any vitals, how long it's been going on for I can't say what is causing it.  I am happy to see her as soon as we have an appt available to figure out what is going on.  If she feels very bad, can go back to the ER, or see PCP if she can see her before me. If she can get me some BP readings and HRs before the visit, I have something to go on. Shola  all I can tell her is to hold BB until she comes in to see us. Pt states that it would be a waste to go back to ER, pt also states that she does not have a PCP right now and she states that she will go buy a BP cuff and keep track of her BP and hold the BB until seen and I advised her that we will call to schedule appt. Patient verbalized understanding.

## 2021-09-15 NOTE — TELEPHONE ENCOUNTER
Pt is having dizzy spells for about 2 weeks. . Was seen in ER and was told to check with Cardiologist; it might be her medications. Please advise.     [phone]    Thanks,  Ally

## 2021-09-15 NOTE — TELEPHONE ENCOUNTER
Bethany Please Advise     Pt has roscoe having dizzy spells for about 2 weeks. . Was seen in ER and was told to check with Cardiologist; that it might be her medications. Please Advise.

## 2021-09-15 NOTE — TELEPHONE ENCOUNTER
Jcarlos Velasquez     Can you call pt and schedule an appt she is having dizziness May see NP Rachael Mathias if sooner appt available.  Thank You Acute hypoxic respiratory failure, likely recurrent aspiration pneumonitis, worsening b/l opacities on CT, c/w IV Meropenem, on HF oxygen, continue Lasix to keep on dry side, pulm following, recs appreciated   Metabolic encephalopathy with seizure activity: likely d/t infection, mental status wax/wanes, VEEG negative for seizures and will c/w Keppra, stable SDH - no intervention by NSx   Dysphagia, s/p PEG 11/29, c/w tube feeds, endo consult to assist with diabetes management   DLBCL: s/p 2 cycles of R-CD with interval improvement in lymphadenopathy on scans from 11/14, CT shows response to chemo with reduced lymphadenopathy and spleen size, Heme is considering inpt chemo if his condition improves   Mild hypercalcemia: likely d/t lymphoma, Ca improved . Acute hypoxic respiratory failure, likely recurrent aspiration pneumonitis, worsening b/l opacities on CT, c/w IV Meropenem, leukocytosis trending up, remains afebrile, will continue to monitor for now, remains on HF oxygen, continue Lasix, pulm following, recs appreciated   Metabolic encephalopathy with seizure activity: likely d/t infection, mental status wax/wanes, VEEG negative for seizures and will c/w Keppra, stable SDH - no intervention by NSx   Dysphagia, s/p PEG 11/29, c/w tube feeds, endo consult to assist with diabetes management   DLBCL: s/p 2 cycles of R-CD with interval improvement in lymphadenopathy on scans from 11/14, CT shows response to chemo with reduced lymphadenopathy and spleen size, Heme is considering inpt chemo if his condition improves   Mild hypercalcemia: likely d/t lymphoma, Ca improved .

## 2021-09-15 NOTE — TELEPHONE ENCOUNTER
[employer]     Pt states that she is struggling she states that she feels dizzy when she is sitting down. She states that not only does she feel dizzy she feels like she is going to vomit.

## 2021-09-15 NOTE — TELEPHONE ENCOUNTER
Spoke with patient. Verified with two patient identifiers. Advised pt per Anthony Pena NP, she should come in to be seen. Advised I would have  call and schedule appt. Patient verbalized understanding. Pt states that she is struggling she states that she feels dizzy when she is sitting down. She states that not only does she feel dizzy she feels like she is going to vomit. Advised that I would let NP know but also get  to call. Patient verbalized understanding.

## 2021-09-15 NOTE — TELEPHONE ENCOUNTER
Jing Angry     Pt states that it would be a waste to go back to ER, pt also states that she does not have a PCP right now and she states that she will go buy a BP cuff and keep track of her BP and hold the BB until seen. Called [facility] they are faxing over records. Nurse states that the visit has not been finalized yet by Doctor but she will send over tests that were done.

## 2021-11-17 NOTE — PROGRESS NOTES
Quincy Conception, FNP-BC    Subjective/HPI:     Carmel Lopez is a 35 y.o. female is here for routine f/u. She has a PMHx of PCOS and migraines. Last visit, obtained exercise stress test, echo and event monitor for complaints of chest pains with palpitations. Pt did endorse significant anxiety due to life stressors and hardships during the visit, was recommended to see psych for further management. Event monitor did show tachycardia with PACs and so was started on BB therapy for symptom management. Echo and stress were unremarkable. Pt then started experiencing dizziness. Was seen at [facility] and was told symptoms were related to her medication. Recommended to follow up with us. She stopped taking her Wellbutrin and Toprol and symptoms resolved. Feels much better from dizziness standpoint. 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 Did start to see a psychiatrist to help deal with stressors. Has to find another oncologist as her previous oncologist moved. Wants to know whom she can see for immune system issues. Current Outpatient Medications on File Prior to Visit   Medication Sig Dispense Refill    EPINEPHrine (EpiPen) 0.3 mg/0.3 mL injection 0.3 mg by IntraMUSCular route once as needed for Allergic Response.  famotidine (PEPCID) 20 mg tablet Take 20 mg by mouth two (2) times a day.  Cetirizine (ZyrTEC) 10 mg cap Take 10 mg by mouth daily.  fexofenadine (ALLEGRA) 180 mg tablet Take 180 mg by mouth daily.  ergocalciferol (ERGOCALCIFEROL) 1,250 mcg (50,000 unit) capsule TAKE 1 CAPSULE BY MOUTH EVERY 7 DAYS. 4 Capsule 0    naproxen (NAPROSYN) 500 mg tablet Take 1 Tablet by mouth two (2) times daily (with meals).  (Patient taking differently: Take 500 mg by mouth as needed.) 20 Tablet 0    topiramate (TOPAMAX) 50 mg tablet TAKE 1 TABLET DAILY 90 Tablet 1    cyclobenzaprine (FLEXERIL) 10 mg tablet TAKE 1 TABLET BY MOUTH EVERY NIGHT AT BEDTIME AS NEEDED FOR SPASM      albuterol (PROAIR HFA) 90 mcg/actuation inhaler ProAir HFA 90 mcg/actuation aerosol inhaler 1 Inhaler 0    metFORMIN (GLUMETZA ER) 500 mg TG24 24 hour tablet Take 500 mg by mouth daily.  IBUPROFEN PO Take 800 mg by mouth as needed.  acetaminophen (TYLENOL EXTRA STRENGTH) 500 mg tablet Take  by mouth every six (6) hours as needed for Pain.      L.acid/L.casei/B.bif/B.donnie/FOS (PROBIOTIC BLEND PO) Take  by mouth. From [facility]      [DISCONTINUED] methylPREDNISolone (Medrol, Keshav,) 4 mg tablet Take as prescribed 1 Dose Pack 0    [DISCONTINUED] buPROPion XL (WELLBUTRIN XL) 150 mg tablet TAKE 2 TABLETS BY MOUTH EVERY MORNING (Patient not taking: Reported on 12/13/2021) 60 Tablet 1    metoprolol succinate (TOPROL-XL) 25 mg XL tablet TAKE 1 TABLET BY MOUTH DAILY (Patient not taking: Reported on 12/13/2021) 90 Tablet 3    [DISCONTINUED] buPROPion XL (WELLBUTRIN XL) 300 mg XL tablet Take 1 Tablet by mouth every morning. 90 Tablet 0    [DISCONTINUED] aspirin 81 mg chewable tablet Take 81 mg by mouth daily.  [DISCONTINUED] norethindrone (MICRONOR) 0.35 mg tab Take 1 Tab by mouth daily. (Patient not taking: Reported on 12/13/2021)       No current facility-administered medications on file prior to visit. Review of Symptoms:    Review of Systems   Constitutional: Negative for chills, fever and weight loss. HENT: Negative for nosebleeds. Eyes: Negative for blurred vision and double vision. Respiratory: Negative for cough, shortness of breath and wheezing. Cardiovascular: Negative for chest pain, palpitations, orthopnea, leg swelling and PND. Skin: Negative for rash. Neurological: Negative for dizziness and loss of consciousness. Physical Exam:      General: Well developed, in no acute distress, cooperative and alert  Heart:  reg rate and rhythm; normal S1/S2; no murmurs, no gallops or rubs.    Respiratory: Clear bilaterally x 4, no wheezing or rales  Extremities:  Normal cap refill, no cyanosis, atraumatic. No edema. Vascular: 2+ pulses symmetric in all extremities    Vitals:    12/13/21 1531 12/13/21 1558 12/13/21 1559   BP: 98/78 108/84  Comment: dizziness 104/84  Comment: some dizziness   BP 1 Location: Left upper arm Left upper arm Left upper arm   BP Patient Position: Lying Sitting Standing   BP Cuff Size: Large adult Large adult Large adult   Pulse: 83 89 96   Resp: 15     Height: 5' 7\" (1.702 m)     Weight: 177 lb 3.2 oz (80.4 kg)     SpO2: 98% 98% 97%       ECG done today shows sinus rhythm     Assessment:       ICD-10-CM ICD-9-CM    1. Dizziness  R42 780.4 AMB POC EKG ROUTINE W/ 12 LEADS, INTER & REP   2. Chest pain, unspecified type  R07.9 786.50 AMB POC EKG ROUTINE W/ 12 LEADS, INTER & REP   3. Palpitation  R00.2 785.1 AMB POC EKG ROUTINE W/ 12 LEADS, INTER & REP        Plan:     1. Dizziness  Orthostatics today negative  Symptoms resolved since stopping Wellbutrin    2. Chest pain, unspecified type  Exercise stress test done 6/2021 without evidence of ischemia; completed 10.1 METs on Peter Protocol  Symptoms resolved    3. Palpitation  30-day event monitor done 5/2021 with PACs and sinus tachycardia associated with symptoms  Echo done 6/2021 with preserved LVEF 55-60%, no significant valvular pathology  Symptoms resolved, may continue to hold Toprol  Consider resuming BB if symptoms recur    Discussed role of stress and overactive immune system. Keep fu with oncology, psych and PCP. Could consider seeing rheumatologist.    F/u with Dr. Earle Mckenna in 1 year    Mitzi Russell NP      On this date 12/13/2021 I have spent 45 minutes reviewing previous notes, test results and face to face with the patient discussing the diagnosis and importance of compliance with the treatment plan as well as documenting on the day of the visit.

## 2021-11-25 ENCOUNTER — HOSPITAL ENCOUNTER (EMERGENCY)
Age: 41
Discharge: HOME | End: 2021-11-26
Attending: EMERGENCY MEDICINE
Payer: COMMERCIAL

## 2021-11-25 DIAGNOSIS — R22.1 THROAT SWELLING: ICD-10-CM

## 2021-11-25 DIAGNOSIS — Z91.018 FOOD ALLERGY: ICD-10-CM

## 2021-11-25 DIAGNOSIS — R51.9 SINUS HEADACHE: ICD-10-CM

## 2021-11-25 DIAGNOSIS — R22.0 TONGUE SWELLING: ICD-10-CM

## 2021-11-25 DIAGNOSIS — T78.40XA ALLERGIC REACTION, INITIAL ENCOUNTER: Primary | ICD-10-CM

## 2021-11-25 PROCEDURE — 96374 THER/PROPH/DIAG INJ IV PUSH: CPT

## 2021-11-25 PROCEDURE — 74011000250 HC RX REV CODE- 250: Performed by: EMERGENCY MEDICINE

## 2021-11-25 PROCEDURE — 96375 TX/PRO/DX INJ NEW DRUG ADDON: CPT

## 2021-11-25 PROCEDURE — 74011250637 HC RX REV CODE- 250/637: Performed by: EMERGENCY MEDICINE

## 2021-11-25 PROCEDURE — 74011250636 HC RX REV CODE- 250/636: Performed by: EMERGENCY MEDICINE

## 2021-11-25 PROCEDURE — 99284 EMERGENCY DEPT VISIT MOD MDM: CPT

## 2021-11-25 RX ORDER — DIPHENHYDRAMINE HCL 25 MG
50 CAPSULE ORAL
Qty: 20 CAPSULE | Refills: 0 | Status: SHIPPED | OUTPATIENT
Start: 2021-11-25 | End: 2021-11-26 | Stop reason: SDUPTHER

## 2021-11-25 RX ORDER — METHYLPREDNISOLONE 4 MG/1
TABLET ORAL
Qty: 1 DOSE PACK | Refills: 0 | Status: SHIPPED | OUTPATIENT
Start: 2021-11-25 | End: 2021-11-26 | Stop reason: SDUPTHER

## 2021-11-25 RX ORDER — DIPHENHYDRAMINE HYDROCHLORIDE 50 MG/ML
50 INJECTION, SOLUTION INTRAMUSCULAR; INTRAVENOUS
Status: COMPLETED | OUTPATIENT
Start: 2021-11-25 | End: 2021-11-25

## 2021-11-25 RX ORDER — EPINEPHRINE 0.3 MG/.3ML
0.3 INJECTION SUBCUTANEOUS
Qty: 2 EACH | Refills: 0 | Status: SHIPPED | OUTPATIENT
Start: 2021-11-25 | End: 2021-11-25

## 2021-11-25 RX ORDER — FAMOTIDINE 20 MG/1
20 TABLET, FILM COATED ORAL 2 TIMES DAILY
Qty: 20 TABLET | Refills: 0 | Status: SHIPPED | OUTPATIENT
Start: 2021-11-25 | End: 2021-11-26 | Stop reason: SDUPTHER

## 2021-11-25 RX ADMIN — FAMOTIDINE 20 MG: 10 INJECTION, SOLUTION INTRAVENOUS at 22:42

## 2021-11-25 RX ADMIN — DIPHENHYDRAMINE HYDROCHLORIDE 50 MG: 50 INJECTION, SOLUTION INTRAMUSCULAR; INTRAVENOUS at 22:38

## 2021-11-25 RX ADMIN — METHYLPREDNISOLONE SODIUM SUCCINATE 125 MG: 125 INJECTION, POWDER, FOR SOLUTION INTRAMUSCULAR; INTRAVENOUS at 22:35

## 2021-11-25 RX ADMIN — LIDOCAINE HYDROCHLORIDE 40 ML: 20 SOLUTION OROPHARYNGEAL at 23:39

## 2021-11-25 NOTE — LETTER
[address]  [facility] EMERGENCY DEPT  [address]  [place] [id number]  [phone]    Work/School Note    Date: 11/25/2021    To Whom It May concern:    Carmel Snow was seen and treated today in the emergency room by the following provider(s):  Attending Provider: Thu Mckeon MD.      Chadd Coffman may return to work on 11/29/21.     Sincerely,          Scott Sheriff MD

## 2021-11-26 VITALS
TEMPERATURE: 98.2 F | RESPIRATION RATE: 18 BRPM | OXYGEN SATURATION: 99 % | DIASTOLIC BLOOD PRESSURE: 91 MMHG | WEIGHT: 183.42 LBS | SYSTOLIC BLOOD PRESSURE: 140 MMHG | HEART RATE: 87 BPM | BODY MASS INDEX: 28.79 KG/M2 | HEIGHT: 67 IN

## 2021-11-26 RX ORDER — METHYLPREDNISOLONE 4 MG/1
TABLET ORAL
Qty: 1 DOSE PACK | Refills: 0 | Status: SHIPPED | OUTPATIENT
Start: 2021-11-26 | End: 2021-12-13 | Stop reason: ALTCHOICE

## 2021-11-26 RX ORDER — NAPROXEN 500 MG/1
500 TABLET ORAL 2 TIMES DAILY WITH MEALS
Qty: 20 TABLET | Refills: 0 | Status: SHIPPED | OUTPATIENT
Start: 2021-11-26 | End: 2021-11-26 | Stop reason: SDUPTHER

## 2021-11-26 RX ORDER — DIPHENHYDRAMINE HCL 25 MG
50 CAPSULE ORAL
Qty: 20 CAPSULE | Refills: 0 | Status: SHIPPED | OUTPATIENT
Start: 2021-11-26 | End: 2021-12-06

## 2021-11-26 RX ORDER — NAPROXEN 500 MG/1
500 TABLET ORAL 2 TIMES DAILY WITH MEALS
Qty: 20 TABLET | Refills: 0 | Status: SHIPPED | OUTPATIENT
Start: 2021-11-26 | End: 2022-02-22 | Stop reason: SDUPTHER

## 2021-11-26 RX ORDER — EPINEPHRINE 0.3 MG/.3ML
0.3 INJECTION SUBCUTANEOUS
Qty: 2 EACH | Refills: 0 | Status: SHIPPED | OUTPATIENT
Start: 2021-11-26 | End: 2021-11-26

## 2021-11-26 RX ORDER — FAMOTIDINE 20 MG/1
20 TABLET, FILM COATED ORAL 2 TIMES DAILY
Qty: 20 TABLET | Refills: 0 | Status: SHIPPED | OUTPATIENT
Start: 2021-11-26 | End: 2021-12-06

## 2021-11-26 NOTE — ED NOTES
I have reviewed discharge instructions with the patient. The patient verbalized understanding.  rx's sent to [facility] [address] not the [facility] mail pharmacy listed on dc papers per Dr. Amaya Alarcon he will call them in; pt in nad, has a friend that will be driving her home, states understanding not to drive home tonight;

## 2021-11-26 NOTE — ED PROVIDER NOTES
EMERGENCY DEPARTMENT HISTORY AND PHYSICAL EXAM      Please note that this dictation was completed with the assistance of \"Dragon\", the computer voice recognition software. Quite often unanticipated grammatical, syntax, homophones, and other interpretive errors are inadvertently transcribed by the computer software. Please disregard these errors and any errors that have escaped final proofreading. Thank you. Patient: Keesha Humphreys  DOS: 21  : 1980  MRN: [id number]  History of Presenting Illness     Chief Complaint   Patient presents with    Allergic Reaction     pt presents w/ c/o throat clearing increased sercretions, and tongue swelling x2 hours     History Provided By: Patient/family/EMS (if applicable)    HPI: Carmel Cardoza, 35 y.o. female with past medical history as documented below presents to the ED with c/o of acute onset of two hours of tongue swelling and throat tickling sensation. Pt states sx's started about 2 hours ago. She thinks she may be allergic to pork which she ate right before this happening. She did take 25 mg PO Benadryl PTA. Pt denies any other exacerbating or ameliorating factors. Additionally, pt specifically denies any recent fever, chills, headache, nausea, vomiting, abdominal pain, CP, SOB, lightheadedness, dizziness, numbness, weakness, lower extremity swelling, heart palpitations, urinary sxs, diarrhea, constipation, melena, hematochezia, cough, or congestion. There are no other complaints, changes or physical findings pertinent to the HPI at this time.     PCP: John Junior MD  Past History   Past Medical History:  Past Medical History:   Diagnosis Date    Autoimmune disease [facility])     fibromyalgia    Bandemia 10/2/2020    Dr Addison Ty, also anemia [facility]    Depression     and anxiety    Fibromyalgia     GERD (gastroesophageal reflux disease)     Hx of mammogram 2021       Past Surgical History:  Past Surgical History:   Procedure Laterality Date  HX COLONOSCOPY  01/19/2021    HX GYN      fallopian tube removed after tubal pregnancy    HX GYN      hysteroscopy/laporoscopy to remove schar tissue    HX OTHER SURGICAL      oral surgery for wisdom teeth, root canal       Family History:   Family history reviewed and was non-contributory, unless specified below:  Family History   Problem Relation Age of Onset    Hypertension Mother     Psychiatric Disorder Mother         depression/anxiety    Diabetes Maternal Grandfather     Diabetes Paternal Grandmother     Hypertension Paternal Grandmother     Stroke Paternal Grandmother        Social History:  Social History     Tobacco Use    Smoking status: Never Smoker    Smokeless tobacco: Never Used   Vaping Use    Vaping Use: Not on file   Substance Use Topics    Alcohol use: No    Drug use: No       Allergies: Allergies   Allergen Reactions    Advil Pm [Ibuprofen-Diphenhydramine Cit] Hives     Can take benadryl without issue    Percocet [Oxycodone-Acetaminophen] Itching    Tobramycin Swelling     Eye drop       Current Medications:  No current facility-administered medications on file prior to encounter. Current Outpatient Medications on File Prior to Encounter   Medication Sig Dispense Refill    buPROPion XL (WELLBUTRIN XL) 150 mg tablet TAKE 2 TABLETS BY MOUTH EVERY MORNING 60 Tablet 1    ergocalciferol (ERGOCALCIFEROL) 1,250 mcg (50,000 unit) capsule TAKE 1 CAPSULE BY MOUTH EVERY 7 DAYS. 16 Capsule 0    metoprolol succinate (TOPROL-XL) 25 mg XL tablet TAKE 1 TABLET BY MOUTH DAILY 90 Tablet 3    buPROPion XL (WELLBUTRIN XL) 300 mg XL tablet Take 1 Tablet by mouth every morning. 90 Tablet 0    topiramate (TOPAMAX) 50 mg tablet TAKE 1 TABLET DAILY 90 Tablet 1    aspirin 81 mg chewable tablet Take 81 mg by mouth daily.  norethindrone (MICRONOR) 0.35 mg tab Take 1 Tab by mouth daily.       cyclobenzaprine (FLEXERIL) 10 mg tablet TAKE 1 TABLET BY MOUTH EVERY NIGHT AT BEDTIME AS NEEDED FOR SPASM      albuterol (PROAIR HFA) 90 mcg/actuation inhaler ProAir HFA 90 mcg/actuation aerosol inhaler 1 Inhaler 0    metFORMIN (GLUMETZA ER) 500 mg TG24 24 hour tablet Take 500 mg by mouth daily.  IBUPROFEN PO Take 800 mg by mouth as needed.  acetaminophen (TYLENOL EXTRA STRENGTH) 500 mg tablet Take  by mouth every six (6) hours as needed for Pain.      L.acid/L.casei/B.bif/B.donnie/FOS (PROBIOTIC BLEND PO) Take  by mouth. From [facility]       Review of Systems   A complete ROS was reviewed by me today and all other systems negative, unless otherwise specified below:  Review of Systems   Constitutional: Negative. Negative for chills and fever. HENT: Positive for facial swelling and trouble swallowing. Negative for congestion and sore throat. Eyes: Negative. Respiratory: Negative. Negative for cough, chest tightness, shortness of breath and wheezing. Cardiovascular: Negative. Negative for chest pain, palpitations and leg swelling. Gastrointestinal: Negative. Negative for abdominal distention, abdominal pain, blood in stool, constipation, diarrhea, nausea and vomiting. Endocrine: Negative. Genitourinary: Negative. Negative for difficulty urinating, dysuria, flank pain, frequency, hematuria and urgency. Musculoskeletal: Negative. Negative for back pain and neck stiffness. Skin: Negative. Negative for rash. Allergic/Immunologic: Positive for food allergies. Neurological: Negative. Negative for dizziness, syncope, weakness, light-headedness, numbness and headaches. Hematological: Negative. Psychiatric/Behavioral: Negative. Negative for confusion and self-injury. Physical Exam   Physical Exam  Vitals and nursing note reviewed. Constitutional:       General: She is not in acute distress. Appearance: She is well-developed. She is not diaphoretic. HENT:      Head: Normocephalic and atraumatic.       Comments: Min ventral tongue edema, posterior pharynx clear, no swelling or erythema, uvula midline, floor of mouth soft, no trismus     Mouth/Throat:      Pharynx: No oropharyngeal exudate. Eyes:      Conjunctiva/sclera: Conjunctivae normal.      Pupils: Pupils are equal, round, and reactive to light. Cardiovascular:      Rate and Rhythm: Normal rate and regular rhythm. Heart sounds: Normal heart sounds. No murmur heard. No friction rub. No gallop. Pulmonary:      Effort: Pulmonary effort is normal. No respiratory distress. Breath sounds: Normal breath sounds. No wheezing or rales. Chest:      Chest wall: No tenderness. Abdominal:      General: Bowel sounds are normal. There is no distension. Palpations: Abdomen is soft. There is no mass. Tenderness: There is no abdominal tenderness. There is no guarding or rebound. Musculoskeletal:         General: No tenderness or deformity. Normal range of motion. Cervical back: Normal range of motion. Skin:     General: Skin is warm. Findings: No rash. Neurological:      Mental Status: She is alert and oriented to person, place, and time. Cranial Nerves: No cranial nerve deficit. Motor: No abnormal muscle tone. Coordination: Coordination normal.      Deep Tendon Reflexes: Reflexes normal.       Diagnostic Study Results     Laboratory Data  I have personally reviewed and interpreted all available laboratory results. No results found for this or any previous visit (from the past 24 hour(s)). Radiologic Studies   I have personally reviewed and interpreted all available imaging studies and agree with radiology interpretation. No orders to display     CT Results  (Last 48 hours)    None        CXR Results  (Last 48 hours)    None        Medical Decision Making   I am the first and primary ED physician for this patient's ED visit today.     I reviewed our electronic medical record system for any past medical records that may contribute to the patient's current condition, including their past medical history, surgical history, social and family history. This also includes their most recent ED visits, previous hospitalizations and prior diagnostic data. I have reviewed and summarized the most pertinent findings in my HPI and MDM. Vital Signs Reviewed:  Patient Vitals for the past 24 hrs:   Temp Pulse Resp BP SpO2   11/26/21 0018 -- -- -- -- 99 %   11/26/21 0001 -- -- -- -- 100 %   11/25/21 2330 -- -- -- -- 100 %   11/25/21 2326 -- -- -- (!) 140/91 100 %   11/25/21 2241 -- -- -- (!) 138/103 100 %   11/25/21 2237 -- -- -- -- 100 %   11/25/21 2158 98.2 °F (36.8 °C) 87 18 (!) 151/97 100 %     Pulse Oximetry Analysis: 100% on RA    Cardiac Monitor:   Rate: 87 bpm  The cardiac monitor revealed the following rhythm as interpreted by me: Normal Sinus Rhythm  Cardiac monitoring was ordered to monitor patient for signs of cardiac dysrhythmia, which they are at risk for based on their history and/or risk for cardiovascular disease and/or metabolic abnormalities. Records Reviewed: Nursing Notes, Old Medical Records, Previous electrocardiograms, Previous Radiology Studies and Previous Laboratory Studies, EMS reports    Provider Notes (Medical Decision Making):   Patient presents with allergic reaction, min anterior tongue edema, c/o globus sensation. Stable vitals and without significant airway compromise. DDx: allergic reaction, ACEi side effect, hereditary angioedema, contact dermatitis, idiopathic. Will try steroids, benadryl, pepcid and continue to monitor here for exacerbation. May need intervention for airway protection, TXA or FFP. Reassess. ED Course:   Initial assessment performed. I discussed presenting problems and concerns, and my formulated plan for today's visit with the patient and any available family members. I have encouraged them to ask questions as they arise throughout the visit.    Social History     Tobacco Use    Smoking status: Never Smoker    Smokeless tobacco: Never Used   Vaping Use    Vaping Use: Not on file   Substance Use Topics    Alcohol use: No    Drug use: No       ED Orders Placed:  Orders Placed This Encounter    methylPREDNISolone (PF) (Solu-MEDROL) injection 125 mg    DISCONTD: famotidine (PF) (PEPCID) 20 mg in 0.9% sodium chloride 10 mL injection    diphenhydrAMINE (BENADRYL) injection 50 mg    maalox/viscous lidocaine (COV GI COCKTAIL)    EPINEPHrine (EPIPEN) 0.3 mg/0.3 mL injection    DISCONTD: famotidine (Pepcid) 20 mg tablet    DISCONTD: methylPREDNISolone (Medrol, Keshav,) 4 mg tablet    DISCONTD: diphenhydrAMINE (BenadryL) 25 mg capsule    DISCONTD: naproxen (NAPROSYN) 500 mg tablet    diphenhydrAMINE (BenadryL) 25 mg capsule    famotidine (Pepcid) 20 mg tablet    methylPREDNISolone (Medrol, Keshav,) 4 mg tablet    naproxen (NAPROSYN) 500 mg tablet    EPINEPHrine (EPIPEN) 0.3 mg/0.3 mL injection       ED Medications Administered During ED Course:  Medications   methylPREDNISolone (PF) (Solu-MEDROL) injection 125 mg (125 mg IntraVENous Given 11/25/21 2235)   diphenhydrAMINE (BENADRYL) injection 50 mg (50 mg IntraVENous Given 11/25/21 2238)   maalox/viscous lidocaine (COV GI COCKTAIL) (40 mL Oral Given 11/25/21 2339)        Progress Note:  I have just re-evaluated the patient. Patient reports improvement of sx's. I have reviewed Her vital signs and determined there is currently no worsening in their condition or physical exam. Results have been reviewed with them and their questions have been answered. We will continue to review further results as they come available. Progress Note:  Pt reassessed and symptoms noted to have improved significantly after ED treatment. Pt is clinically stable for discharge. Carmel Martínez's labs and imaging have been reviewed with her and available family.  She verbally conveys understanding and agreement of the signs, symptoms, diagnosis, treatment and prognosis and additionally agrees to follow up as recommended with Dr. Autumn Oconnell MD and/or specialist as instructed. She agrees with the care plan we have created and conveys that all of her questions have been answered. Additionally, I have put together a packet of discharge instructions for her that include: 1) educational information regarding their diagnosis, 2) how to care for their diagnosis at home, as well a 3) list of reasons why they would want to return to the ED prior to their follow-up appointment should the patient's condition change or symptoms worsen. I have answered all questions to the patient's satisfaction. Strict return precautions given. She conveyed understanding and agreement with care plan. Vital signs stable for discharge. Disposition:  DISCHARGE  The pt is ready for discharge. The pt's signs, symptoms, diagnosis, and discharge instructions have been discussed and pt has conveyed their understanding. The pt is to follow up as recommended or return to ER should their symptoms worsen. Plan has been discussed and pt is in agreement. Plan:  1. Return precautions as discussed with patient and available family/caregiver. 2.   Discharge Medication List as of 11/26/2021 12:20 AM      CONTINUE these medications which have CHANGED    Details   diphenhydrAMINE (BenadryL) 25 mg capsule Take 2 Capsules by mouth every six (6) hours as needed for Allergies for up to 10 days. , Normal, Disp-20 Capsule, R-0      famotidine (Pepcid) 20 mg tablet Take 1 Tablet by mouth two (2) times a day for 10 days. , Normal, Disp-20 Tablet, R-0      methylPREDNISolone (Medrol, Keshav,) 4 mg tablet Take as prescribed, Normal, Disp-1 Dose Pack, R-0      naproxen (NAPROSYN) 500 mg tablet Take 1 Tablet by mouth two (2) times daily (with meals). , Normal, Disp-20 Tablet, R-0      EPINEPHrine (EPIPEN) 0.3 mg/0.3 mL injection 0.3 mL by IntraMUSCular route once as needed for Allergic Response for up to 1 dose., Normal, Disp-2 Each, R-0         CONTINUE these medications which have NOT CHANGED    Details   !! buPROPion XL (WELLBUTRIN XL) 150 mg tablet TAKE 2 TABLETS BY MOUTH EVERY MORNING, Normal, Disp-60 Tablet, R-1      ergocalciferol (ERGOCALCIFEROL) 1,250 mcg (50,000 unit) capsule TAKE 1 CAPSULE BY MOUTH EVERY 7 DAYS., Normal, Disp-16 Capsule, R-0      metoprolol succinate (TOPROL-XL) 25 mg XL tablet TAKE 1 TABLET BY MOUTH DAILY, Normal, Disp-90 Tablet, R-3      !! buPROPion XL (WELLBUTRIN XL) 300 mg XL tablet Take 1 Tablet by mouth every morning., Normal, Disp-90 Tablet, R-0      topiramate (TOPAMAX) 50 mg tablet TAKE 1 TABLET DAILY, Normal, Disp-90 Tablet, R-1      aspirin 81 mg chewable tablet Take 81 mg by mouth daily. , Historical Med      norethindrone (MICRONOR) 0.35 mg tab Take 1 Tab by mouth daily. , Historical Med      cyclobenzaprine (FLEXERIL) 10 mg tablet TAKE 1 TABLET BY MOUTH EVERY NIGHT AT BEDTIME AS NEEDED FOR SPASM, Historical Med      albuterol (PROAIR HFA) 90 mcg/actuation inhaler ProAir HFA 90 mcg/actuation aerosol inhaler, Normal, Disp-1 Inhaler, R-0      metFORMIN (GLUMETZA ER) 500 mg TG24 24 hour tablet Take 500 mg by mouth daily. , Historical Med      IBUPROFEN PO Take 800 mg by mouth as needed., Historical Med      acetaminophen (TYLENOL EXTRA STRENGTH) 500 mg tablet Take  by mouth every six (6) hours as needed for Pain., Historical Med      L.acid/L.casei/B.bif/B.donnie/FOS (PROBIOTIC BLEND PO) Take  by mouth. From [facility], Historical Med       !! - Potential duplicate medications found. Please discuss with provider.         3.   Follow-up Information     Follow up With Specialties Details Why Contact Info    [facility] Emergency Medicine  As needed, If symptoms worsen [address] [facility] [address]    Fernando Nyhan, MD Internal Medicine  As needed, If symptoms worsen [address] [facility]  [phone]      [facility]   As needed, If symptoms worsen [address]  [address]        Instructed to return to ED if worse  Diagnosis   Clinical Impression:  1. Allergic reaction, initial encounter    2. Throat swelling    3. Tongue swelling    4. Sinus headache    5. Food allergy      Attestation:  Marie Srivastava MD, am the attending of record for this patient. I personally performed the services described in this documentation on this date, 11/25/2021 for patient, Fabricio Mcgrath. I have reviewed the chart and verified that the record is accurate and complete.

## 2021-11-26 NOTE — DISCHARGE INSTRUCTIONS
Thank You! It was a pleasure taking care of you in our Emergency Department today. We know that when you come to [facility], you are entrusting us with your health, comfort, and safety. Our physicians and nurses honor that trust, and truly appreciate the opportunity to care for you and your loved ones. We also value your feedback. If you receive a survey about your Emergency Department experience today, please fill it out. We care about our patients' feedback, and we listen to what you have to say. Thank you. Dr. Kane López M.D.      ________________________________________________________________________  I have included a copy of your lab results and/or radiologic studies from today's visit so you can have them easily available at your follow-up visit. We hope you feel better and please do not hesitate to contact the ED if you have any questions at all! No results found for this or any previous visit (from the past 12 hour(s)). No orders to display     CT Results  (Last 48 hours)      None          The exam and treatment you received in the Emergency Department were for an urgent problem and are not intended as complete care. It is important that you follow up with a doctor, nurse practitioner, or physician assistant for ongoing care. If your symptoms become worse or you do not improve as expected and you are unable to reach your usual health care provider, you should return to the Emergency Department. We are available 24 hours a day. Please take your discharge instructions with you when you go to your follow-up appointment. If a prescription has been provided, please have it filled as soon as possible to prevent a delay in treatment. Read the entire medication instruction sheet provided to you by the pharmacy.  If you have any questions or reservations about taking the medication due to side effects or interactions with other medications, please call your primary care physician or contact the ER to speak with the charge nurse. Please make an appointment with your family doctor or the physician you were referred to for follow-up of this visit as instructed on your discharge paperwork. Return to the ER if you are unable to be seen or if you are unable to be seen in a timely manner. If you have any problem arranging the follow-up visit, contact the Emergency Department immediately.

## 2021-12-06 ENCOUNTER — TELEPHONE (OUTPATIENT)
Dept: INTERNAL MEDICINE CLINIC | Age: 41
End: 2021-12-06

## 2021-12-06 NOTE — TELEPHONE ENCOUNTER
----- Message from Mary Byrnes sent at 12/6/2021  3:45 PM EST -----  Subject: Message to Provider    QUESTIONS  Information for Provider? Patient called to get test results. the office   was called. The call was picked up and immediately hung up with no   response. Please call patient.  ---------------------------------------------------------------------------  --------------  CALL BACK INFO  What is the best way for the office to contact you? OK to leave message on   voicemail  Preferred Call Back Phone Number?  [phone]  ---------------------------------------------------------------------------  --------------  SCRIPT ANSWERS  undefined

## 2021-12-07 NOTE — TELEPHONE ENCOUNTER
Returned patient's call in regard to concerns about care, listened to concerns, she would like to change PCP to Dr. Barbara Dawson, also would like info on Ana Been for counseling, will get with both providers and follow up.

## 2021-12-07 NOTE — TELEPHONE ENCOUNTER
Patient has been scheduled for the next available new patient appointment with Naty Richey on 1/24/2022.

## 2021-12-07 NOTE — TELEPHONE ENCOUNTER
----- Message from Cassidy Urias sent at 12/6/2021  4:07 PM EST -----  Pt would like to speak with supervisor to voice her concerns about how she feels about her care from Dr. Matteo Irving.

## 2021-12-09 NOTE — TELEPHONE ENCOUNTER
12/9 called and left message of first available appointment in Feb/ to call us back if it didn't suit her/SO

## 2021-12-13 ENCOUNTER — OFFICE VISIT (OUTPATIENT)
Dept: CARDIOLOGY CLINIC | Age: 41
End: 2021-12-13
Payer: COMMERCIAL

## 2021-12-13 ENCOUNTER — TELEPHONE (OUTPATIENT)
Dept: INTERNAL MEDICINE CLINIC | Age: 41
End: 2021-12-13

## 2021-12-13 VITALS
SYSTOLIC BLOOD PRESSURE: 104 MMHG | WEIGHT: 177.2 LBS | OXYGEN SATURATION: 97 % | DIASTOLIC BLOOD PRESSURE: 84 MMHG | BODY MASS INDEX: 27.81 KG/M2 | HEART RATE: 96 BPM | HEIGHT: 67 IN | RESPIRATION RATE: 15 BRPM

## 2021-12-13 DIAGNOSIS — R07.9 CHEST PAIN, UNSPECIFIED TYPE: ICD-10-CM

## 2021-12-13 DIAGNOSIS — R42 DIZZINESS: Primary | ICD-10-CM

## 2021-12-13 DIAGNOSIS — R00.2 PALPITATION: ICD-10-CM

## 2021-12-13 PROCEDURE — 99215 OFFICE O/P EST HI 40 MIN: CPT | Performed by: NURSE PRACTITIONER

## 2021-12-13 PROCEDURE — 93000 ELECTROCARDIOGRAM COMPLETE: CPT | Performed by: NURSE PRACTITIONER

## 2021-12-13 RX ORDER — FAMOTIDINE 20 MG/1
20 TABLET, FILM COATED ORAL 2 TIMES DAILY
COMMUNITY
End: 2022-01-27 | Stop reason: ALTCHOICE

## 2021-12-13 RX ORDER — EPINEPHRINE 0.3 MG/.3ML
0.3 INJECTION SUBCUTANEOUS
COMMUNITY

## 2021-12-13 RX ORDER — MINERAL OIL
180 ENEMA (ML) RECTAL DAILY
COMMUNITY

## 2021-12-13 RX ORDER — CETIRIZINE HYDROCHLORIDE 10 MG/1
10 CAPSULE, LIQUID FILLED ORAL DAILY
COMMUNITY

## 2021-12-13 NOTE — TELEPHONE ENCOUNTER
----- Message from Michael Samayoa sent at 12/13/2021 12:38 PM EST -----  Subject: Message to Provider    QUESTIONS  Information for Provider? patient wants to know if the office received her   short term disability paperwork. they said it was sent over on 12/7/2021. Please call the the patient [phone] Home number  ---------------------------------------------------------------------------  --------------  CALL BACK INFO  What is the best way for the office to contact you? OK to leave message on   voicemail  Preferred Call Back Phone Number? [phone]  ---------------------------------------------------------------------------  --------------  SCRIPT ANSWERS  Relationship to Patient?  Self

## 2021-12-13 NOTE — PROGRESS NOTES
1. Have you been to the ER, urgent care clinic since your last visit? Hospitalized since your last visit? Seen on 11/25/21 for allergic reaction. 2. Have you seen or consulted any other health care providers outside of the [address] [place] [address] since your last visit? Include any pap smears or colon screening. Seen by Allergic meds.       Chief Complaint   Patient presents with    Irregular Heart Beat     6 month- pt denies any cardiac symptoms- no further dizziness or heart palps- she stopped 2 anxiety meds that were causing this- notes some lite headedness at random times

## 2021-12-21 ENCOUNTER — OFFICE VISIT (OUTPATIENT)
Dept: INTERNAL MEDICINE CLINIC | Age: 41
End: 2021-12-21
Payer: COMMERCIAL

## 2021-12-21 ENCOUNTER — TELEPHONE (OUTPATIENT)
Dept: INTERNAL MEDICINE CLINIC | Age: 41
End: 2021-12-21

## 2021-12-21 VITALS
DIASTOLIC BLOOD PRESSURE: 76 MMHG | OXYGEN SATURATION: 100 % | RESPIRATION RATE: 16 BRPM | HEIGHT: 67 IN | HEART RATE: 67 BPM | BODY MASS INDEX: 27.84 KG/M2 | WEIGHT: 177.4 LBS | SYSTOLIC BLOOD PRESSURE: 115 MMHG | TEMPERATURE: 97 F

## 2021-12-21 DIAGNOSIS — F41.9 ANXIETY: ICD-10-CM

## 2021-12-21 DIAGNOSIS — M79.7 FIBROMYALGIA: ICD-10-CM

## 2021-12-21 DIAGNOSIS — T78.2XXA ANAPHYLAXIS, INITIAL ENCOUNTER: Primary | ICD-10-CM

## 2021-12-21 DIAGNOSIS — F23 BRIEF PSYCHOTIC DISORDER (HCC): ICD-10-CM

## 2021-12-21 PROCEDURE — 99215 OFFICE O/P EST HI 40 MIN: CPT | Performed by: INTERNAL MEDICINE

## 2021-12-21 RX ORDER — BUSPIRONE HYDROCHLORIDE 7.5 MG/1
7.5 TABLET ORAL 3 TIMES DAILY
Qty: 90 TABLET | Refills: 1 | Status: SHIPPED | OUTPATIENT
Start: 2021-12-21 | End: 2022-02-24 | Stop reason: SDUPTHER

## 2021-12-21 NOTE — PROGRESS NOTES
Identified pt with two pt identifiers(name and ). Reviewed record in preparation for visit and have obtained necessary documentation. Chief Complaint   Patient presents with    Hip Pain     left    Other     FMLA paperwork    Establish Care        Vitals:    21 1142   BP: 115/76   Pulse: 67   Resp: 16   Temp: 97 °F (36.1 °C)   TempSrc: Temporal   SpO2: 100%   Weight: 177 lb 6.4 oz (80.5 kg)   Height: 5' 6.5\" (1.689 m)   PainSc:  10 - Worst pain ever   PainLoc: Hip   LMP: 2021       Health Maintenance Due   Topic    Hepatitis C Screening     COVID-19 Vaccine (1)    DTaP/Tdap/Td series (1 - Tdap)    Cervical cancer screen     Flu Vaccine (1)       Coordination of Care Questionnaire:  :       Patient is accompanied by self I have received verbal consent from Humza Garcia to discuss any/all medical information while they are present in the room.

## 2021-12-21 NOTE — PROGRESS NOTES
Ms. Wilmar Mcelroy is presenting to follow up     CC:  fibromyalgia, Other (FMLA paperwork), and Establish Care  fibromyalgia     HPI:  36 yo woman with a hx of GERD, PCOS, migraines, anxiety, thrombocytosisand palpitation presenting to establish care    Needs FMLA forms filled out for acute event recently as well as intermittent leave for fibromyalgia    On Thanksgiving day patient was eating something and all of sudden mouth felt different - was eating mashed potatoes and amaya. Then progressive swelling of tongue, took benadryl and called doctor on demand and told to go ER.  She also felt lightheaded an dizzy  No hives, no vomiting  She was given steroids   The steroids caused fredis/ anger and racing thoughts saw Doctor virtually   That lasted for one week  Missed work for a week   11/24 to December 3rd December 4th returned to work       She has been on several medications for anxiety and currently not on medication  Last tried wellbutrin which caused palpitations  Per chart review has tried medications such as Kristina Hidden, effexor and and prestiq and had bad side effects  She saw a psych at [facility], she was given prazosin which caused side effect, she stopped following with them because they were out of network     She sees Dr. Sampson Ormond (onco) for thrombocytosis in [place]     She had palpitations and saw cardiology   had  Exercise stress test done 6/2021 without evidence of ischemia; completed 10.1 METs on Peter Protocol      30-day event monitor done 5/2021 with PACs and sinus tachycardia associated with symptoms  Echo done 6/2021 with preserved LVEF 55-60%, no significant valvular pathology  Symptoms resolved  Associates resolution of symptoms when she stopped wellbutrin       Pt does not follow with a rheum for her fibromyalgia  Previously, provided referral for rheumatology, but she has not followed up on this yet  Pt has tried gabapentin, lyrica, cymbalta, but had SE from all of these   pain in legs and hips back and neck and left shoulder  Chronic fatigue  somedays I cant do anything due to fatigue  Cant sleep due to pain  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 Link Hatchet Ryce    Review of systems:  Constitutional: negative for fever, chills, weight loss, night sweats   10 systems reviewed and negative other then HPI       Past Medical History:   Diagnosis Date    Autoimmune disease ([facility] [address]     fibromyalgia    Bandemia 10/2/2020    Dr Ancelmo Richards, also anemia [facility]    Clotting disorder ([facility] [address]     thrombocypentia    Depression     and anxiety    Fibromyalgia     GERD (gastroesophageal reflux disease)     Hx of mammogram 03/2021        Past Surgical History:   Procedure Laterality Date    HX COLONOSCOPY  01/19/2021    HX GYN      fallopian tube removed after tubal pregnancy    HX GYN      hysteroscopy/laporoscopy to remove schar tissue    HX OTHER SURGICAL      oral surgery for wisdom teeth, root canal       Allergies   Allergen Reactions    Advil Pm [Ibuprofen-Diphenhydramine Cit] Hives     Can take benadryl without issue    Percocet [Oxycodone-Acetaminophen] Itching    Tobramycin Swelling     Eye drop       Current Outpatient Medications on File Prior to Visit   Medication Sig Dispense Refill    EPINEPHrine (EpiPen) 0.3 mg/0.3 mL injection 0.3 mg by IntraMUSCular route once as needed for Allergic Response.  famotidine (PEPCID) 20 mg tablet Take 20 mg by mouth two (2) times a day.  Cetirizine (ZyrTEC) 10 mg cap Take 10 mg by mouth daily.  fexofenadine (ALLEGRA) 180 mg tablet Take 180 mg by mouth daily.       ergocalciferol (ERGOCALCIFEROL) 1,250 mcg (50,000 unit) capsule TAKE 1 CAPSULE BY MOUTH EVERY 7 DAYS. 4 Capsule 0    naproxen (NAPROSYN) 500 mg tablet Take 1 Tablet by mouth two (2) times daily (with meals). (Patient taking differently: Take 500 mg by mouth as needed.) 20 Tablet 0    topiramate (TOPAMAX) 50 mg tablet TAKE 1 TABLET DAILY 90 Tablet 1    cyclobenzaprine (FLEXERIL) 10 mg tablet TAKE 1 TABLET BY MOUTH EVERY NIGHT AT BEDTIME AS NEEDED FOR SPASM      albuterol (PROAIR HFA) 90 mcg/actuation inhaler ProAir HFA 90 mcg/actuation aerosol inhaler 1 Inhaler 0    metFORMIN (GLUMETZA ER) 500 mg TG24 24 hour tablet Take 500 mg by mouth daily.  IBUPROFEN PO Take 800 mg by mouth as needed.  acetaminophen (TYLENOL EXTRA STRENGTH) 500 mg tablet Take  by mouth every six (6) hours as needed for Pain.      L.acid/L.casei/B.bif/B.donnie/FOS (PROBIOTIC BLEND PO) Take  by mouth. From [facility]      metoprolol succinate (TOPROL-XL) 25 mg XL tablet TAKE 1 TABLET BY MOUTH DAILY (Patient not taking: Reported on 12/13/2021) 90 Tablet 3     No current facility-administered medications on file prior to visit. family history includes Asthma in her mother; Diabetes in her maternal grandfather and paternal grandmother; Hypertension in her mother and paternal grandmother; Psychiatric Disorder in her mother; Stroke in her paternal grandmother.     Social History     Socioeconomic History    Marital status:      Spouse name: Not on file    Number of children: Not on file    Years of education: Not on file    Highest education level: Not on file   Occupational History    Not on file   Tobacco Use    Smoking status: Never Smoker    Smokeless tobacco: Never Used   Vaping Use    Vaping Use: Never used   Substance and Sexual Activity    Alcohol use: No    Drug use: No    Sexual activity: Not on file   Other Topics Concern    Not on file   Social History Narrative    Not on file     Social Determinants of Health     Financial Resource Strain:     Difficulty of Paying Living Expenses: Not on file   Food Insecurity:     Worried About [address] in the Last Year: Not on file    Srinivasa of Food in the Last Year: Not on file   Transportation Needs:     Lack of Transportation (Medical): Not on file    Lack of Transportation (Non-Medical): Not on file   Physical Activity:     Days of Exercise per Week: Not on file    Minutes of Exercise per Session: Not on file   Stress:     Feeling of Stress : Not on file   Social Connections:     Frequency of Communication with Friends and Family: Not on file    Frequency of Social Gatherings with Friends and Family: Not on file    Attends Bahai Services: Not on file    Active Member of [address] [place] [address] [employer] or Organizations: Not on file    Attends Club or Organization Meetings: Not on file    Marital Status: Not on file   Intimate Partner Violence:     Fear of Current or Ex-Partner: Not on file    Emotionally Abused: Not on file    Physically Abused: Not on file    Sexually Abused: Not on file   Housing Stability:     Unable to Pay for Housing in the Last Year: Not on file    Number of [place] in the Last Year: Not on file    Unstable Housing in the Last Year: Not on file       Visit Vitals  /76   Pulse 67   Temp 97 °F (36.1 °C) (Temporal)   Resp 16   Ht 5' 6.5\" (1.689 m)   Wt 177 lb 6.4 oz (80.5 kg)   LMP 12/18/2021 (Exact Date)   SpO2 100%   BMI 28.20 kg/m²     General:  Well appearing female no acute distress  HEENT:   PERRL,normal conjunctiva. External ear and canals normal, TMs normal.  Hearing normal to voice. Neck:  Supple. Thyroid normal size, nontender, without nodules. No carotid bruit. Respiratory: no respiratory distress,  no wheezing, no rhonchi, no rales. No chest wall tenderness. Cardiovascular:  RRR, normal S1S2, no murmur. Musculoskeletal:  Normal gait. Normal digits and nails. Normal strength and tone, no atrophy, and no abnormal movement. Skin:  No rash, no lesions, no ulcers. Neuro:  A and OX4, fluent speech, cranial nerves normal 2-12.    Psych: anxious  affect      Lab Results   Component Value Date/Time    WBC 3.8 12/16/2019 12:42 PM    Hemoglobin, POC 11.9 (L) 06/01/2010 05:35 PM    HGB 11.7 12/16/2019 12:42 PM    Hematocrit, POC 35 (L) 06/01/2010 05:35 PM    HCT 35.8 12/16/2019 12:42 PM    PLATELET 146 (H) 05/53/1096 12:42 PM    MCV 82 12/16/2019 12:42 PM     Lab Results   Component Value Date/Time    Sodium 138 03/10/2021 12:41 PM    Potassium 4.4 03/10/2021 12:41 PM    Chloride 104 03/10/2021 12:41 PM    CO2 22 03/10/2021 12:41 PM    Anion gap 5 06/27/2018 06:58 PM    Glucose 90 03/10/2021 12:41 PM    BUN 8 03/10/2021 12:41 PM    Creatinine 0.92 03/10/2021 12:41 PM    BUN/Creatinine ratio 9 03/10/2021 12:41 PM    GFR est AA 88 03/10/2021 12:41 PM    GFR est non-AA 77 03/10/2021 12:41 PM    Calcium 9.3 03/10/2021 12:41 PM     Lab Results   Component Value Date/Time    Cholesterol, total 206 (H) 03/10/2021 12:41 PM    HDL Cholesterol 60 03/10/2021 12:41 PM    LDL, calculated 128 (H) 03/10/2021 12:41 PM    LDL, calculated 160 (H) 12/16/2019 12:42 PM    VLDL, calculated 18 03/10/2021 12:41 PM    VLDL, calculated 17 12/16/2019 12:42 PM    Triglyceride 102 03/10/2021 12:41 PM     Lab Results   Component Value Date/Time    TSH 1.220 03/10/2021 12:41 PM     Lab Results   Component Value Date/Time    Hemoglobin A1c 5.3 03/10/2021 12:41 PM     Lab Results   Component Value Date/Time    VITAMIN D, 25-HYDROXY 35.3 10/26/2020 11:59 AM                   Assessment and Plan:     1. anaphyla subse  Patient had anaphylaxis unclear cause has seen allergist  Has epi pens   FMLA     2. Brief psychotic disorder ([facility] [address]  Has hx of anxiety and depression has tried several medications in the past   Pt has tried gabapentin, lyrica, cymbalta  - REFERRAL TO PSYCHIATRY    3. Fibromyalgia  Pt has tried gabapentin, lyrica, Cymbalta  Misses about 3 days per month due to severe pain     4. Anxiety  - REFERRAL TO PSYCHIATRY  Did not tolerate SSRIs   - busPIRone (BUSPAR) 7.5 mg tablet; Take 1 Tablet by mouth three (3) times daily.   Dispense: 90 Tablet; Refill: 1      Follow-up and Dispositions    · Return in about 4 weeks (around 1/18/2022) for fibromyalgia.           Johana Lomax MD

## 2021-12-21 NOTE — TELEPHONE ENCOUNTER
Please fax patient a note that states she was seen in the office today. Her fax number is [phone]. Thanks.

## 2022-01-27 ENCOUNTER — VIRTUAL VISIT (OUTPATIENT)
Dept: INTERNAL MEDICINE CLINIC | Age: 42
End: 2022-01-27
Payer: COMMERCIAL

## 2022-01-27 ENCOUNTER — LAB ONLY (OUTPATIENT)
Dept: INTERNAL MEDICINE CLINIC | Age: 42
End: 2022-01-27

## 2022-01-27 DIAGNOSIS — Z11.59 ENCOUNTER FOR HEPATITIS C SCREENING TEST FOR LOW RISK PATIENT: ICD-10-CM

## 2022-01-27 DIAGNOSIS — L65.9 HAIR LOSS: ICD-10-CM

## 2022-01-27 DIAGNOSIS — E78.00 HIGH CHOLESTEROL: ICD-10-CM

## 2022-01-27 DIAGNOSIS — E28.2 PCOS (POLYCYSTIC OVARIAN SYNDROME): ICD-10-CM

## 2022-01-27 DIAGNOSIS — F23 BRIEF PSYCHOTIC DISORDER (HCC): ICD-10-CM

## 2022-01-27 DIAGNOSIS — F41.9 ANXIETY: Primary | ICD-10-CM

## 2022-01-27 DIAGNOSIS — M79.7 FIBROMYALGIA: ICD-10-CM

## 2022-01-27 DIAGNOSIS — I47.1 PSVT (PAROXYSMAL SUPRAVENTRICULAR TACHYCARDIA) (HCC): ICD-10-CM

## 2022-01-27 PROCEDURE — 99214 OFFICE O/P EST MOD 30 MIN: CPT | Performed by: INTERNAL MEDICINE

## 2022-01-27 NOTE — PROGRESS NOTES
Carmel Wu (: 1980) is a 35 y.o. female, established patient, here for evaluation of the following chief complaint(s):   Follow Up Chronic Condition       ASSESSMENT/PLAN:  Below is the assessment and plan developed based on review of pertinent history, labs, studies, and medications. Carmel Wu, was evaluated through a synchronous (real-time) audio-video encounter. The patient (or guardian if applicable) is aware that this is a billable service, which includes applicable co-pays. Verbal consent to proceed has been obtained. The visit was conducted pursuant to the emergency declaration under the [facility], [address] [place] [address] authority and the [employer] and [employer] Act. Patient identification was verified, and a caregiver was present when appropriate. The patient was located at home in a state where the provider was licensed to provide care. An electronic signature was used to authenticate this note.   -- Javier Ibrahim LPN

## 2022-01-27 NOTE — PROGRESS NOTES
CC: Follow Up Chronic Condition      HPI:    She is a 35 y.o. female who presents for evaluation of follow up     Previous visit established care    Previous visit FMLA done for anaphylaxis reaction and thatn was approved     She has been on several medications for anxiety and currently doing better on buspar . 5mg TID and feels anxiety is better. Last tried wellbutrin which caused palpitations  Per chart review has tried medications such as zoloft, lexapro, effexor and and prestiq and had bad side effects  She saw a psych at [place], she was given prazosin which caused side effect, she stopped following with them because they were out of network     She sees Dr. Kiara Cummings (onco) for thrombocytosis in [place], [place]     She had palpitations and saw cardiology   had  Exercise stress test done 6/2021 without evidence of ischemia; completed 10.1 METs on Peter Protocol      30-day event monitor done 5/2021 with PACs and sinus tachycardia associated with symptoms  Echo done 6/2021 with preserved LVEF 55-60%, no significant valvular pathology  Symptoms resolved  Associates resolution of symptoms when she stopped wellbutrin       Pt does not follow with a rheum for her fibromyalgia  Previously, provided referral for rheumatology,  Pt has tried gabapentin, lyrica, cymbalta, but had SE from all of these   pain in legs and hips back and neck and left shoulder  Chronic fatigue  When pain is severe cannot concentrate misses 3 days of work per month     Taking OTC move free which helps     Continues on metformin 500mg once daily for PCOS    PREVENTIVE:  Pap/womans care: Dr. Carmen Erazo  Due to recent anaphylaxis patient has not gotten vaccinated for COVID -19 \" does social distance and wears masks\"         This is an established visit conducted via telemedicine with video. The patient has been instructed that this meets HIPAA criteria and acknowledges and agrees to this method of visitation.      Pursuant to the emergency declaration under the [facility], [facility] waiver authority and the [employer] Act, this Virtual Visit was conducted, with patient's consent, to reduce the patient's risk of exposure to COVID-19 and provide continuity of care for an established patient. Services were provided through a video synchronous discussion virtually to substitute for in-person clinic visit. ROS:  Constitutional: negative for fevers, chills, anorexia and weight loss  Eyes:   negative for visual disturbance,  irritation  ENT:   negative for tinnitus,sore throat,nasal congestion,ear pain, sinus pain. Respiratory:  negative for cough, hemoptysis, dyspnea,wheezing  CV:   negative for chest pain, palpitations, lower extremity edema  GI:   negative for nausea, vomiting, diarrhea, abdominal pain,melena  Genitourinary: negative for frequency, dysuria, hematuria  Musculoskel: Has chronic pain in several muscle groups  Neurological:  negative for headaches, dizziness, focal weakness, numbness  Psych: Anxiety is better on buspar     Past Medical History:   Diagnosis Date    Autoimmune disease ([facility] [address]     fibromyalgia    Bandemia 10/2/2020    Dr Myrtle Tsang, also anemia [facility]    Clotting disorder (HCC)     thrombocypentia    Depression     and anxiety    Fibromyalgia     GERD (gastroesophageal reflux disease)     Hx of mammogram 03/2021       Current Outpatient Medications on File Prior to Visit   Medication Sig Dispense Refill    busPIRone (BUSPAR) 7.5 mg tablet Take 1 Tablet by mouth three (3) times daily. 90 Tablet 1    EPINEPHrine (EpiPen) 0.3 mg/0.3 mL injection 0.3 mg by IntraMUSCular route once as needed for Allergic Response.  Cetirizine (ZyrTEC) 10 mg cap Take 10 mg by mouth daily.  fexofenadine (ALLEGRA) 180 mg tablet Take 180 mg by mouth daily.       ergocalciferol (ERGOCALCIFEROL) 1,250 mcg (50,000 unit) capsule TAKE 1 CAPSULE BY MOUTH EVERY 7 DAYS. 4 Capsule 0    naproxen (NAPROSYN) 500 mg tablet Take 1 Tablet by mouth two (2) times daily (with meals). (Patient taking differently: Take 500 mg by mouth as needed.) 20 Tablet 0    topiramate (TOPAMAX) 50 mg tablet TAKE 1 TABLET DAILY 90 Tablet 1    cyclobenzaprine (FLEXERIL) 10 mg tablet TAKE 1 TABLET BY MOUTH EVERY NIGHT AT BEDTIME AS NEEDED FOR SPASM      metFORMIN (GLUMETZA ER) 500 mg TG24 24 hour tablet Take 500 mg by mouth daily.  IBUPROFEN PO Take 800 mg by mouth as needed.  acetaminophen (TYLENOL EXTRA STRENGTH) 500 mg tablet Take  by mouth every six (6) hours as needed for Pain.      L.acid/L.casei/B.bif/B.donnie/FOS (PROBIOTIC BLEND PO) Take  by mouth. From [facility]       No current facility-administered medications on file prior to visit.        Past Surgical History:   Procedure Laterality Date    HX COLONOSCOPY  01/19/2021    HX DENTAL TRANSPLANT  01/24/2022    HX GYN      fallopian tube removed after tubal pregnancy    HX GYN      hysteroscopy/laporoscopy to remove schar tissue    HX OTHER SURGICAL      oral surgery for wisdom teeth, root canal       Family History   Problem Relation Age of Onset    Hypertension Mother     Psychiatric Disorder Mother         depression/anxiety    Asthma Mother     Diabetes Maternal Grandfather     Diabetes Paternal Grandmother     Hypertension Paternal Grandmother     Stroke Paternal Grandmother      Reviewed and no changes     Social History     Socioeconomic History    Marital status:      Spouse name: Not on file    Number of children: Not on file    Years of education: Not on file    Highest education level: Not on file   Occupational History    Not on file   Tobacco Use    Smoking status: Never Smoker    Smokeless tobacco: Never Used   Vaping Use    Vaping Use: Never used   Substance and Sexual Activity    Alcohol use: No    Drug use: No    Sexual activity: Not on file   Other Topics Concern    Not on file   Social History Narrative    Not on file     Social Determinants of Health     Financial Resource Strain:     Difficulty of Paying Living Expenses: Not on file   Food Insecurity:     Worried About Running Out of Food in the Last Year: Not on file    Srinivasa of Food in the Last Year: Not on file   Transportation Needs:     Lack of Transportation (Medical): Not on file    Lack of Transportation (Non-Medical): Not on file   Physical Activity:     Days of Exercise per Week: Not on file    Minutes of Exercise per Session: Not on file   Stress:     Feeling of Stress : Not on file   Social Connections:     Frequency of Communication with Friends and Family: Not on file    Frequency of Social Gatherings with Friends and Family: Not on file    Attends [employer] Services: Not on file    Active Member of [address] [place] [address] or Organizations: Not on file    Attends Club or Organization Meetings: Not on file    Marital Status: Not on file   Intimate Partner Violence:     Fear of Current or Ex-Partner: Not on file    Emotionally Abused: Not on file    Physically Abused: Not on file    Sexually Abused: Not on file   Housing Stability:     Unable to Pay for Housing in the Last Year: Not on file    Number of [place] in the Last Year: Not on file    Unstable Housing in the Last Year: Not on file          There were no vitals taken for this visit. Physical Examination:   Gen: well appearing female  HEENT: normal conjunctiva, no audible congestion, patient does not see oral erythema, has MMM  Neck: patient does not feel enlarged or tender LAD or masses  Resp: normal respiratory effort, no audible wheezing. CV: patient does not feel palpitations or heart irregularity  Abd: patient does not feel abdominal tenderness or mass, patient does not notice distension  Extrem: patient does not see swelling in ankles or joints.    Neuro: Alert and oriented, able to answer questions without difficulty, able to move all extremities and walk normally          Lab Results   Component Value Date/Time    WBC 3.8 12/16/2019 12:42 PM    Hemoglobin, POC 11.9 (L) 06/01/2010 05:35 PM    HGB 11.7 12/16/2019 12:42 PM    Hematocrit, POC 35 (L) 06/01/2010 05:35 PM    HCT 35.8 12/16/2019 12:42 PM    PLATELET 140 (H) 86/41/0120 12:42 PM    MCV 82 12/16/2019 12:42 PM     Lab Results   Component Value Date/Time    Sodium 138 03/10/2021 12:41 PM    Potassium 4.4 03/10/2021 12:41 PM    Chloride 104 03/10/2021 12:41 PM    CO2 22 03/10/2021 12:41 PM    Anion gap 5 06/27/2018 06:58 PM    Glucose 90 03/10/2021 12:41 PM    BUN 8 03/10/2021 12:41 PM    Creatinine 0.92 03/10/2021 12:41 PM    BUN/Creatinine ratio 9 03/10/2021 12:41 PM    GFR est AA 88 03/10/2021 12:41 PM    GFR est non-AA 77 03/10/2021 12:41 PM    Calcium 9.3 03/10/2021 12:41 PM     Lab Results   Component Value Date/Time    Cholesterol, total 206 (H) 03/10/2021 12:41 PM    HDL Cholesterol 60 03/10/2021 12:41 PM    LDL, calculated 128 (H) 03/10/2021 12:41 PM    LDL, calculated 160 (H) 12/16/2019 12:42 PM    VLDL, calculated 18 03/10/2021 12:41 PM    VLDL, calculated 17 12/16/2019 12:42 PM    Triglyceride 102 03/10/2021 12:41 PM     Lab Results   Component Value Date/Time    TSH 1.220 03/10/2021 12:41 PM     No results found for: PSA, Cee Crum, [id number], [id number]  Lab Results   Component Value Date/Time    Hemoglobin A1c 5.3 03/10/2021 12:41 PM     Lab Results   Component Value Date/Time    VITAMIN D, 25-HYDROXY 35.3 10/26/2020 11:59 AM       Lab Results   Component Value Date/Time    ALT (SGPT) 16 03/10/2021 12:41 PM    Alk. phosphatase 49 03/10/2021 12:41 PM    Bilirubin, total 0.3 03/10/2021 12:41 PM           Assessment/Plan:    1. Brief psychotic disorder ([address]  Resolved and doing well   This was likely steroid induced    2.  PSVT (paroxysmal supraventricular tachycardia) (HCC)  Saw Dr Miguel Phillips and had 9 beats of PSVT recommended BB patient is not taking it, symptoms resolved    3. Anxiety  Doing better on buspar    4. Fibromyalgia  Encouraged to exercise, regular exercise   has intermittent leave for work for flair ups      5.severe allergic reaction: patient had work up need records    6. PCOS: on metformin   Will check HA1C    7. Hair loss  Will check the following labs  If labs normal then consider stopping topamax can rarely cause hair loss    Orders Placed This Encounter    METABOLIC PANEL, COMPREHENSIVE     Standing Status:   Future     Standing Expiration Date:   1/27/2023    TSH 3RD GENERATION     Standing Status:   Future     Standing Expiration Date:   1/27/2023    CBC WITH AUTOMATED DIFF     Standing Status:   Future     Standing Expiration Date:   1/27/2023    FERRITIN     Standing Status:   Future     Standing Expiration Date:   1/27/2023    VITAMIN D, 25 HYDROXY     Standing Status:   Future     Standing Expiration Date:   1/27/2023    HEPATITIS C AB     Standing Status:   Future     Standing Expiration Date:   1/27/2023    LIPID PANEL     Standing Status:   Future     Standing Expiration Date:   1/27/2023    HEMOGLOBIN A1C WITH EAG     Standing Status:   Future     Standing Expiration Date:   1/27/2023     Follow-up and Dispositions    · Return in about 3 months (around 4/27/2022). Dick Kay MD    This is an established visit conducted via real time video and audio telemedicine. The patient has been instructed that this meets HIPAA criteria and acknowledges and agrees to this method of visitation.

## 2022-01-28 LAB
25(OH)D3 SERPL-MCNC: 52.9 NG/ML (ref 30–100)
ALBUMIN SERPL-MCNC: 3.9 G/DL (ref 3.5–5)
ALBUMIN/GLOB SERPL: 1.3 {RATIO} (ref 1.1–2.2)
ALP SERPL-CCNC: 56 U/L (ref 45–117)
ALT SERPL-CCNC: 18 U/L (ref 12–78)
ANION GAP SERPL CALC-SCNC: 3 MMOL/L (ref 5–15)
AST SERPL-CCNC: 15 U/L (ref 15–37)
BASOPHILS # BLD: 0.1 K/UL (ref 0–0.1)
BASOPHILS NFR BLD: 2 % (ref 0–1)
BILIRUB SERPL-MCNC: 0.4 MG/DL (ref 0.2–1)
BUN SERPL-MCNC: 12 MG/DL (ref 6–20)
BUN/CREAT SERPL: 14 (ref 12–20)
CALCIUM SERPL-MCNC: 9.2 MG/DL (ref 8.5–10.1)
CHLORIDE SERPL-SCNC: 104 MMOL/L (ref 97–108)
CHOLEST SERPL-MCNC: 256 MG/DL
CO2 SERPL-SCNC: 28 MMOL/L (ref 21–32)
CREAT SERPL-MCNC: 0.83 MG/DL (ref 0.55–1.02)
DIFFERENTIAL METHOD BLD: ABNORMAL
EOSINOPHIL # BLD: 0.2 K/UL (ref 0–0.4)
EOSINOPHIL NFR BLD: 5 % (ref 0–7)
ERYTHROCYTE [DISTWIDTH] IN BLOOD BY AUTOMATED COUNT: 13.8 % (ref 11.5–14.5)
EST. AVERAGE GLUCOSE BLD GHB EST-MCNC: 100 MG/DL
FERRITIN SERPL-MCNC: 11 NG/ML (ref 26–388)
GLOBULIN SER CALC-MCNC: 3.1 G/DL (ref 2–4)
GLUCOSE SERPL-MCNC: 87 MG/DL (ref 65–100)
HBA1C MFR BLD: 5.1 % (ref 4–5.6)
HCT VFR BLD AUTO: 38.6 % (ref 35–47)
HCV AB SERPL QL IA: NONREACTIVE
HDLC SERPL-MCNC: 69 MG/DL
HDLC SERPL: 3.7 {RATIO} (ref 0–5)
HGB BLD-MCNC: 12.1 G/DL (ref 11.5–16)
IMM GRANULOCYTES # BLD AUTO: 0 K/UL (ref 0–0.04)
IMM GRANULOCYTES NFR BLD AUTO: 0 % (ref 0–0.5)
LDLC SERPL CALC-MCNC: 163.8 MG/DL (ref 0–100)
LYMPHOCYTES # BLD: 1.1 K/UL (ref 0.8–3.5)
LYMPHOCYTES NFR BLD: 31 % (ref 12–49)
MCH RBC QN AUTO: 28 PG (ref 26–34)
MCHC RBC AUTO-ENTMCNC: 31.3 G/DL (ref 30–36.5)
MCV RBC AUTO: 89.4 FL (ref 80–99)
MONOCYTES # BLD: 0.4 K/UL (ref 0–1)
MONOCYTES NFR BLD: 12 % (ref 5–13)
NEUTS SEG # BLD: 1.9 K/UL (ref 1.8–8)
NEUTS SEG NFR BLD: 50 % (ref 32–75)
NRBC # BLD: 0 K/UL (ref 0–0.01)
NRBC BLD-RTO: 0 PER 100 WBC
PLATELET # BLD AUTO: 543 K/UL (ref 150–400)
PMV BLD AUTO: 10.4 FL (ref 8.9–12.9)
POTASSIUM SERPL-SCNC: 4.2 MMOL/L (ref 3.5–5.1)
PROT SERPL-MCNC: 7 G/DL (ref 6.4–8.2)
RBC # BLD AUTO: 4.32 M/UL (ref 3.8–5.2)
SODIUM SERPL-SCNC: 135 MMOL/L (ref 136–145)
TRIGL SERPL-MCNC: 116 MG/DL (ref ?–150)
TSH SERPL DL<=0.05 MIU/L-ACNC: 1.3 UIU/ML (ref 0.36–3.74)
VLDLC SERPL CALC-MCNC: 23.2 MG/DL
WBC # BLD AUTO: 3.7 K/UL (ref 3.6–11)

## 2022-02-07 ENCOUNTER — DOCUMENTATION ONLY (OUTPATIENT)
Dept: SOCIAL WORK | Age: 42
End: 2022-02-07

## 2022-02-07 NOTE — PROGRESS NOTES
Attempted to meet with client for virtual visit. Shared with client limitations of my role and that it is limited to short term model (6-8 sessions). Client shared that she wants long term therapist.  Provided client with information on local therapists and will inform referring provider.

## 2022-02-14 NOTE — PROGRESS NOTES
No diabetes   Normal kidney and liver function   Normal thyroid  Platelets are elevated but stable this has been present for 10 years plu  Normal hemoglobin

## 2022-02-21 ENCOUNTER — OFFICE VISIT (OUTPATIENT)
Dept: INTERNAL MEDICINE CLINIC | Age: 42
End: 2022-02-21
Payer: COMMERCIAL

## 2022-02-21 VITALS
RESPIRATION RATE: 16 BRPM | BODY MASS INDEX: 29.19 KG/M2 | SYSTOLIC BLOOD PRESSURE: 110 MMHG | HEART RATE: 74 BPM | TEMPERATURE: 97.2 F | HEIGHT: 67 IN | WEIGHT: 186 LBS | OXYGEN SATURATION: 100 % | DIASTOLIC BLOOD PRESSURE: 78 MMHG

## 2022-02-21 DIAGNOSIS — G44.229 CHRONIC TENSION-TYPE HEADACHE, NOT INTRACTABLE: Primary | ICD-10-CM

## 2022-02-21 DIAGNOSIS — F32.A ANXIETY AND DEPRESSION: ICD-10-CM

## 2022-02-21 DIAGNOSIS — F41.9 ANXIETY AND DEPRESSION: ICD-10-CM

## 2022-02-21 DIAGNOSIS — M79.7 FIBROMYALGIA: ICD-10-CM

## 2022-02-21 PROCEDURE — 99214 OFFICE O/P EST MOD 30 MIN: CPT | Performed by: INTERNAL MEDICINE

## 2022-02-21 NOTE — PROGRESS NOTES
Identified pt with two pt identifiers(name and ). Reviewed record in preparation for visit and have obtained necessary documentation. Chief Complaint   Patient presents with    Follow-up     Would like to discuss lab results    Hip Pain    Medication Refill        Vitals:    22 1354   BP: 110/78   Pulse: 74   Resp: 16   Temp: 97.2 °F (36.2 °C)   TempSrc: Temporal   SpO2: 100%   Weight: 186 lb (84.4 kg)   Height: 5' 6.5\" (1.689 m)   PainSc:   5   PainLoc: Hip   LMP: 2022       Health Maintenance Due   Topic    COVID-19 Vaccine (1)    DTaP/Tdap/Td series (1 - Tdap)    Cervical cancer screen     Flu Vaccine (1)       Coordination of Care Questionnaire:  :   1) Have you been to an emergency room, urgent care, or hospitalized since your last visit? If yes, where when, and reason for visit? no       2. Have seen or consulted any other health care provider since your last visit? If yes, where when, and reason for visit? YES  Dental Work    Patient is accompanied by self I have received verbal consent from [facility] to discuss any/all medical information while they are present in the room.

## 2022-02-21 NOTE — LETTER
NOTIFICATION RETURN TO WORK / SCHOOL    2/21/2022 2:14 PM    Ms. King Curry  [address] [address]      To Whom It May Concern:    Detris Clyde Hammans is currently under the care of [facility]. Patient would benefit from a stand up movable desk and ergonomic chair. If there are questions or concerns please have the patient contact our office.         Sincerely,      Soco Raymundo MD

## 2022-02-21 NOTE — PROGRESS NOTES
Ms. Wil Newton is presenting to follow up     CC:  Follow-up (Would like to discuss lab results), Hip Pain, and Medication Refill       HPI:      Saw ortho for hip pain and suspects coming from back and plans to have PT      She has been on several medications for anxiety and currently doing better on buspar7 .5mg TID and feels anxiety is better. Last tried wellbutrin which caused palpitations  Per chart review has tried medications such as zoloft, lexapro, effexor and and prestiq and had bad side effects  She saw a psych at [place], she was given prazosin which caused side effect, she stopped following with them because they were out of network     Buspar is working well, feels leveled.        She sees Dr. Etsephania Casillas (onco) for thrombocytosis in [place]    Reviewed labs with patient  Cholesterol is elevated 163 LDL  Previously 128     Hair loss: work up was negative   stopped Topamax 2-3 weeks ago  Started ketoconazole shampoo for seborrhea dermatitis      Review of systems:  Constitutional: negative for fever, chills, weight loss, night sweats   10 systems reviewed and negative other then HPI     Past Medical History:   Diagnosis Date    Autoimmune disease ([facility] [place] [address]     fibromyalgia    Bandemia 10/2/2020    Dr Milagros Ly, also anemia [facility]    Clotting disorder ([facility] [place] [address]     thrombocypentia    Depression     and anxiety    Fibromyalgia     GERD (gastroesophageal reflux disease)     Hx of mammogram 03/2021        Past Surgical History:   Procedure Laterality Date    HX COLONOSCOPY  01/19/2021    HX DENTAL TRANSPLANT  01/24/2022    HX GYN      fallopian tube removed after tubal pregnancy    HX GYN      hysteroscopy/laporoscopy to remove schar tissue    HX OTHER SURGICAL      oral surgery for wisdom teeth, root canal       Allergies   Allergen Reactions    Advil Pm [Ibuprofen-Diphenhydramine Cit] Hives     Can take benadryl without issue    Percocet [Oxycodone-Acetaminophen] Itching    Tobramycin Swelling     Eye drop       Current Outpatient Medications on File Prior to Visit   Medication Sig Dispense Refill    ergocalciferol (ERGOCALCIFEROL) 1,250 mcg (50,000 unit) capsule TAKE 1 CAPSULE BY MOUTH EVERY 7 DAYS 4 Capsule 5    busPIRone (BUSPAR) 7.5 mg tablet Take 1 Tablet by mouth three (3) times daily. 90 Tablet 1    EPINEPHrine (EpiPen) 0.3 mg/0.3 mL injection 0.3 mg by IntraMUSCular route once as needed for Allergic Response.  Cetirizine (ZyrTEC) 10 mg cap Take 10 mg by mouth daily.  fexofenadine (ALLEGRA) 180 mg tablet Take 180 mg by mouth daily.  naproxen (NAPROSYN) 500 mg tablet Take 1 Tablet by mouth two (2) times daily (with meals). (Patient taking differently: Take 500 mg by mouth as needed.) 20 Tablet 0    cyclobenzaprine (FLEXERIL) 10 mg tablet Per patient, she takes TID      metFORMIN (GLUMETZA ER) 500 mg TG24 24 hour tablet Take 500 mg by mouth daily.  IBUPROFEN PO Take 800 mg by mouth as needed.  acetaminophen (TYLENOL EXTRA STRENGTH) 500 mg tablet Take  by mouth every six (6) hours as needed for Pain.      L.acid/L.casei/B.bif/B.donnie/FOS (PROBIOTIC BLEND PO) Take  by mouth. From [address] [place] [address],[address]      topiramate (TOPAMAX) 50 mg tablet TAKE 1 TABLET DAILY (Patient not taking: Reported on 2/21/2022) 90 Tablet 1     No current facility-administered medications on file prior to visit. family history includes Asthma in her mother; Diabetes in her maternal grandfather and paternal grandmother; Hypertension in her mother and paternal grandmother; Psychiatric Disorder in her mother; Stroke in her paternal grandmother.     Social History     Socioeconomic History    Marital status:      Spouse name: Not on file    Number of children: Not on file    Years of education: Not on file    Highest education level: Not on file   Occupational History    Not on file   Tobacco Use    Smoking status: Never Smoker    Smokeless tobacco: Never Used   Vaping Use    Vaping Use: Never used   Substance and Sexual Activity    Alcohol use: No    Drug use: No    Sexual activity: Not on file   Other Topics Concern    Not on file   Social History Narrative    Not on file     Social Determinants of Health     Financial Resource Strain:     Difficulty of Paying Living Expenses: Not on file   Food Insecurity:     Worried About Running Out of Food in the Last Year: Not on file    Srinivasa of Food in the Last Year: Not on file   Transportation Needs:     Lack of Transportation (Medical): Not on file    Lack of Transportation (Non-Medical): Not on file   Physical Activity:     Days of Exercise per Week: Not on file    Minutes of Exercise per Session: Not on file   Stress:     Feeling of Stress : Not on file   Social Connections:     Frequency of Communication with Friends and Family: Not on file    Frequency of Social Gatherings with Friends and Family: Not on file    Attends Mu-ism Services: Not on file    Active Member of [address] [place] [address] or Organizations: Not on file    Attends Club or Organization Meetings: Not on file    Marital Status: Not on file   Intimate Partner Violence:     Fear of Current or Ex-Partner: Not on file    Emotionally Abused: Not on file    Physically Abused: Not on file    Sexually Abused: Not on file   Housing Stability:     Unable to Pay for Housing in the Last Year: Not on file    Number of [place] in the Last Year: Not on file    Unstable Housing in the Last Year: Not on file       Visit Vitals  /78   Pulse 74   Temp 97.2 °F (36.2 °C) (Temporal)   Resp 16   Ht 5' 6.5\" (1.689 m)   Wt 186 lb (84.4 kg)   LMP 02/18/2022 (Exact Date)   SpO2 100%   BMI 29.57 kg/m²     General:  Well appearing female no acute distress  HEENT:   PERRL,normal conjunctiva. External ear and canals normal, TMs normal.   Neck:  Supple. Thyroid normal size, nontender, without nodules. No carotid bruit.  No masses or lymphadenopathy  Respiratory: no respiratory distress,  no wheezing, no rhonchi, no rales. No chest wall tenderness. Cardiovascular:  RRR, normal S1S2, no murmur. Gastrointestinal: normal bowel sounds, soft, nontender, without masses. No hepatosplenomegaly. Extremities +2 pulses, no edema, normal sensation   Musculoskeletal:  Normal gait. Normal digits and nails. Normal strength and tone, no atrophy, and no abnormal movement. Skin:  No rash, no lesions, no ulcers. Skin warm, normal turgor, without induration or nodules. Neuro:  A and OX4, fluent speech, cranial nerves normal 2-12.     Psych:  Normal affect      Lab Results   Component Value Date/Time    WBC 3.7 01/27/2022 03:23 PM    Hemoglobin, POC 11.9 (L) 06/01/2010 05:35 PM    HGB 12.1 01/27/2022 03:23 PM    Hematocrit, POC 35 (L) 06/01/2010 05:35 PM    HCT 38.6 01/27/2022 03:23 PM    PLATELET 384 (H) 19/06/8942 03:23 PM    MCV 89.4 01/27/2022 03:23 PM     Lab Results   Component Value Date/Time    Sodium 135 (L) 01/27/2022 03:23 PM    Potassium 4.2 01/27/2022 03:23 PM    Chloride 104 01/27/2022 03:23 PM    CO2 28 01/27/2022 03:23 PM    Anion gap 3 (L) 01/27/2022 03:23 PM    Glucose 87 01/27/2022 03:23 PM    BUN 12 01/27/2022 03:23 PM    Creatinine 0.83 01/27/2022 03:23 PM    BUN/Creatinine ratio 14 01/27/2022 03:23 PM    GFR est AA >60 01/27/2022 03:23 PM    GFR est non-AA >60 01/27/2022 03:23 PM    Calcium 9.2 01/27/2022 03:23 PM     Lab Results   Component Value Date/Time    Cholesterol, total 256 (H) 01/27/2022 03:23 PM    HDL Cholesterol 69 01/27/2022 03:23 PM    LDL, calculated 163.8 (H) 01/27/2022 03:23 PM    VLDL, calculated 23.2 01/27/2022 03:23 PM    Triglyceride 116 01/27/2022 03:23 PM    CHOL/HDL Ratio 3.7 01/27/2022 03:23 PM     Lab Results   Component Value Date/Time    TSH 1.30 01/27/2022 03:23 PM     Lab Results   Component Value Date/Time    Hemoglobin A1c 5.1 01/27/2022 03:23 PM     Lab Results   Component Value Date/Time    Vitamin D 25-Hydroxy 52.9 01/27/2022 03:23 PM Assessment and Plan:     1. Chronic tension-type headache, not intractable  - stopped topamax due to concern for hair loss     2. Fibromyalgia: Encouraged to exercise, regular exercise   has intermittent leave for work for flair ups      3. Anxiety and depression  - doing better on Buspar 7.5mg TID    4.  High cholesterol: reviewed with patient need to work on healthy diet, and regular exercise       Soco Raymundo MD

## 2022-02-22 RX ORDER — NAPROXEN 500 MG/1
500 TABLET ORAL
Qty: 60 TABLET | Refills: 1 | Status: SHIPPED | OUTPATIENT
Start: 2022-02-22 | End: 2022-07-15 | Stop reason: SDUPTHER

## 2022-02-22 NOTE — TELEPHONE ENCOUNTER
----- Message from Carmel Patel So sent at 2/22/2022  1:25 PM EST -----  Regarding: Pain meds  Dr. Maira Mary,  Is it possible to get a Rx of Naproxen. I took some remaining pills I had from before a couple of weeks ago and it appeared to help my pain more than the Motrin and Tylenol surpringly. I have no more Rx of the Naproxen left. Can you please prescribe 500mg or higher?

## 2022-02-24 DIAGNOSIS — F41.9 ANXIETY: ICD-10-CM

## 2022-02-24 RX ORDER — BUSPIRONE HYDROCHLORIDE 7.5 MG/1
7.5 TABLET ORAL 3 TIMES DAILY
Qty: 90 TABLET | Refills: 1 | Status: SHIPPED | OUTPATIENT
Start: 2022-02-24 | End: 2022-04-26 | Stop reason: SDUPTHER

## 2022-03-18 ENCOUNTER — TRANSCRIBE ORDER (OUTPATIENT)
Dept: SCHEDULING | Age: 42
End: 2022-03-18

## 2022-03-18 DIAGNOSIS — Z12.31 SCREENING MAMMOGRAM FOR HIGH-RISK PATIENT: Primary | ICD-10-CM

## 2022-03-18 DIAGNOSIS — R92.2 BREAST DENSITY: ICD-10-CM

## 2022-03-18 PROBLEM — M79.7 FIBROMYALGIA: Status: ACTIVE | Noted: 2019-12-16

## 2022-03-18 PROBLEM — F41.9 ANXIETY AND DEPRESSION: Status: ACTIVE | Noted: 2019-12-16

## 2022-03-18 PROBLEM — D72.825 BANDEMIA: Status: ACTIVE | Noted: 2020-10-02

## 2022-03-18 PROBLEM — F32.A ANXIETY AND DEPRESSION: Status: ACTIVE | Noted: 2019-12-16

## 2022-03-18 PROBLEM — E28.2 PCOS (POLYCYSTIC OVARIAN SYNDROME): Status: ACTIVE | Noted: 2019-12-16

## 2022-03-19 PROBLEM — G44.229 CHRONIC TENSION-TYPE HEADACHE, NOT INTRACTABLE: Status: ACTIVE | Noted: 2019-12-16

## 2022-03-21 ENCOUNTER — OFFICE VISIT (OUTPATIENT)
Dept: INTERNAL MEDICINE CLINIC | Age: 42
End: 2022-03-21
Payer: COMMERCIAL

## 2022-03-21 VITALS
WEIGHT: 197 LBS | OXYGEN SATURATION: 99 % | HEART RATE: 66 BPM | SYSTOLIC BLOOD PRESSURE: 124 MMHG | HEIGHT: 67 IN | BODY MASS INDEX: 30.92 KG/M2 | TEMPERATURE: 98.2 F | RESPIRATION RATE: 16 BRPM | DIASTOLIC BLOOD PRESSURE: 83 MMHG

## 2022-03-21 DIAGNOSIS — M54.12 CERVICAL RADICULOPATHY: Primary | ICD-10-CM

## 2022-03-21 DIAGNOSIS — L21.9 SEBORRHEIC DERMATITIS: ICD-10-CM

## 2022-03-21 PROCEDURE — 99214 OFFICE O/P EST MOD 30 MIN: CPT | Performed by: INTERNAL MEDICINE

## 2022-03-21 RX ORDER — KETOCONAZOLE 20 MG/ML
SHAMPOO TOPICAL
Qty: 1 EACH | Refills: 2 | Status: SHIPPED | OUTPATIENT
Start: 2022-03-21 | End: 2022-08-10 | Stop reason: SDUPTHER

## 2022-03-21 RX ORDER — PREDNISONE 10 MG/1
TABLET ORAL
COMMUNITY
End: 2022-07-12 | Stop reason: ALTCHOICE

## 2022-03-21 NOTE — PROGRESS NOTES
1. \"Have you been to the ER, urgent care clinic since your last visit? Hospitalized since your last visit? \" No    2. \"Have you seen or consulted any other health care providers outside of the [address] [place] [address] since your last visit? \" Yes Reason for visit: shilo GUTIERREZ and     3. For patients aged 39-70: Has the patient had a colonoscopy / FIT/ Cologuard? Yes - no Care Gap present      If the patient is female:    4. For patients aged 41-77: Has the patient had a mammogram within the past 2 years? No      5. For patients aged 21-65: Has the patient had a pap smear? Yes - Care Gap present.  Rooming MA/LPN to request most recent results

## 2022-03-21 NOTE — PATIENT INSTRUCTIONS
1. Cervical radiculopathy  Physical therapy and TENs unit  Complete steroids    2. Seborrheic dermatitis  - ketoconazole (NIZORAL) 2 % shampoo;  Use 5 ML apply to scalp once a week as needed  Leave on for 5 minutes prior to rinsing  Dispense: 1 Each; Refill: 2

## 2022-03-21 NOTE — PROGRESS NOTES
Ms. Krissy Reyes is presenting to follow up     CC:  Numbness (Right arm and hand)       HPI:    Ms Calvin Kidd is presenting with Right arm and hand complains. Complains of two weeks of hand numbness in R thumb and at times entire hand but concentrates in thumb   \" Numb to the touch\"  She saw ortho on call and had x ray of neck and given prednisone   No improvement  The tingling starts at shoulder and radiates down  Does have some neck pain   Currently doing PT for low back pain and will start exercises for neck    X ray showed anterior osteophyte at C4    Has increased stress  Mother's deaf date coming up   On buspar for anxiety 7.5mg TID        Review of systems:  Constitutional: negative for fever, chills, weight loss, night sweats   10 systems reviewed and negative other then HPI      Past Medical History:   Diagnosis Date    Autoimmune disease ([facility] [address]     fibromyalgia    Bandemia 10/2/2020    Dr feliz, also anemia [facility]    Clotting disorder ([facility] [address]     thrombocypentia    Depression     and anxiety    Fibromyalgia     GERD (gastroesophageal reflux disease)     Hx of mammogram 03/2021        Past Surgical History:   Procedure Laterality Date    HX COLONOSCOPY  01/19/2021    HX DENTAL TRANSPLANT  01/24/2022    HX GYN      fallopian tube removed after tubal pregnancy    HX GYN      hysteroscopy/laporoscopy to remove schar tissue    HX OTHER SURGICAL      oral surgery for wisdom teeth, root canal       Allergies   Allergen Reactions    Latex Rash    Advil Pm [Ibuprofen-Diphenhydramine Cit] Hives     Can take benadryl without issue    Percocet [Oxycodone-Acetaminophen] Itching    Tobramycin Swelling     Eye drop       Current Outpatient Medications on File Prior to Visit   Medication Sig Dispense Refill    predniSONE (DELTASONE) 10 mg tablet Take  by mouth daily (with breakfast).  busPIRone (BUSPAR) 7.5 mg tablet Take 1 Tablet by mouth three (3) times daily.  90 Tablet 1    naproxen (NAPROSYN) 500 mg tablet Take 1 Tablet by mouth two (2) times daily as needed for Pain. 60 Tablet 1    ergocalciferol (ERGOCALCIFEROL) 1,250 mcg (50,000 unit) capsule TAKE 1 CAPSULE BY MOUTH EVERY 7 DAYS 4 Capsule 5    EPINEPHrine (EpiPen) 0.3 mg/0.3 mL injection 0.3 mg by IntraMUSCular route once as needed for Allergic Response.  Cetirizine (ZyrTEC) 10 mg cap Take 10 mg by mouth daily.  fexofenadine (ALLEGRA) 180 mg tablet Take 180 mg by mouth daily.  cyclobenzaprine (FLEXERIL) 10 mg tablet Per patient, she takes TID      metFORMIN (GLUMETZA ER) 500 mg TG24 24 hour tablet Take 500 mg by mouth daily.  IBUPROFEN PO Take 800 mg by mouth as needed.  acetaminophen (TYLENOL EXTRA STRENGTH) 500 mg tablet Take  by mouth every six (6) hours as needed for Pain.      L.acid/L.casei/B.bif/B.donnie/FOS (PROBIOTIC BLEND PO) Take  by mouth. From [facility]       No current facility-administered medications on file prior to visit. family history includes Asthma in her mother; Diabetes in her maternal grandfather and paternal grandmother; Hypertension in her mother and paternal grandmother; Psychiatric Disorder in her mother; Stroke in her paternal grandmother.     Social History     Socioeconomic History    Marital status:      Spouse name: Not on file    Number of children: Not on file    Years of education: Not on file    Highest education level: Not on file   Occupational History    Not on file   Tobacco Use    Smoking status: Never Smoker    Smokeless tobacco: Never Used   Vaping Use    Vaping Use: Never used   Substance and Sexual Activity    Alcohol use: No    Drug use: No    Sexual activity: Not on file   Other Topics Concern    Not on file   Social History Narrative    Not on file     Social Determinants of Health     Financial Resource Strain:     Difficulty of Paying Living Expenses: Not on file   Food Insecurity:     Worried About Running Out of Food in the Last Year: Not on file   [facility] Ran Out of Food in the Last Year: Not on file   Transportation Needs:     Lack of Transportation (Medical): Not on file    Lack of Transportation (Non-Medical): Not on file   Physical Activity:     Days of Exercise per Week: Not on file    Minutes of Exercise per Session: Not on file   Stress:     Feeling of Stress : Not on file   Social Connections:     Frequency of Communication with Friends and Family: Not on file    Frequency of Social Gatherings with Friends and Family: Not on file    Attends Synagogue Services: Not on file    Active Member of [address] [place] [address] [employer] or Organizations: Not on file    Attends Club or Organization Meetings: Not on file    Marital Status: Not on file   Intimate Partner Violence:     Fear of Current or Ex-Partner: Not on file    Emotionally Abused: Not on file    Physically Abused: Not on file    Sexually Abused: Not on file   Housing Stability:     Unable to Pay for Housing in the Last Year: Not on file    Number of [place] in the Last Year: Not on file    Unstable Housing in the Last Year: Not on file       Visit Vitals  /83   Pulse 66   Temp 98.2 °F (36.8 °C) (Temporal)   Resp 16   Ht 5' 6.5\" (1.689 m)   Wt 197 lb (89.4 kg)   LMP 02/17/2022 (Exact Date)   SpO2 99%   BMI 31.32 kg/m²     General:  Well appearing female no acute distress  Neck:  Supple. Thyroid normal size, nontender, without nodules. No carotid bruit. No masses or lymphadenopathy  Respiratory: no respiratory distress,  no wheezing, no rhonchi, no rales. No chest wall tenderness. Cardiovascular:  RRR, normal S1S2, no murmur. Gastrointestinal: normal bowel sounds, soft, nontender, without masses. No hepatosplenomegaly. Extremities +2 pulses, no edema, normal sensation   Musculoskeletal:  Normal gait. Normal digits and nails. Normal strength and tone, no atrophy, and no abnormal movement.   Normal shoulder exam, normal strength on right arm  Has FROM  Slightly decreased sensation to pinprick on thumb R  Normal strength    Generally Skin:  No rash, no lesions, no ulcers. Skin warm, normal turgor, without induration or nodules. Neuro:  A and OX4, fluent speech, cranial nerves normal 2-12. Psych:  Normal affect      Lab Results   Component Value Date/Time    WBC 3.7 01/27/2022 03:23 PM    Hemoglobin, POC 11.9 (L) 06/01/2010 05:35 PM    HGB 12.1 01/27/2022 03:23 PM    Hematocrit, POC 35 (L) 06/01/2010 05:35 PM    HCT 38.6 01/27/2022 03:23 PM    PLATELET 716 (H) 30/42/8328 03:23 PM    MCV 89.4 01/27/2022 03:23 PM     Lab Results   Component Value Date/Time    Sodium 135 (L) 01/27/2022 03:23 PM    Potassium 4.2 01/27/2022 03:23 PM    Chloride 104 01/27/2022 03:23 PM    CO2 28 01/27/2022 03:23 PM    Anion gap 3 (L) 01/27/2022 03:23 PM    Glucose 87 01/27/2022 03:23 PM    BUN 12 01/27/2022 03:23 PM    Creatinine 0.83 01/27/2022 03:23 PM    BUN/Creatinine ratio 14 01/27/2022 03:23 PM    GFR est AA >60 01/27/2022 03:23 PM    GFR est non-AA >60 01/27/2022 03:23 PM    Calcium 9.2 01/27/2022 03:23 PM     Lab Results   Component Value Date/Time    Cholesterol, total 256 (H) 01/27/2022 03:23 PM    HDL Cholesterol 69 01/27/2022 03:23 PM    LDL, calculated 163.8 (H) 01/27/2022 03:23 PM    VLDL, calculated 23.2 01/27/2022 03:23 PM    Triglyceride 116 01/27/2022 03:23 PM    CHOL/HDL Ratio 3.7 01/27/2022 03:23 PM     Lab Results   Component Value Date/Time    TSH 1.30 01/27/2022 03:23 PM     Lab Results   Component Value Date/Time    Hemoglobin A1c 5.1 01/27/2022 03:23 PM     Lab Results   Component Value Date/Time    Vitamin D 25-Hydroxy 52.9 01/27/2022 03:23 PM                   Assessment and Plan:     1. Cervical radiculopathy  Patient had x ray done at [facility] will start PT for neck and on prednisone. Continue current therapy if no improvement will obtain MRI cervical    2. Seborrheic dermatitis  ketoconazole (NIZORAL) 2 % shampoo;  Use 5 ML apply to scalp once a week as needed  Leave on for 5 minutes prior to rinsing Dispense: 1 Each; Refill: 2           Eva Burch MD

## 2022-04-01 ENCOUNTER — HOSPITAL ENCOUNTER (OUTPATIENT)
Dept: MAMMOGRAPHY | Age: 42
Discharge: HOME | End: 2022-04-01
Attending: OBSTETRICS & GYNECOLOGY
Payer: COMMERCIAL

## 2022-04-01 DIAGNOSIS — Z12.31 SCREENING MAMMOGRAM FOR HIGH-RISK PATIENT: ICD-10-CM

## 2022-04-01 DIAGNOSIS — R92.2 BREAST DENSITY: ICD-10-CM

## 2022-04-01 PROCEDURE — 77063 BREAST TOMOSYNTHESIS BI: CPT

## 2022-04-07 ENCOUNTER — CLINICAL SUPPORT (OUTPATIENT)
Dept: INTERNAL MEDICINE CLINIC | Age: 42
End: 2022-04-07
Payer: COMMERCIAL

## 2022-04-07 ENCOUNTER — DOCUMENTATION ONLY (OUTPATIENT)
Dept: INTERNAL MEDICINE CLINIC | Age: 42
End: 2022-04-07

## 2022-04-07 VITALS — TEMPERATURE: 97.9 F

## 2022-04-07 DIAGNOSIS — N19 RENAL FAILURE, UNSPECIFIED CHRONICITY: Primary | ICD-10-CM

## 2022-04-07 LAB
BILIRUB UR QL STRIP: NEGATIVE
GLUCOSE UR-MCNC: NEGATIVE MG/DL
KETONES P FAST UR STRIP-MCNC: NEGATIVE MG/DL
PH UR STRIP: 7 [PH] (ref 4.6–8)
PROT UR QL STRIP: NEGATIVE
SP GR UR STRIP: 1.01 (ref 1–1.03)
UA UROBILINOGEN AMB POC: NORMAL (ref 0.2–1)
URINALYSIS CLARITY POC: CLEAR
URINALYSIS COLOR POC: YELLOW
URINE BLOOD POC: NORMAL
URINE LEUKOCYTES POC: NEGATIVE
URINE NITRITES POC: NEGATIVE

## 2022-04-07 PROCEDURE — 81001 URINALYSIS AUTO W/SCOPE: CPT | Performed by: INTERNAL MEDICINE

## 2022-04-07 NOTE — PROGRESS NOTES
Patient presents to the office to drop off FMLA form for App[employer] Jessi Marin MD. Patient advised of 7-10 business day turnaround time for form completion & may incur a fee of $25.00. This has been fully explained to the patient, who indicates understanding.

## 2022-04-07 NOTE — PROGRESS NOTES
Identified pt with two pt identifiers(name and ). Reviewed record in preparation for visit and have obtained necessary documentation. Chief Complaint   Patient presents with    Other     abnormal urine    Fatigue        Vitals:    22 1154   Temp: 97.9 °F (36.6 °C)   TempSrc: Temporal     UA was ordered and reviewed by . I called the patient and advised her per  her urine sample is normal. Pt verbalized understanding of information discussed w/ no further questions at this time.       Alexa Flood, 63 [address]  . Juliana Mi [facility], [address] [place] [address]  W: [phone]  F: [phone]

## 2022-04-14 ENCOUNTER — TELEPHONE (OUTPATIENT)
Dept: INTERNAL MEDICINE CLINIC | Age: 42
End: 2022-04-14

## 2022-04-14 NOTE — TELEPHONE ENCOUNTER
----- Message from Adela Oakes sent at 4/13/2022  5:16 PM EDT -----  Subject: Message to Provider    QUESTIONS  Information for Provider? patient is calling to check the status of   disability paperwork that she provided us a week ago. Paperwork needs to   be turned in within the next few days. Please contact the patient asap   with an update.   ---------------------------------------------------------------------------  --------------  CALL BACK INFO  What is the best way for the office to contact you? OK to leave message on   voicemail  Preferred Call Back Phone Number? [phone]  ---------------------------------------------------------------------------  --------------  SCRIPT ANSWERS  Relationship to Patient?  Self

## 2022-04-26 DIAGNOSIS — F41.9 ANXIETY: ICD-10-CM

## 2022-04-26 RX ORDER — BUSPIRONE HYDROCHLORIDE 7.5 MG/1
7.5 TABLET ORAL 3 TIMES DAILY
Qty: 90 TABLET | Refills: 1 | Status: SHIPPED | OUTPATIENT
Start: 2022-04-26 | End: 2022-07-12 | Stop reason: ALTCHOICE

## 2022-05-02 ENCOUNTER — TELEPHONE (OUTPATIENT)
Dept: INTERNAL MEDICINE CLINIC | Age: 42
End: 2022-05-02

## 2022-05-02 NOTE — TELEPHONE ENCOUNTER
[phone]    Patient called today enquiring about forms that were not completed \"in a timely manner\" resulting in denial of patient claim/request.        Form title: Accommodation Extension Request Disability Form   Requesting an additional break throughout the day at work  Received by office:   4/7/22  Scanned into media:  Original blank forms, 4/7/22  Pt attempted follow up:   4/14/22  Form due date:   4/20/22      Patient received letter that her claim was denied. Pt would like to speak with a supervisor. Pt wants to know what happened. Pt wants to know what can be done.       Please call [phone]

## 2022-05-03 ENCOUNTER — DOCUMENTATION ONLY (OUTPATIENT)
Dept: INTERNAL MEDICINE CLINIC | Age: 42
End: 2022-05-03

## 2022-06-20 ENCOUNTER — TELEPHONE (OUTPATIENT)
Dept: INTERNAL MEDICINE CLINIC | Age: 42
End: 2022-06-20

## 2022-06-20 NOTE — TELEPHONE ENCOUNTER
----- Message from April Aline sent at 6/20/2022 12:12 PM EDT -----  Subject: Message to Provider    QUESTIONS  Information for Provider? Patient needs her accommodation paperwork resent   to [facility], Dr. Diana Garcia sent the extension paperwork back in April   for patient. Patient needs this paperwork to state that the 10 minute   accommodation for an additional break needs to be unscheduled (as it has   been in the past.) patient states that the paperwork in april didn't   specify this and they need to have this specified in the accommodation. Patient would also like this paperwork once resent, faxed to her as well   [phone]. please call back  ---------------------------------------------------------------------------  --------------  CALL BACK INFO  What is the best way for the office to contact you? OK to leave message on   voicemail  Preferred Call Back Phone Number? [phone]  ---------------------------------------------------------------------------  --------------  SCRIPT ANSWERS  Relationship to Patient?  Self

## 2022-06-22 ENCOUNTER — TELEPHONE (OUTPATIENT)
Dept: INTERNAL MEDICINE CLINIC | Age: 42
End: 2022-06-22

## 2022-06-22 NOTE — TELEPHONE ENCOUNTER
#[phone]  Pt states she needs a call back pertaining to the status of paperwork that she needs for her job. They are giving her a problem about this she states. Please call as soon as possible as pt has been waiting she states.

## 2022-06-29 ENCOUNTER — TELEPHONE (OUTPATIENT)
Dept: INTERNAL MEDICINE CLINIC | Age: 42
End: 2022-06-29

## 2022-06-29 NOTE — TELEPHONE ENCOUNTER
This writer refaxed paperwork. Awaiting conformation if the fax went through.    Signed By: Luisa Calderon LPN     June 29, 7117

## 2022-06-29 NOTE — TELEPHONE ENCOUNTER
Patient states she needs to get Accommodation Letter re-faxed to her Fax Machine [employer] for Meeting with [employer] today. Patient states she was unaware that when sent 1st time there was No Ink in her Fax. Please call if any questions.  Thank you    Fax# is [phone]

## 2022-06-29 NOTE — TELEPHONE ENCOUNTER
Paperwork was re-faxed from the phone room. fax went through, it was confirmed success. The patient was notified.    Signed By: La Montemayor LPN     June 29, 7044

## 2022-07-12 ENCOUNTER — VIRTUAL VISIT (OUTPATIENT)
Dept: INTERNAL MEDICINE CLINIC | Age: 42
End: 2022-07-12
Payer: COMMERCIAL

## 2022-07-12 DIAGNOSIS — F41.8 SITUATIONAL ANXIETY: Primary | ICD-10-CM

## 2022-07-12 DIAGNOSIS — G44.229 CHRONIC TENSION-TYPE HEADACHE, NOT INTRACTABLE: ICD-10-CM

## 2022-07-12 PROCEDURE — 99214 OFFICE O/P EST MOD 30 MIN: CPT | Performed by: INTERNAL MEDICINE

## 2022-07-12 RX ORDER — TOPIRAMATE 50 MG/1
50 TABLET, FILM COATED ORAL 2 TIMES DAILY
Qty: 60 TABLET | Refills: 1 | Status: SHIPPED | OUTPATIENT
Start: 2022-07-12 | End: 2022-09-23 | Stop reason: SDUPTHER

## 2022-07-12 NOTE — PROGRESS NOTES
CC: Follow-up, Hip Pain (left), Anxiety, Neck Pain, and Weight Management      HPI:    She is a 38 y.o. female who presents for evaluation of anxiety    Patient has has ongoing left hip pain and did 6 weeks of PT   And noted no improvement      Cervical radiculopathy  Patient had x ray done at [facility]  With PT has helped  Also doing acupuncture which helps    Anxiety: she associates a lot of anxiety with stress at work. Has been there for 20 + years and has good health insurance  She feels that the job is too demanding  Doing technical support/ billing  She feels she has been harassed from supervisor - \" that person is no longer there\"  Then mother  the following day which caused more anxiety  She reports once that supervisor left the other supervisors have mistreated her  She feels she has been held back from promotions   Taking customer services calls and frequently cursed at   [facility] that she is constantly stressed  I dont have anyone on my side \" me against the world\"     Stopped taking buspar \" feels better off of the medication\"       She is concerned with weight gain wants med to help with weight loss    She has frequent migraines and previously was on topamax    This is an established visit conducted via telemedicine with video. The patient has been instructed that this meets HIPAA criteria and acknowledges and agrees to this method of visitation. Pursuant to the emergency declaration under the [facility], 1135 waiver authority and the [employer] General Act, this Virtual Visit was conducted, with patient's consent, to reduce the patient's risk of exposure to COVID-19 and provide continuity of care for an established patient. Services were provided through a video synchronous discussion virtually to substitute for in-person clinic visit.        ROS:  10 systems reviewed and negative other then HPI     Past Medical History:   Diagnosis Date    Autoimmune disease ([facility] [address]     fibromyalgia    Bandemia 10/2/2020    Dr Gallo Alcaraz, also anemia [facility]    Clotting disorder (HCC)     thrombocypentia    Depression     and anxiety    Fibromyalgia     GERD (gastroesophageal reflux disease)     Hx of mammogram 03/2021       Current Outpatient Medications on File Prior to Visit   Medication Sig Dispense Refill    ketoconazole (NIZORAL) 2 % shampoo Use 5 ML apply to scalp once a week as needed  Leave on for 5 minutes prior to rinsing 1 Each 2    naproxen (NAPROSYN) 500 mg tablet Take 1 Tablet by mouth two (2) times daily as needed for Pain. 60 Tablet 1    ergocalciferol (ERGOCALCIFEROL) 1,250 mcg (50,000 unit) capsule TAKE 1 CAPSULE BY MOUTH EVERY 7 DAYS 4 Capsule 5    EPINEPHrine (EpiPen) 0.3 mg/0.3 mL injection 0.3 mg by IntraMUSCular route once as needed for Allergic Response.  Cetirizine (ZyrTEC) 10 mg cap Take 10 mg by mouth daily.  fexofenadine (ALLEGRA) 180 mg tablet Take 180 mg by mouth daily.  cyclobenzaprine (FLEXERIL) 10 mg tablet Per patient, she takes TID      metFORMIN (GLUMETZA ER) 500 mg TG24 24 hour tablet Take 500 mg by mouth daily.  IBUPROFEN PO Take 800 mg by mouth as needed.  acetaminophen (TYLENOL EXTRA STRENGTH) 500 mg tablet Take  by mouth every six (6) hours as needed for Pain.      L.acid/L.casei/B.bif/B.donnie/FOS (PROBIOTIC BLEND PO) Take  by mouth. From [facility]       No current facility-administered medications on file prior to visit.        Past Surgical History:   Procedure Laterality Date    HX COLONOSCOPY  01/19/2021    HX DENTAL TRANSPLANT  01/24/2022    HX GYN      fallopian tube removed after tubal pregnancy    HX GYN      hysteroscopy/laporoscopy to remove schar tissue    HX OTHER SURGICAL      oral surgery for wisdom teeth, root canal       Family History   Problem Relation Age of Onset    Hypertension Mother     Psychiatric Disorder Mother         depression/anxiety  Asthma Mother     Diabetes Maternal Grandfather     Diabetes Paternal Grandmother     Hypertension Paternal Grandmother     Stroke Paternal Grandmother      Reviewed and no changes     Social History     Socioeconomic History    Marital status:      Spouse name: Not on file    Number of children: Not on file    Years of education: Not on file    Highest education level: Not on file   Occupational History    Not on file   Tobacco Use    Smoking status: Never Smoker    Smokeless tobacco: Never Used   Vaping Use    Vaping Use: Never used   Substance and Sexual Activity    Alcohol use: No    Drug use: No    Sexual activity: Not on file   Other Topics Concern    Not on file   Social History Narrative    Not on file     Social Determinants of Health     Financial Resource Strain:     Difficulty of Paying Living Expenses: Not on file   Food Insecurity:     Worried About [address] in the Last Year: Not on file    [address] [place] in the Last Year: Not on file   Transportation Needs:     Lack of Transportation (Medical): Not on file    Lack of Transportation (Non-Medical):  Not on file   Physical Activity:     Days of Exercise per Week: Not on file    Minutes of Exercise per Session: Not on file   Stress:     Feeling of Stress : Not on file   Social Connections:     Frequency of Communication with Friends and Family: Not on file    Frequency of Social Gatherings with Friends and Family: Not on file    Attends Synagogue Services: Not on file    Active Member of Clubs or Organizations: Not on file    Attends Club or Organization Meetings: Not on file    Marital Status: Not on file   Intimate Partner Violence:     Fear of Current or Ex-Partner: Not on file    Emotionally Abused: Not on file    Physically Abused: Not on file    Sexually Abused: Not on file   Housing Stability:     Unable to Pay for Housing in the Last Year: Not on file    Number of [place] in the Last Year: Not on file    Unstable Housing in the Last Year: Not on file          There were no vitals taken for this visit. Physical Examination:   Gen: well appearing female  HEENT: normal conjunctiva, no audible congestion, patient does not see oral erythema, has MMM  Neck: patient does not feel enlarged or tender LAD or masses  Resp: normal respiratory effort, no audible wheezing. CV: patient does not feel palpitations or heart irregularity  Abd: patient does not feel abdominal tenderness or mass, patient does not notice distension  Extrem: patient does not see swelling in ankles or joints.    Neuro: Alert and oriented, able to answer questions without difficulty, able to move all extremities and walk normally          Lab Results   Component Value Date/Time    WBC 3.7 01/27/2022 03:23 PM    Hemoglobin, POC 11.9 (L) 06/01/2010 05:35 PM    HGB 12.1 01/27/2022 03:23 PM    Hematocrit, POC 35 (L) 06/01/2010 05:35 PM    HCT 38.6 01/27/2022 03:23 PM    PLATELET 444 (H) 28/25/1396 03:23 PM    MCV 89.4 01/27/2022 03:23 PM     Lab Results   Component Value Date/Time    Sodium 135 (L) 01/27/2022 03:23 PM    Potassium 4.2 01/27/2022 03:23 PM    Chloride 104 01/27/2022 03:23 PM    CO2 28 01/27/2022 03:23 PM    Anion gap 3 (L) 01/27/2022 03:23 PM    Glucose 87 01/27/2022 03:23 PM    BUN 12 01/27/2022 03:23 PM    Creatinine 0.83 01/27/2022 03:23 PM    BUN/Creatinine ratio 14 01/27/2022 03:23 PM    GFR est AA >60 01/27/2022 03:23 PM    GFR est non-AA >60 01/27/2022 03:23 PM    Calcium 9.2 01/27/2022 03:23 PM     Lab Results   Component Value Date/Time    Cholesterol, total 256 (H) 01/27/2022 03:23 PM    HDL Cholesterol 69 01/27/2022 03:23 PM    LDL, calculated 163.8 (H) 01/27/2022 03:23 PM    VLDL, calculated 23.2 01/27/2022 03:23 PM    Triglyceride 116 01/27/2022 03:23 PM    CHOL/HDL Ratio 3.7 01/27/2022 03:23 PM     Lab Results   Component Value Date/Time    TSH 1.30 01/27/2022 03:23 PM     No results found for: PSA, PSA2, Rani Subramanian, [id number], [id number]  Lab Results   Component Value Date/Time    Hemoglobin A1c 5.1 01/27/2022 03:23 PM     Lab Results   Component Value Date/Time    Vitamin D 25-Hydroxy 52.9 01/27/2022 03:23 PM       Lab Results   Component Value Date/Time    ALT (SGPT) 18 01/27/2022 03:23 PM    Alk. phosphatase 56 01/27/2022 03:23 PM    Bilirubin, total 0.4 01/27/2022 03:23 PM           Assessment/Plan:    1. Situational anxiety  Patient reports significant stress associated with her job. She reports harrassment. She has reached to supervisors without resolution  I have counseled her to find a different job. She did not tolerate medications for anxiety such as buspar, or wellbutrin or SSRIs    2. Weight gain: advised patient to eat a healthy diet  Cannot take phentermine or wellbutrin caused palpitations     3. Headaches migraines: advised to resumed topamax which can also help with weight loss        Follow-up and Dispositions    · Return in about 4 weeks (around 8/9/2022). Estee Ziegler MD    This is an established visit conducted via real time video and audio telemedicine. The patient has been instructed that this meets HIPAA criteria and acknowledges and agrees to this method of visitation.

## 2022-07-12 NOTE — PROGRESS NOTES
1. \"Have you been to the ER, urgent care clinic since your last visit? Hospitalized since your last visit? \" No    2. \"Have you seen or consulted any other health care providers outside of the [address] [place] [address] since your last visit? \" Yes Reason for visit: Dr. Vandana Bauer and Dr.Ammar Angela Garay Dental problems      3. For patients aged 39-70: Has the patient had a colonoscopy / FIT/ Cologuard? NA - based on age      If the patient is female:    4. For patients aged 41-77: Has the patient had a mammogram within the past 2 years? Yes - no Care Gap present      5. For patients aged 21-65: Has the patient had a pap smear? Yes - Care Gap present.  Rooming MA/LPN to request most recent results

## 2022-07-15 NOTE — TELEPHONE ENCOUNTER
Future Appointments:  No future appointments. Last Appointment With Me:  7/12/2022     Requested Prescriptions     Pending Prescriptions Disp Refills    naproxen (NAPROSYN) 500 mg tablet 60 Tablet 1     Sig: Take 1 Tablet by mouth two (2) times daily as needed for Pain.

## 2022-07-18 RX ORDER — NAPROXEN 500 MG/1
500 TABLET ORAL
Qty: 60 TABLET | Refills: 1 | Status: SHIPPED | OUTPATIENT
Start: 2022-07-18

## 2022-07-20 ENCOUNTER — PATIENT MESSAGE (OUTPATIENT)
Dept: INTERNAL MEDICINE CLINIC | Age: 42
End: 2022-07-20

## 2022-07-25 NOTE — TELEPHONE ENCOUNTER
Accepted triage call     Patient wanting to know if md had fmla paperwork     She said she will refax them both     One is for mental reasons the other is for her pain r/t fibromyalgia    Patient was upset and crying because she said the letter uploaded by md is not enough to keep her out  of work long enough     She said she hasnt made any appts  yet     Notified she should make the appts with desmond thakkar and dr Jovita Jimenez as advised by md     She has information she stated and she agreed to do so     Explained md needs the mental health notes to support documentation     Advised she notify us of any mental health appt or psych so we can be on the same page       She agreed to do so  Notified her of any paperwork rec'd via fax will place for md to fill out   Em lpn

## 2022-07-26 ENCOUNTER — TELEPHONE (OUTPATIENT)
Dept: INTERNAL MEDICINE CLINIC | Age: 42
End: 2022-07-26

## 2022-07-26 NOTE — TELEPHONE ENCOUNTER
Patient contacted triage line due to having dizzy spells when she stands up too fast (started today) and also \"pale color stool\" she noticed this morning. Patient was diagnosed with COVID and took the last pills of Paxlovid this morning. Not able to take O2 or Blood pressure at home. Patient denies fever. Urgent message sent to Dr San Hamman for follow up and any new orders. Will call Patient back as needed.

## 2022-07-27 ENCOUNTER — DOCUMENTATION ONLY (OUTPATIENT)
Dept: INTERNAL MEDICINE CLINIC | Age: 42
End: 2022-07-27

## 2022-07-27 NOTE — PROGRESS NOTES
Dr. Manuela Guzman requested to find psychiatric provider accepting new patients, faxed over referral to [facility] for care for pt, confirmation also received.

## 2022-08-03 ENCOUNTER — TELEPHONE (OUTPATIENT)
Dept: INTERNAL MEDICINE CLINIC | Age: 42
End: 2022-08-03

## 2022-08-03 NOTE — TELEPHONE ENCOUNTER
Patient states she needs a call back to discuss if Dr. Caro José has received her FMLA Forms & if she can complete the Medical Portion of her forms as patient reports she has been out of work since 7/15/22 & forms were/are due 20 days from that 7/15/22 date. Please call to discuss & advise.  Thank you

## 2022-08-04 NOTE — TELEPHONE ENCOUNTER
[facility] Paperwork has been completed,  needs to sign 1 page. Will fax what has been filled out.   Signed By: Manuel Coppola LPN     August 4, 3476

## 2022-08-05 ENCOUNTER — VIRTUAL VISIT (OUTPATIENT)
Dept: SOCIAL WORK | Age: 42
End: 2022-08-05
Payer: COMMERCIAL

## 2022-08-05 DIAGNOSIS — F43.23 ADJUSTMENT DISORDER WITH MIXED ANXIETY AND DEPRESSED MOOD: Primary | ICD-10-CM

## 2022-08-05 PROCEDURE — 90791 PSYCH DIAGNOSTIC EVALUATION: CPT | Performed by: SOCIAL WORKER

## 2022-08-05 NOTE — PROGRESS NOTES
Outpatient Initial Assessment and Psychotherapy Note     Chief Complaint:     Chief Complaint   Patient presents with    Anxiety    Depression         History of Present Illness: Carmel Jaffe is a 38 y.o. female who presents with symptoms of depression and anxiety  Client is referred for individual psychotherapy by her PCP, Dr. Roque Laguna MD.  Client reports experiencing the following clinical symptoms:  depressed mood, anxiety, difficulty relaxing, sleep and appetite disturbance, lack of energy, trouble concentrating and anhedonia. She denies both suicidal and homicidal ideation. Psychosocial stressors that impact mood include: work related stressors, health issues (fibromyalgia and chronic fatigue) and prolonged grief. Significant Social History:  Client was born and raised in [place]. She has 3 sisters and 1 brother. Client states her father was murdered in robbery attempt when she was 8years old. At that time, her paternal grandmother took her and her sister in to help raise them as her own mother was expecting another baby at the time of her father's death. While grandmother helped to raise her, client states her mother was always present in her life and they had a close relationship. Client reports a good early childhood--free of trauma/abuse. After graduating from high school, client went to college in [place] and earned her degree in business administration    Client has never been , nor does she have any children. Client has worked for [employer] for 22 years and states that in the last several years, it became a very difficult and \"toxic environment. \"  She states that while at job she was harassed and she felt targeted. She had a difficult relationship with her supervisor and tried to address with HR to no avail. The stress of the job caused her triggered her fibromyalgia and she is presently on FMLA from job due to ongoing stress.      Client also shared that her grandmother  in 2018 and this was a very difficult loss as they were very close. She was appointed executor of grandmother's estate and this caused her to be estranged from siblings. In 2019, her mother  unexpectedly of an asthma attack and her stepfather  a day later from a heart attack. Work related stress, grief and health issues is what prompted referral for counseling. Substance Abuse History:    Denies      Current  Medication List:   Current Outpatient Medications   Medication Sig Dispense Refill    naproxen (NAPROSYN) 500 mg tablet Take 1 Tablet by mouth two (2) times daily as needed for Pain. 60 Tablet 1    topiramate (TOPAMAX) 50 mg tablet Take 1 Tablet by mouth two (2) times a day. 60 Tablet 1    ketoconazole (NIZORAL) 2 % shampoo Use 5 ML apply to scalp once a week as needed  Leave on for 5 minutes prior to rinsing 1 Each 2    ergocalciferol (ERGOCALCIFEROL) 1,250 mcg (50,000 unit) capsule TAKE 1 CAPSULE BY MOUTH EVERY 7 DAYS 4 Capsule 5    EPINEPHrine (EpiPen) 0.3 mg/0.3 mL injection 0.3 mg by IntraMUSCular route once as needed for Allergic Response. Cetirizine (ZyrTEC) 10 mg cap Take 10 mg by mouth daily. fexofenadine (ALLEGRA) 180 mg tablet Take 180 mg by mouth daily. cyclobenzaprine (FLEXERIL) 10 mg tablet Per patient, she takes TID      metFORMIN (GLUMETZA ER) 500 mg TG24 24 hour tablet Take 500 mg by mouth daily. IBUPROFEN PO Take 800 mg by mouth as needed. acetaminophen (TYLENOL EXTRA STRENGTH) 500 mg tablet Take  by mouth every six (6) hours as needed for Pain. L.acid/L.casei/B.bif/B.donnie/FOS (PROBIOTIC BLEND PO) Take  by mouth.  From [facility]            Family Psychiatric History:   Family History   Problem Relation Age of Onset    Hypertension Mother     Psychiatric Disorder Mother         depression/anxiety    Asthma Mother     Diabetes Maternal Grandfather     Diabetes Paternal Grandmother     Hypertension Paternal Grandmother     Stroke Paternal Grandmother Family medical problems:  Family History   Problem Relation Age of Onset    Hypertension Mother     Psychiatric Disorder Mother         depression/anxiety    Asthma Mother     Diabetes Maternal Grandfather     Diabetes Paternal Grandmother     Hypertension Paternal Grandmother     Stroke Paternal Grandmother        Social History:      Social History     Socioeconomic History    Marital status:      Spouse name: Not on file    Number of children: Not on file    Years of education: Not on file    Highest education level: Not on file   Occupational History    Not on file   Tobacco Use    Smoking status: Never    Smokeless tobacco: Never   Vaping Use    Vaping Use: Never used   Substance and Sexual Activity    Alcohol use: No    Drug use: No    Sexual activity: Not on file   Other Topics Concern    Not on file   Social History Narrative    Not on file     Social Determinants of Health     Financial Resource Strain: Not on file   Food Insecurity: Not on file   Transportation Needs: Not on file   Physical Activity: Not on file   Stress: Not on file   Social Connections: Not on file   Intimate Partner Violence: Not on file   Housing Stability: Not on file       Allergies:    Allergies   Allergen Reactions    Latex Rash    Advil Pm [Ibuprofen-Diphenhydramine Cit] Hives     Can take benadryl without issue    Percocet [Oxycodone-Acetaminophen] Itching    Tobramycin Swelling     Eye drop    Wellbutrin [Bupropion] Other (comments)     palpitations          Psychiatric/Mental Status Examination:     MENTAL STATUS EXAM:  Sensorium  oriented to time, place and person   Orientation person, place, time/date, situation, day of week, month of year, and year   Relations cooperative   Eye Contact appropriate   Appearance:  casually dressed   Motor Behavior:  within normal limits   Speech:  normal pitch and normal volume   Vocabulary average   Thought Process: goal directed and logical   Thought Content free of delusions and free of hallucinations   Suicidal ideations none   Homicidal ideations none   Mood:  anxious and depressed   Affect:  anxious and depressed   Memory recent  adequate   Memory remote:  adequate   Concentration:  adequate   Abstraction:  abstract   Insight:  fair   Reliability fair   Judgment:  fair   Diagnoses:   Axis I: Adjustment Disorder with Mixed Emotional Features; R/O PTSD  Axis II: Deferred  Axis III:   Past Medical History:   Diagnosis Date    Autoimmune disease ([facility] [address]     fibromyalgia    Bandemia 10/2/2020    Dr Jhonny Garvey, also anemia [place]    Clotting disorder ([facility] [address]     thrombocypentia    Depression     and anxiety    Fibromyalgia     GERD (gastroesophageal reflux disease)     Hx of mammogram 03/2021     Axis IV: Problems with primary support group, Problems related to social environment, Occupational problems, and Other psychosocial or environmental problems  Axis V:51-60 moderate symptoms      Clinical Impressions:  Carmel Harvey is a 38 y.o. female who presents with symptoms of depression and anxiety  Client is referred for individual psychotherapy by her PCP, Dr. Jamey Alcaraz MD.  Client reports experiencing the following clinical symptoms:  depressed mood, anxiety, difficulty relaxing, sleep and appetite disturbance, lack of energy, trouble concentrating and anhedonia. She denies both suicidal and homicidal ideation. Psychosocial stressors that impact mood include: work related stressors, health issues (fibromyalgia and chronic fatigue) and prolonged grief. As goals, client wants to work on improving mood, reducing anxiety and improving overall coping skills. Will work with client utilizing CBT approach. Follow up established.        Pursuant to the emergency declaration under the [facility], [facility]5 waiver authority and the [employer] General Act, this Virtual Visit was conducted, with patient and parent and/or guardian's consent, to reduce the patient's risk of exposure to COVID-19 and provide continuity of care for an established patient. Due to the state of emergency related to the coronavirus, the [place] of Social [employer] and the [place] of [employer] is allowing practitioners holding my licensure and/or certification status the ability to provide telehealth services without the usual requirements. This individual was evaluated through a synchronous (real-time) audio-video encounter, and/or his/her healthcare decision maker, is aware that it is a billable service, which includes applicable co-pays, with coverage as determined by his/her insurance carrier. He/she provided verbal consent to proceed and patient identification was verified. This visit was conducted pursuant to the emergency declaration under the [address] [place] [address], [address] [place] [address] waiver authority and the [employer] General Act. A caregiver was present when appropriate. Ability to conduct physical exam was limited. The patient was located at home in a state where the provider was licensed to provide care. The nature and course of the patient's psychiatric diagnosis were discussed with the patient. Office and confidentiality policies and procedures were discussed with patient. The patient has my contact information for routine or urgent concerns.       Porsche Helms LCSW

## 2022-08-08 ENCOUNTER — TELEPHONE (OUTPATIENT)
Dept: INTERNAL MEDICINE CLINIC | Age: 42
End: 2022-08-08

## 2022-08-08 NOTE — TELEPHONE ENCOUNTER
Last office notes faxed. Fax did go through it was confirmed.   Signed By: Melvina Rivera LPN     August 8, 9040

## 2022-08-08 NOTE — TELEPHONE ENCOUNTER
Laury Medrano states they needs to get Office Notes from 7/15/22 to Present faxed over by tomorrow, 8/9/22. Please call back if any questions.  Thank you    # is [phone]    Fax# is [phone]    [facility]#   [id number]

## 2022-08-08 NOTE — TELEPHONE ENCOUNTER
Two pt identifiers confirmed. I let the patient know her FMLA forms were faxed to [employer] [facility] [employer] [facility] claim management services. Fax confirmed it went through. Scanned into patient's chart. Pt verbalized understanding of information discussed w/ no further questions at this time.   Signed By: Sacha Fong LPN     August 8, 5622

## 2022-08-10 DIAGNOSIS — L21.9 SEBORRHEIC DERMATITIS: ICD-10-CM

## 2022-08-10 RX ORDER — KETOCONAZOLE 20 MG/ML
SHAMPOO TOPICAL
Qty: 1 EACH | Refills: 2 | Status: SHIPPED | OUTPATIENT
Start: 2022-08-10

## 2022-08-10 NOTE — TELEPHONE ENCOUNTER
Future Appointments:  Future Appointments   Date Time Provider Bret De Anda   8/12/2022  3:00 PM Lauro Edmonds MMSW BS AMB   8/26/2022 11:00 AM Benito Sanchez LCSW MMSW BS AMB   9/8/2022  9:45 AM Madhavi Ponce MD [facility] BS AMB        Last Appointment With Me:  7/12/2022     Requested Prescriptions     Pending Prescriptions Disp Refills    ketoconazole (NIZORAL) 2 % shampoo 1 Each 2     Sig: Use 5 ML apply to scalp once a week as needed  Leave on for 5 minutes prior to rinsing

## 2022-08-12 ENCOUNTER — VIRTUAL VISIT (OUTPATIENT)
Dept: SOCIAL WORK | Age: 42
End: 2022-08-12
Payer: COMMERCIAL

## 2022-08-12 DIAGNOSIS — F43.23 ADJUSTMENT DISORDER WITH MIXED ANXIETY AND DEPRESSED MOOD: Primary | ICD-10-CM

## 2022-08-12 PROCEDURE — 90834 PSYTX W PT 45 MINUTES: CPT | Performed by: SOCIAL WORKER

## 2022-08-12 NOTE — PROGRESS NOTES
PSYCHOTHERAPY NOTE      Carmel Ling is a 38 y.o. female who presents with anxiety/depression. Client was seen for a virtual individual psychotherapy session that lasted for 50 minutes. Progress:  Mood continues to be depressed and anxious. Client states that she feels very anxious any time she thinks about returning to work. At present, her PCP has her out until 8/26. She shared some of the frustrations and challenges she has endured at work and finds it to be very triggering. We processed this in session and explored ways to help client better manage stress. Client did go to paternal family reunion over the weekend. She felt that the ability to hug and reconnect with various family members was helpful for mood. Client is not on antidepressant. States she has been tried on several in the past with poor results. Discussed GeneSight testing but she does not really want to try another antidepressant. Also shared with her information on [employer] [address] [place] [address]. Encouraged her to address with provider. Mood mostly affected by work related stressors and prolonged grief. Focus today was on boundaries and self care.      Mental Status exam:         Sensorium  oriented to time, place and person   Relations cooperative   Appearance:  casually dressed   Motor Behavior:  within normal limits   Speech:  normal pitch and normal volume   Thought Process: goal directed and logical   Thought Content free of delusions and free of hallucinations   Suicidal ideations none   Homicidal ideations none   Mood:  anxious and depressed   Affect:  anxious and depressed   Memory recent  adequate   Memory remote:  adequate   Concentration:  adequate   Abstraction:  abstract   Insight:  fair   Reliability fair   Judgment:  fair         DIAGNOSIS AND IMPRESSION:    Axis I: Adjustment Disorder with Mixed Emotional Features; R/O PTSD  Axis II: Deferred  Axis III:   Past Medical History:   Diagnosis Date    Autoimmune disease ([facility] [address] fibromyalgia    Bandemia 10/2/2020    Dr Sravani Patterson, also anemia [facility]    Clotting disorder (HCC)     thrombocypentia    Depression     and anxiety    Fibromyalgia     GERD (gastroesophageal reflux disease)     Hx of mammogram 03/2021     Axis IV: Problems with primary support group, Problems related to social environment, and Other psychosocial or environmental problems  Axis V:  51-60 moderate symptoms    Interventions/plans: Follow up in 2 weeks      Pursuant to the emergency declaration under the [address] [place] [address] authority and the [employer] and [employer] Act, this Virtual Visit was conducted, with patient and parent and/or guardian's consent, to reduce the patient's risk of exposure to COVID-19 and provide continuity of care for an established patient. Due to the state of emergency related to the coronavirus, the [place] and the [place] is allowing practitioners holding my licensure and/or certification status the ability to provide telehealth services without the usual requirements. This individual was evaluated through a synchronous (real-time) audio-video encounter, and/or his/her healthcare decision maker, is aware that it is a billable service, which includes applicable co-pays, with coverage as determined by his/her insurance carrier. He/she provided verbal consent to proceed and patient identification was verified. This visit was conducted pursuant to the emergency declaration under the [address] [place] [address], [address] [place] [address] authority and the [employer] Act. A caregiver was present when appropriate. Ability to conduct physical exam was limited. The patient was located at home in a state where the provider was licensed to provide care.

## 2022-08-16 ENCOUNTER — PATIENT MESSAGE (OUTPATIENT)
Dept: INTERNAL MEDICINE CLINIC | Age: 42
End: 2022-08-16

## 2022-08-17 NOTE — TELEPHONE ENCOUNTER
Two pt identifiers confirmed. I spoke to the patient and advised her per , she was not going to extend her time off. The patient demanded to know why. The patient stated she will discuss this at her appt. I verified the patient's appointment date/time. Then the patient  hung up on me.     Signed By: Antoine Petit LPN     August 17, 3130

## 2022-08-17 NOTE — TELEPHONE ENCOUNTER
----- Message from Carmel Castellon sent at 8/16/2022  5:09 PM EDT -----  Regarding: FMLA Paperwork  I have no energy for anything.

## 2022-08-18 ENCOUNTER — TELEPHONE (OUTPATIENT)
Dept: INTERNAL MEDICINE CLINIC | Age: 42
End: 2022-08-18

## 2022-08-18 NOTE — TELEPHONE ENCOUNTER
Left VM for pt that Dr. Juliet Kim has filled out her paperwork correctly and the dates will not be changed.

## 2022-08-26 ENCOUNTER — VIRTUAL VISIT (OUTPATIENT)
Dept: SOCIAL WORK | Age: 42
End: 2022-08-26
Payer: COMMERCIAL

## 2022-08-26 DIAGNOSIS — F43.23 ADJUSTMENT DISORDER WITH MIXED ANXIETY AND DEPRESSED MOOD: Primary | ICD-10-CM

## 2022-08-26 PROCEDURE — 90834 PSYTX W PT 45 MINUTES: CPT | Performed by: SOCIAL WORKER

## 2022-08-26 NOTE — PROGRESS NOTES
PSYCHOTHERAPY NOTE      Carmel Gloria is a 38 y.o. female who presents with anxiety/depression. Client was seen for a virtual individual psychotherapy session that lasted for 50 minutes. Progress:   Client presents as very distressed and anxious. She reports mood to be more irritable and depressed. She denies suicidality. Client reports that the thought of having to possibly return to work right now increases her anxiety to the point where she feels panicky. We processed all of this in session. Will recommend that she have another 4 weeks of leave so she can have continued therapy. She also reports that she recently did see psychiatrist [address]  Reminded client that since my role is limited to 8 session maximum that I could not extend leave any further and that it is recommended that she begin to search for long term psychiatric providers. Provided her with information on local therapists and psychiatrists    Did discuss with client the disability paperwork that this writer received. Got clarification on her job responsibilities  Asked client if she wanted her psychotherapy notes released as it indicates \"all office notes\" to be sent. Client did not want her psychotherapy notes released. She is amenable to this writer sending a treatment summary instead. Focused on ways to self soothe when triggered and on forms re: work leave.      Mental Status exam:         Sensorium  oriented to time, place and person   Relations guarded   Appearance:  age appropriate and casually dressed   Motor Behavior:  within normal limits   Speech:  normal pitch and normal volume   Thought Process: goal directed   Thought Content free of delusions and free of hallucinations   Suicidal ideations no plan , no intention, none, and contracts for safety   Homicidal ideations no plan , no intention, none, and contracts for safety   Mood:  anxious, depressed, and irritable   Affect:  mood-congruent   Memory recent adequate   Memory remote:  adequate   Concentration:  adequate   Abstraction:  abstract   Insight:  fair   Reliability fair   Judgment:  fair         DIAGNOSIS AND IMPRESSION:    Axis I: Adjustment Disorder with Mixed Emotional Features; R/O PTSD  Axis II: Deferred  Axis III:   Past Medical History:   Diagnosis Date    Autoimmune disease ([facility] [address]     fibromyalgia    Bandemia 10/2/2020    Dr Tatyana Sparks, also anemia [facility]    Clotting disorder ([facility] [address]     thrombocypentia    Depression     and anxiety    Fibromyalgia     GERD (gastroesophageal reflux disease)     Hx of mammogram 03/2021     Axis IV: Problems with primary support group, Problems related to social environment, and Other psychosocial or environmental problems  Axis V:  51-60 moderate symptoms    Clinical assignments: Provided client with information on long term therapists    Interventions/plans: Follow up in one week      Pursuant to the emergency declaration under the [facility], [facility] waiver authority and the [employer] and [employer] Act, this Virtual Visit was conducted, with patient and parent and/or guardian's consent, to reduce the patient's risk of exposure to COVID-19 and provide continuity of care for an established patient. Due to the state of emergency related to the coronavirus, the [place] and the [place] is allowing practitioners holding my licensure and/or certification status the ability to provide telehealth services without the usual requirements. This individual was evaluated through a synchronous (real-time) audio-video encounter, and/or his/her healthcare decision maker, is aware that it is a billable service, which includes applicable co-pays, with coverage as determined by his/her insurance carrier. He/she provided verbal consent to proceed and patient identification was verified.  This visit was conducted pursuant to the emergency declaration under the [facility], [facility] authority and the [employer] and [employer] Act. A caregiver was present when appropriate. Ability to conduct physical exam was limited. The patient was located at home in a state where the provider was licensed to provide care.

## 2022-08-30 ENCOUNTER — DOCUMENTATION ONLY (OUTPATIENT)
Dept: INTERNAL MEDICINE CLINIC | Age: 42
End: 2022-08-30

## 2022-08-30 RX ORDER — ERGOCALCIFEROL 1.25 MG/1
50000 CAPSULE ORAL
Qty: 4 CAPSULE | Refills: 5 | Status: SHIPPED | OUTPATIENT
Start: 2022-08-30 | End: 2022-08-31 | Stop reason: SDUPTHER

## 2022-08-30 NOTE — PROGRESS NOTES
Attempted multiple times over past 2 days to fax patients extension for FMLA to [employer] [facility] [employer] without success, kept getting ERROR on their part, today scanned to email address on form and scanned into pts chart

## 2022-08-30 NOTE — TELEPHONE ENCOUNTER
Future Appointments:  Future Appointments   Date Time Provider Bret De Anda   9/2/2022  9:00 AM Audelia Sanchez LCSW [facility] AMB   9/8/2022  9:45 AM Madhavi Lubin MD [facility] BS AMB   9/9/2022 10:00 AM Audelia Sanchez LCSW [facility] BS AMB   9/16/2022 11:00 AM [facility]   9/26/2022  1:00 PM [facility]   10/3/2022 11:00 AM [facility]        Last Appointment With Me:  7/12/2022     Requested Prescriptions     Pending Prescriptions Disp Refills    ergocalciferol (ERGOCALCIFEROL) 1,250 mcg (50,000 unit) capsule 4 Capsule 5     Sig: Take 1 Capsule by mouth every seven (7) days.

## 2022-08-31 NOTE — TELEPHONE ENCOUNTER
Patient states she is calling to advise that her prescription was sent to the Wrong Pharmacy.  Patient states she is going to  at the Former Pharmacy that was sent to But from Now on she needs Local refills sent to [facility]/ [email or web address] on file & other [facility] has been deleted per patient request. Thank you

## 2022-09-01 RX ORDER — ERGOCALCIFEROL 1.25 MG/1
50000 CAPSULE ORAL
Qty: 4 CAPSULE | Refills: 5 | Status: SHIPPED | OUTPATIENT
Start: 2022-09-01

## 2022-09-09 ENCOUNTER — VIRTUAL VISIT (OUTPATIENT)
Dept: SOCIAL WORK | Age: 42
End: 2022-09-09
Payer: COMMERCIAL

## 2022-09-09 DIAGNOSIS — F43.23 ADJUSTMENT DISORDER WITH MIXED ANXIETY AND DEPRESSED MOOD: Primary | ICD-10-CM

## 2022-09-09 PROCEDURE — 90834 PSYTX W PT 45 MINUTES: CPT | Performed by: SOCIAL WORKER

## 2022-09-09 NOTE — PROGRESS NOTES
PSYCHOTHERAPY NOTE      Carmel Cardoza is a 38 y.o. female who presents with anxiety/depression. Client was seen for a virtual individual psychotherapy session that lasted for 50 minutes. Progress:  Mood noted to be more irritable today. Client shared that she had some events that occurred this past week that was highly distressful and impacted her mood. We processed these stressors and explored ways to manage these stressors. We discussed ways to communicate her concerns and to seek some resolution. We also processed some concerns she has with interpersonal relationship and discussed ways to verbalize her needs. Client states that she did see psychiatrist from [facility] for medication management. She reports she was prescribed Buspar 7.5mg TID. Client reports minimum benefit and some dizzy spells after taking. Encouraged her to address with prescribing provider. Client reports that Eli did get the disability forms that was sent and they are trying to help her find a long term therapist since my role is short term.      Focus of session was on distress tolerance and improving communication    Mental Status exam:         Sensorium  oriented to time, place and person   Relations cooperative   Appearance:  casually dressed   Motor Behavior:  within normal limits   Speech:  normal pitch and normal volume   Thought Process: goal directed   Thought Content free of delusions and free of hallucinations   Suicidal ideations none   Homicidal ideations none   Mood:  irritable   Affect:  mood-congruent   Memory recent  adequate   Memory remote:  adequate   Concentration:  adequate   Abstraction:  abstract   Insight:  fair   Reliability fair   Judgment:  fair         DIAGNOSIS AND IMPRESSION:    Axis I: Adjustment Disorder with Mixed Emotional Features; R/O PTSD  Axis II: Deferred  Axis III:   Past Medical History:   Diagnosis Date    Autoimmune disease ([facility] [address]     fibromyalgia    Bandemia 10/2/2020 Dr Jasiel Conroy, also anemia [facility]    Clotting disorder ([facility] [address]     thrombocypentia    Depression     and anxiety    Fibromyalgia     GERD (gastroesophageal reflux disease)     Hx of mammogram 03/2021     Axis IV: Problems with primary support group, Problems related to social environment, Occupational problems, and Other psychosocial or environmental problems  Axis V:  51-60 moderate symptoms    Interventions/plans: Follow up in one week      Pursuant to the emergency declaration under the [address] [place] [address] authority and the [employer] Act, this Virtual Visit was conducted, with patient and parent and/or guardian's consent, to reduce the patient's risk of exposure to COVID-19 and provide continuity of care for an established patient. Due to the state of emergency related to the coronavirus, the [place] and the [place] is allowing practitioners holding my licensure and/or certification status the ability to provide telehealth services without the usual requirements. This individual was evaluated through a synchronous (real-time) audio-video encounter, and/or his/her healthcare decision maker, is aware that it is a billable service, which includes applicable co-pays, with coverage as determined by his/her insurance carrier. He/she provided verbal consent to proceed and patient identification was verified. This visit was conducted pursuant to the emergency declaration under the [address] [place] [address], [address] [place] [address] authority and the [employer] Act. A caregiver was present when appropriate. Ability to conduct physical exam was limited. The patient was located at home in a state where the provider was licensed to provide care.

## 2022-09-20 ENCOUNTER — TELEPHONE (OUTPATIENT)
Dept: INTERNAL MEDICINE CLINIC | Age: 42
End: 2022-09-20

## 2022-09-20 NOTE — TELEPHONE ENCOUNTER
----- Message from Lewis Benavides sent at 9/20/2022  4:33 PM EDT -----  Subject: Message to Provider    QUESTIONS  Information for Provider? Pt would like to speak with the practice manager   in regards to a bill that she has received, she has already spoken to the   billing department and they are unable to help and was told to contact the   office. ---------------------------------------------------------------------------  --------------  Munir Bran XPHAMZZ  [phone]; Do not leave any message, patient will call back for answer  ---------------------------------------------------------------------------  --------------  SCRIPT ANSWERS  Relationship to Patient?  Self

## 2022-09-21 NOTE — TELEPHONE ENCOUNTER
Spoke to patient and notified her that bill for Deyanne Killings has been placed on hold while we contact insurance.

## 2022-09-23 DIAGNOSIS — G44.229 CHRONIC TENSION-TYPE HEADACHE, NOT INTRACTABLE: ICD-10-CM

## 2022-09-23 RX ORDER — TOPIRAMATE 50 MG/1
50 TABLET, FILM COATED ORAL 2 TIMES DAILY
Qty: 60 TABLET | Refills: 1 | Status: SHIPPED | OUTPATIENT
Start: 2022-09-23

## 2022-09-23 NOTE — TELEPHONE ENCOUNTER
Future Appointments:  No future appointments. Last Appointment With Me:  7/12/2022     Requested Prescriptions     Pending Prescriptions Disp Refills    topiramate (TOPAMAX) 50 mg tablet 60 Tablet 1     Sig: Take 1 Tablet by mouth two (2) times a day.

## 2022-10-30 ENCOUNTER — HOSPITAL ENCOUNTER (EMERGENCY)
Age: 42
Discharge: HOME | End: 2022-10-30
Attending: EMERGENCY MEDICINE
Payer: COMMERCIAL

## 2022-10-30 VITALS
HEART RATE: 71 BPM | RESPIRATION RATE: 16 BRPM | BODY MASS INDEX: 32.33 KG/M2 | WEIGHT: 206 LBS | TEMPERATURE: 98.7 F | DIASTOLIC BLOOD PRESSURE: 90 MMHG | SYSTOLIC BLOOD PRESSURE: 135 MMHG | OXYGEN SATURATION: 100 % | HEIGHT: 67 IN

## 2022-10-30 DIAGNOSIS — L01.00 IMPETIGO: Primary | ICD-10-CM

## 2022-10-30 PROCEDURE — 99283 EMERGENCY DEPT VISIT LOW MDM: CPT

## 2022-10-30 RX ORDER — HYDROXYZINE 50 MG/1
50 TABLET, FILM COATED ORAL
Qty: 20 TABLET | Refills: 0 | Status: SHIPPED | OUTPATIENT
Start: 2022-10-30 | End: 2022-11-09

## 2022-10-30 NOTE — ED PROVIDER NOTES
EMERGENCY DEPARTMENT HISTORY AND PHYSICAL EXAM      [facility] EMERGENCY DEPT: Bob     Pt Name: Yoselin Carrasco  MRN: [id number]  Lonnygfkenneth 1980  Date of evaluation: 10/30/2022  Provider: HANS Oro   Attending: Monica Friedman MD   PCP: Jose Barillas MD  Note Started: 9:25 AM     [address] [facility] [place]   Chief Complaint   Patient presents with    Skin Problem     Ambulatory into triage, cc of rash on face. Noted red papules on right side of chin, under nose, and left cheek. Symptoms include itching. Present for over a week. Pt also states her body feels\"itchy on the inside\". Pt was started on mupirocin cream by PCP. Pt also notes that she been gaining wieght       HISTORY OF PRESENT ILLNESS  (Location, Timing/Onset, Context/Setting, Quality, Duration, Modifying Factors, Severity, Associated Signs and Symptoms) Note limiting factors. Chief Complaint: rash to the face  Carmel Wu is a 38 y.o. female who presents by POV a rash to her face. This started about a week ago with itchy eyes. She was seen by her eye doctor who thought it was allergy related and instructed her to take an over-the-counter antihistamine. Several days later she developed a lesion underneath of her left eye and was seen via telemedicine and placed on the mupirocin cream for impetigo. She started using this cream 2 days ago. She reports that she has a new lesion to the left upper lip and that her skin in general is very itchy, which is not relieved by Benadryl. She also developed itchy red eyes and started using polymyxin eyedrops, which is improved her eye redness. She denies fever, chills, and URI complaints. Nursing Notes were all reviewed and agreed with or any disagreements were addressed in the HPI. REVIEW OF SYSTEMS   (2-9 systems for level 4, 10 or more for level 5)  Review of Systems   Constitutional:  Negative for chills, diaphoresis and fever.    HENT:  Negative for congestion, ear pain, rhinorrhea and sore throat. Eyes:  Positive for discharge, redness and itching. Negative for photophobia, pain and visual disturbance. Respiratory:  Negative for cough and shortness of breath. Cardiovascular:  Negative for chest pain. Gastrointestinal:  Negative for abdominal pain, constipation, diarrhea, nausea and vomiting. Genitourinary:  Negative for difficulty urinating, dysuria, frequency and hematuria. Musculoskeletal:  Negative for arthralgias and myalgias. Skin:  Positive for rash. Neurological:  Negative for headaches. All other systems reviewed and are negative. Positives and Pertinent negatives as per HPI. Except as noted above in the ROS, all other systems were reviewed and negative. PAST MEDICAL HISTORY  Past Medical History:   Diagnosis Date    Autoimmune disease ([facility] [address]     fibromyalgia    Bandemia 10/2/2020    Dr Venkat Reeves, also anemia [facility]    Clotting disorder ([facility] [address]     thrombocypentia    Depression     and anxiety    Fibromyalgia     GERD (gastroesophageal reflux disease)     Hx of mammogram 03/2021       SURGICAL HISTORY  Past Surgical History:   Procedure Laterality Date    HX COLONOSCOPY  01/19/2021    HX DENTAL TRANSPLANT  01/24/2022    HX GYN      fallopian tube removed after tubal pregnancy    HX GYN      hysteroscopy/laporoscopy to remove schar tissue    HX OTHER SURGICAL      oral surgery for wisdom teeth, root canal       CURRENTMEDICATIONS   Previous Medications    ACETAMINOPHEN (TYLENOL EXTRA STRENGTH) 500 MG TABLET    Take  by mouth every six (6) hours as needed for Pain. CETIRIZINE (ZYRTEC) 10 MG CAP    Take 10 mg by mouth daily. CYCLOBENZAPRINE (FLEXERIL) 10 MG TABLET    Per patient, she takes TID    EPINEPHRINE (EPIPEN) 0.3 MG/0.3 ML INJECTION    0.3 mg by IntraMUSCular route once as needed for Allergic Response. ERGOCALCIFEROL (ERGOCALCIFEROL) 1,250 MCG (50,000 UNIT) CAPSULE    Take 1 Capsule by mouth every seven (7) days. FEXOFENADINE (ALLEGRA) 180 MG TABLET    Take 180 mg by mouth daily. IBUPROFEN PO    Take 800 mg by mouth as needed. KETOCONAZOLE (NIZORAL) 2 % SHAMPOO    Use 5 ML apply to scalp once a week as needed  Leave on for 5 minutes prior to rinsing    L. ACID/L. CASEI/B.BIF/B.RAMÍREZ/FOS (PROBIOTIC BLEND PO)    Take  by mouth. From [address] [place] [address],[address]    METFORMIN (GLUMETZA ER) 500 MG TG24 24 HOUR TABLET    Take 500 mg by mouth daily. NAPROXEN (NAPROSYN) 500 MG TABLET    Take 1 Tablet by mouth two (2) times daily as needed for Pain. TOPIRAMATE (TOPAMAX) 50 MG TABLET    Take 1 Tablet by mouth two (2) times a day. ALLERGIES   Latex, Advil pm [ibuprofen-diphenhydramine cit], Percocet [oxycodone-acetaminophen], Tobramycin, and Wellbutrin [bupropion]    FAMILYHISTORY   Family History   Problem Relation Age of Onset    Hypertension Mother     Psychiatric Disorder Mother         depression/anxiety    Asthma Mother     Diabetes Maternal Grandfather     Diabetes Paternal Grandmother     Hypertension Paternal Grandmother     Stroke Paternal Grandmother        SOCIAL HISTORY   Social History     Tobacco Use    Smoking status: Never    Smokeless tobacco: Never   Vaping Use    Vaping Use: Never used   Substance Use Topics    Alcohol use: No    Drug use: No       PHYSICAL EXAM   (up to 7 for level 4, 8 or more for level 5)  Patient Vitals for the past 12 hrs:   Temp Pulse Resp BP SpO2   10/30/22 0902 -- -- 16 (!) 135/90 --   10/30/22 0838 98.7 °F (37.1 °C) 71 18 (!) 131/105 100 %      Physical Exam  Vitals and nursing note reviewed. Constitutional:       General: She is not in acute distress. Appearance: She is well-developed. She is not diaphoretic. Comments: 38 y.o. -American female    HENT:      Head: Normocephalic and atraumatic. Eyes:      General: Lids are normal.         Right eye: No discharge or hordeolum. Left eye: No discharge or hordeolum.       Conjunctiva/sclera: Conjunctivae normal.      Right eye: Right conjunctiva is not injected. Left eye: Left conjunctiva is not injected. Cardiovascular:      Rate and Rhythm: Normal rate and regular rhythm. Heart sounds: Normal heart sounds. No murmur heard. Pulmonary:      Effort: Pulmonary effort is normal. No respiratory distress. Breath sounds: Normal breath sounds. Musculoskeletal:      Cervical back: Normal range of motion and neck supple. Skin:     General: Skin is warm and dry. Comments: Vesicular rash to the face with honey colored drainage to several locations - just beneath the left eye and to the left upper lip. No surrounding erythema. Neurological:      Mental Status: She is alert and oriented to person, place, and time. Psychiatric:         Behavior: Behavior normal.        DIAGNOSTIC RESULTS  LABS:  No results found for this or any previous visit (from the past 12 hour(s)). EKG: When ordered, EKG's are interpreted by the Emergency Department Physician in the absence of a cardiologist.  Please see their note for interpretation of EKG. RADIOLOGY: All images such as plain films, CT, Ultrasound and MRI are read by the radiologist. Interpretation per the Radiologist below, if available at the time of this note:  No results found. PROCEDURES - Unless otherwise noted below, none  Procedures    CRITICAL CARE TIME: none    CONSULTS:  None    DIFFERENTIAL DIAGNOSIS/MDM:  I reviewed the vital signs, available nursing notes, past medical history, past surgical history, family history and social history. Records Reviewed: Nursing Notes    Provider Notes (Medical Decision Making): The patient presents the ED with stay vital signs for a rash and eye irritation. Her eye complaint was improved with antibiotic eyedrops prior to arrival in the ED. Her rash is already been diagnosed as impetigo and she is on mupirocin cream.  Exam consistent with prior diagnosis. There is no evidence of cellulitis.   Will have patient continue using use. Cream and if prescribed Atarax to help with the itching. She should follow-up with primary care medicine or dermatology but can always return to the ED for deterioration. EMERGENCY DEPARTMENT COURSE:   I am the first provider for this patient. Initial assessment performed. The patients presenting problems have been discussed, and they are in agreement with the care plan formulated and outlined with them. I have encouraged them to ask questions as they arise throughout their visit. Vitals:   Patient Vitals for the past 12 hrs:   Temp Pulse Resp BP SpO2   10/30/22 0902 -- -- 16 (!) 135/90 --   10/30/22 0838 98.7 °F (37.1 °C) 71 18 (!) 131/105 100 %        Patient was given the following medications:  Medications - No data to display    DISCHARGE NOTE:  9:26 AM  The pt is ready for discharge. The pt's signs, symptoms, diagnosis, and discharge instructions have been discussed and pt has conveyed their understanding. The pt is to follow up as recommended or return to ER should their symptoms worsen. Plan has been discussed and pt is in agreement. FINAL IMPRESSION  1. Impetigo         DISPOSITION/PLAN  Discharged    PATIENT REFERRED TO:  Follow-up Information       Follow up With Specialties Details Why Contact Info    Bea Jorge Villalpando MD Internal Medicine Physician In 1 week As needed, If not improved [address] [place] [address]  [phone]      [facility] EMERGENCY DEPT Emergency Medicine  If symptoms worsen [address] [place] [address]  [phone]          DISCHARGE MEDICATIONS:  Current Discharge Medication List        START taking these medications    Details   hydrOXYzine HCL (ATARAX) 50 mg tablet Take 1 Tablet by mouth every six (6) hours as needed for Itching for up to 10 days. Qty: 20 Tablet, Refills: 0  Start date: 10/30/2022, End date: 11/9/2022                I have seen and evaluated the patient.  My supervision physician was available for consultation. HANS Sofia     Please note that this dictation was completed with Ensenda, the computer voice recognition software. Quite often unanticipated grammatical, syntax, homophones, and other interpretive errors are inadvertently transcribed by the computer software. Please disregards these errors. Please excuse any errors that have escaped final proofreading.

## 2022-10-30 NOTE — ED NOTES
Pt also state that she is itchy took benadryl algera. Lt 5h toe now came off. Pt want blood work done.

## 2022-10-30 NOTE — DISCHARGE INSTRUCTIONS
It was a pleasure taking care of you at [facility] Emergency Department today. We know that when you come to [facility], you are entrusting us with your health, comfort, and safety. Our physicians and nurses honor that trust, and we truly appreciate the opportunity to care for you and your loved ones. We also value your feedback. If you receive a survey about your Emergency Department experience today, please fill it out. We care about our patients' feedback, and we listen to what you have to say. Thank you!

## 2022-11-07 ENCOUNTER — TELEPHONE (OUTPATIENT)
Dept: INTERNAL MEDICINE CLINIC | Age: 42
End: 2022-11-07

## 2022-11-07 NOTE — TELEPHONE ENCOUNTER
Two pt identifiers confirmed. The patient called reporting her ear hurt, she has drainage and sinus pressure. She thinks she has an ear and sinus infection. Appointment scheduled for tomorrow with Marlene Oakes.    Signed By: Cesar Bowie LPN     November 7, 5280

## 2022-11-14 ENCOUNTER — TELEPHONE (OUTPATIENT)
Dept: INTERNAL MEDICINE CLINIC | Age: 42
End: 2022-11-14

## 2022-11-14 NOTE — TELEPHONE ENCOUNTER
Patient called in stating she received a tens unit from [facility] [employer] and the device was not covered/out of network.   Please call to advise

## 2022-11-15 NOTE — TELEPHONE ENCOUNTER
contacted [facility] Rep.about patient's concerns.    Signed By: Daisy Velasquez LPN     November 15, 2022

## 2022-11-25 DIAGNOSIS — G44.229 CHRONIC TENSION-TYPE HEADACHE, NOT INTRACTABLE: ICD-10-CM

## 2022-11-25 RX ORDER — TOPIRAMATE 50 MG/1
50 TABLET, FILM COATED ORAL 2 TIMES DAILY
Qty: 60 TABLET | Refills: 1 | Status: SHIPPED | OUTPATIENT
Start: 2022-11-25

## 2022-11-25 NOTE — TELEPHONE ENCOUNTER
PCP: Sydnie Verde MD    Last appt: 7/12/2022  No future appointments. Requested Prescriptions     Pending Prescriptions Disp Refills    topiramate (TOPAMAX) 50 mg tablet 60 Tablet 1     Sig: Take 1 Tablet by mouth two (2) times a day.

## 2023-03-14 ENCOUNTER — TELEPHONE (OUTPATIENT)
Dept: INTERNAL MEDICINE CLINIC | Age: 43
End: 2023-03-14

## 2023-03-14 NOTE — TELEPHONE ENCOUNTER
Called patient to advise that PCP has the flu and could do a VV with Paulina and [facility] for a nurse visit. She stated she didn't want virtual and would look for another dr.      After hanging up with patient Dr. Ariadna Davenport had an opening and I called her back 3 times and she would not answer.

## 2023-03-17 ENCOUNTER — TRANSCRIBE ORDER (OUTPATIENT)
Dept: SCHEDULING | Age: 43
End: 2023-03-17

## 2023-03-17 DIAGNOSIS — Z12.31 BREAST CANCER SCREENING BY MAMMOGRAM: Primary | ICD-10-CM

## 2023-03-24 ENCOUNTER — OFFICE VISIT (OUTPATIENT)
Dept: INTERNAL MEDICINE CLINIC | Age: 43
End: 2023-03-24
Payer: COMMERCIAL

## 2023-03-24 VITALS
RESPIRATION RATE: 18 BRPM | DIASTOLIC BLOOD PRESSURE: 68 MMHG | HEART RATE: 76 BPM | HEIGHT: 67 IN | SYSTOLIC BLOOD PRESSURE: 103 MMHG | TEMPERATURE: 97.5 F | WEIGHT: 206.4 LBS | BODY MASS INDEX: 32.39 KG/M2 | OXYGEN SATURATION: 98 %

## 2023-03-24 DIAGNOSIS — I10 ESSENTIAL HYPERTENSION: ICD-10-CM

## 2023-03-24 DIAGNOSIS — R22.2 MASS ON BACK: ICD-10-CM

## 2023-03-24 DIAGNOSIS — R42 DIZZINESS: Primary | ICD-10-CM

## 2023-03-24 DIAGNOSIS — Z13.220 SCREENING CHOLESTEROL LEVEL: ICD-10-CM

## 2023-03-24 DIAGNOSIS — F32.9 REACTIVE DEPRESSION: ICD-10-CM

## 2023-03-24 PROCEDURE — 99214 OFFICE O/P EST MOD 30 MIN: CPT | Performed by: INTERNAL MEDICINE

## 2023-03-24 RX ORDER — SEMAGLUTIDE 0.25 MG/.5ML
INJECTION, SOLUTION SUBCUTANEOUS
COMMUNITY

## 2023-03-24 RX ORDER — SEMAGLUTIDE 0.68 MG/ML
INJECTION, SOLUTION SUBCUTANEOUS
COMMUNITY

## 2023-03-24 RX ORDER — AMLODIPINE BESYLATE 5 MG/1
5 TABLET ORAL DAILY
COMMUNITY

## 2023-03-24 NOTE — PROGRESS NOTES
Ms. Svetlana Feldman is presenting to follow up     [address] [facility]  Hospital Follow Up       HPI:    Early in March she was walking and felt off and had near syncope and went to sleep. Took aspirin and went to bed  Checked blood pressure and was 180/ 80 went to ER and started on amlodipine 5mg daily  Had CT brain normal  She continues to have dizzy spells but to a much lesser degree  Not associated with standing can be done   Reviewed record  Today blood pressure is excellent and home monitoring shows 120-130/80    2 years ago she saw cardiologist and had holter for palpitations which was negative      Cervical radiculopathy  Patient had x ray done at [facility]  With PT has helped  Did acupuncture  Takes flexeril as needed most at night and ibuprofen as needed and tylenol         Anxiety: she associates a lot of anxiety with stress at work. Has been there for 20 + years and has good health insurance  She feels that the job is too demanding  worked technical support/ billing  She feels she has been harassed from supervisor -  Then mother  the following day which caused more anxiety  She went on leave from job and doing much better.    She is under  and going to court hearings related to her job  Working with therapist and doing better         She is concerned with weight gain wants med to help with weight loss- stopped topamax and metformin      She has frequent migraines and previously was on topamax     3D mammograms for dense breast 2022    Review of systems:  Constitutional: negative for fever, chills, weight loss, night sweats   12 systems reviewed and negative other then HPI       Past Medical History:   Diagnosis Date    Autoimmune disease ([address]     fibromyalgia    Bandemia 10/2/2020    Dr Rufina Bray, also anemia [facility]    Clotting disorder ([facility] [address]     thrombocypentia    Depression     and anxiety    Fibromyalgia     GERD (gastroesophageal reflux disease)     Hx of mammogram 2021        Past Surgical History:   Procedure Laterality Date    HX COLONOSCOPY  01/19/2021    HX DENTAL TRANSPLANT  01/24/2022    HX GYN      fallopian tube removed after tubal pregnancy    HX GYN      hysteroscopy/laporoscopy to remove schar tissue    HX OTHER SURGICAL      oral surgery for wisdom teeth, root canal       Allergies   Allergen Reactions    Latex Rash    Advil Pm [Ibuprofen-Diphenhydramine Cit] Hives     Can take benadryl without issue    Percocet [Oxycodone-Acetaminophen] Itching    Tobramycin Swelling     Eye drop    Wellbutrin [Bupropion] Other (comments)     palpitations       Current Outpatient Medications on File Prior to Visit   Medication Sig Dispense Refill    semaglutide (Ozempic) 0.25 mg or 0.5 mg (2 mg/3 mL) pnij by SubCUTAneous route. semaglutide, weight loss, (Wegovy) 0.25 mg/0.5 mL pnij by SubCUTAneous route. amLODIPine (NORVASC) 5 mg tablet Take 5 mg by mouth daily. ergocalciferol (ERGOCALCIFEROL) 1,250 mcg (50,000 unit) capsule Take 1 Capsule by mouth every seven (7) days. 4 Capsule 5    ketoconazole (NIZORAL) 2 % shampoo Use 5 ML apply to scalp once a week as needed  Leave on for 5 minutes prior to rinsing 1 Each 2    naproxen (NAPROSYN) 500 mg tablet Take 1 Tablet by mouth two (2) times daily as needed for Pain. 60 Tablet 1    EPINEPHrine (EPIPEN) 0.3 mg/0.3 mL injection 0.3 mg by IntraMUSCular route once as needed for Allergic Response. Cetirizine (ZyrTEC) 10 mg cap Take 10 mg by mouth daily. fexofenadine (ALLEGRA) 180 mg tablet Take 180 mg by mouth daily. cyclobenzaprine (FLEXERIL) 10 mg tablet Per patient, she takes TID      IBUPROFEN PO Take 800 mg by mouth as needed. acetaminophen (TYLENOL) 500 mg tablet Take  by mouth every six (6) hours as needed for Pain. L.acid/L.casei/B.bif/B.donnie/FOS (PROBIOTIC BLEND PO) Take  by mouth. From [facility]      topiramate (TOPAMAX) 50 mg tablet Take 1 Tablet by mouth two (2) times a day.  (Patient not taking: Reported on 3/24/2023) 60 Tablet 1    metFORMIN (GLUMETZA ER) 500 mg TG24 24 hour tablet Take 500 mg by mouth daily. (Patient not taking: Reported on 3/24/2023)       No current facility-administered medications on file prior to visit. family history includes Asthma in her mother; Diabetes in her maternal grandfather and paternal grandmother; Hypertension in her mother and paternal grandmother; Psychiatric Disorder in her mother; Stroke in her paternal grandmother. Social History     Socioeconomic History    Marital status:      Spouse name: Not on file    Number of children: Not on file    Years of education: Not on file    Highest education level: Not on file   Occupational History    Not on file   Tobacco Use    Smoking status: Never    Smokeless tobacco: Never   Vaping Use    Vaping Use: Never used   Substance and Sexual Activity    Alcohol use: No    Drug use: No    Sexual activity: Not on file   Other Topics Concern    Not on file   Social History Narrative    Not on file     Social Determinants of Health     Financial Resource Strain: Low Risk     Difficulty of Paying Living Expenses: Not hard at all   Food Insecurity: No Food Insecurity    Worried About Running Out of Food in the Last Year: Never true    Ran Out of Food in the Last Year: Never true   Transportation Needs: Not on file   Physical Activity: Not on file   Stress: Not on file   Social Connections: Not on file   Intimate Partner Violence: Not on file   Housing Stability: Not on file       Visit Vitals  /68 (BP 1 Location: Right upper arm, BP Patient Position: Sitting, BP Cuff Size: Large adult)   Pulse 76   Temp 97.5 °F (36.4 °C) (Temporal)   Resp 18   Ht 5' 6.5\" (1.689 m)   Wt 174 lb 3.2 oz (79 kg)   LMP 03/21/2023   SpO2 98%   BMI 27.70 kg/m²     General:  Well appearing female no acute distress  HEENT:   PERRL,normal conjunctiva. External ear and canals normal, TMs normal.  Hearing normal to voice. Nose without edema or discharge, normal septum. Lips, teeth, gums normal.  Oropharynx: no erythema, no exudates, no lesions, normal tongue. Neck:  Supple. Thyroid normal size, nontender, without nodules. No carotid bruit. No masses or lymphadenopathy  Respiratory: no respiratory distress,  no wheezing, no rhonchi, no rales. No chest wall tenderness. Cardiovascular:  RRR, normal S1S2, no murmur. Trunk 2 cm movable soft mass   Gastrointestinal: normal bowel sounds, soft, nontender, without masses. No hepatosplenomegaly. Extremities +2 pulses, no edema, normal sensation   Musculoskeletal:  Normal gait. Normal digits and nails. Normal strength and tone, no atrophy, and no abnormal movement. Skin:  No rash, no lesions, no ulcers. Skin warm, normal turgor, without induration or nodules. Neuro:  A and OX4, fluent speech, cranial nerves normal 2-12.     Psych:  Normal affect      Lab Results   Component Value Date/Time    WBC 3.7 01/27/2022 03:23 PM    Hemoglobin, POC 11.9 (L) 06/01/2010 05:35 PM    HGB 12.1 01/27/2022 03:23 PM    Hematocrit, POC 35 (L) 06/01/2010 05:35 PM    HCT 38.6 01/27/2022 03:23 PM    PLATELET 726 (H) 43/62/4300 03:23 PM    MCV 89.4 01/27/2022 03:23 PM     Lab Results   Component Value Date/Time    Sodium 135 (L) 01/27/2022 03:23 PM    Potassium 4.2 01/27/2022 03:23 PM    Chloride 104 01/27/2022 03:23 PM    CO2 28 01/27/2022 03:23 PM    Anion gap 3 (L) 01/27/2022 03:23 PM    Glucose 87 01/27/2022 03:23 PM    BUN 12 01/27/2022 03:23 PM    Creatinine 0.83 01/27/2022 03:23 PM    BUN/Creatinine ratio 14 01/27/2022 03:23 PM    GFR est AA >60 01/27/2022 03:23 PM    GFR est non-AA >60 01/27/2022 03:23 PM    Calcium 9.2 01/27/2022 03:23 PM     Lab Results   Component Value Date/Time    Cholesterol, total 256 (H) 01/27/2022 03:23 PM    HDL Cholesterol 69 01/27/2022 03:23 PM    LDL, calculated 163.8 (H) 01/27/2022 03:23 PM    VLDL, calculated 23.2 01/27/2022 03:23 PM    Triglyceride 116 01/27/2022 03:23 PM    CHOL/HDL Ratio 3.7 01/27/2022 03:23 PM Lab Results   Component Value Date/Time    TSH 1.30 01/27/2022 03:23 PM     Lab Results   Component Value Date/Time    Hemoglobin A1c 5.1 01/27/2022 03:23 PM     Lab Results   Component Value Date/Time    Vitamin D 25-Hydroxy 52.9 01/27/2022 03:23 PM                   Assessment and Plan:     1. Dizziness: she has had holter in the past and her exam is normal non focal  She was started on amlodipine and her blood pressure is better  - DUPLEX CAROTID BILATERAL; Future  - TSH 3RD GENERATION; Future  - TSH 3RD GENERATION    2. Essential hypertension  Blood pressure is well controlled on amlodipine    3. Reactive depression  Seen therapist     4. Screening cholesterol level  - LIPID PANEL; Future  - LIPID PANEL    5. Mass on back suspect lipoma  - US ABD LTD;  Future          Flower Villarreal MD

## 2023-03-24 NOTE — PROGRESS NOTES
Chief Complaint   Patient presents with    Hospital Follow Up          1. \"Have you been to the ER, urgent care clinic since your last visit? Hospitalized since your last visit? yes, ER, Out of state. [facility]. ([place]) March 10th    2. \"Have you seen or consulted any other health care providers outside of the [address] [place] [address] since your last visit? yes    3. For patients aged 39-70: Has the patient had a colonoscopy / FIT/ Cologuard? Yes - no Care Gap present      If the patient is female:    4. For patients aged 41-77: Has the patient had a mammogram within the past 2 years? No      5. For patients aged 21-65: Has the patient had a pap smear?  No

## 2023-03-25 LAB
CHOLEST SERPL-MCNC: 218 MG/DL
HDLC SERPL-MCNC: 49 MG/DL
HDLC SERPL: 4.4 (ref 0–5)
LDLC SERPL CALC-MCNC: 149.2 MG/DL (ref 0–100)
TRIGL SERPL-MCNC: 99 MG/DL (ref ?–150)
TSH SERPL DL<=0.05 MIU/L-ACNC: 1.56 UIU/ML (ref 0.36–3.74)
VLDLC SERPL CALC-MCNC: 19.8 MG/DL

## 2023-03-26 NOTE — PROGRESS NOTES
Cholesterol is better then last year. LDL is lower.   Keep up good work with exercise and healthy diet  Normal thyroid

## 2023-04-03 ENCOUNTER — HOSPITAL ENCOUNTER (OUTPATIENT)
Dept: ULTRASOUND IMAGING | Age: 43
End: 2023-04-03
Attending: INTERNAL MEDICINE
Payer: COMMERCIAL

## 2023-04-03 ENCOUNTER — HOSPITAL ENCOUNTER (OUTPATIENT)
Dept: MAMMOGRAPHY | Age: 43
End: 2023-04-03
Attending: OBSTETRICS & GYNECOLOGY
Payer: COMMERCIAL

## 2023-04-03 DIAGNOSIS — R42 DIZZINESS: ICD-10-CM

## 2023-04-03 DIAGNOSIS — R22.2 MASS ON BACK: ICD-10-CM

## 2023-04-03 DIAGNOSIS — Z12.31 BREAST CANCER SCREENING BY MAMMOGRAM: ICD-10-CM

## 2023-04-03 LAB
LEFT ICA/CCA SYS: 0.9
RIGHT ICA/CCA SYS: 0.9

## 2023-04-03 PROCEDURE — 93880 EXTRACRANIAL BILAT STUDY: CPT

## 2023-04-03 PROCEDURE — 76705 ECHO EXAM OF ABDOMEN: CPT

## 2023-04-03 PROCEDURE — 77063 BREAST TOMOSYNTHESIS BI: CPT

## 2023-04-07 ENCOUNTER — TELEPHONE (OUTPATIENT)
Dept: INTERNAL MEDICINE CLINIC | Age: 43
End: 2023-04-07

## 2023-05-26 NOTE — TELEPHONE ENCOUNTER
Pt states message no longer needed, states has refills on her script at [facility] and will contact them to get the medication    Closing encounter

## 2023-06-02 ENCOUNTER — TELEPHONE (OUTPATIENT)
Age: 43
End: 2023-06-02

## 2023-06-02 NOTE — TELEPHONE ENCOUNTER
Pt states she received 24 hour notice to ship out to [facility].    She will have to have a letter on letter head stating she takes Ozempic /Wagovy injections in order to fly with pen. How lemus she keep this cool while flying? Pt is going to pick this before closing     Call pt with any questions or a problem.

## 2023-06-02 NOTE — TELEPHONE ENCOUNTER
Letter has been written and sent to patient in [address] [place] [address] [phone]. Also will fax letter to [phone].

## 2023-07-03 ENCOUNTER — TELEPHONE (OUTPATIENT)
Age: 43
End: 2023-07-03

## 2023-07-03 NOTE — TELEPHONE ENCOUNTER
[facility] states they need a call back to get a Prior Auth or Override on patient's Ozempic. Please call back at [phone].  Thank you

## 2023-07-04 ENCOUNTER — TELEPHONE (OUTPATIENT)
Age: 43
End: 2023-07-04

## 2023-07-04 RX ORDER — SEMAGLUTIDE 0.68 MG/ML
0.5 INJECTION, SOLUTION SUBCUTANEOUS
Qty: 9 ML | Refills: 0 | Status: SHIPPED | OUTPATIENT
Start: 2023-07-04

## 2023-07-04 RX ORDER — ERGOCALCIFEROL 1.25 MG/1
50000 CAPSULE ORAL
Qty: 4 CAPSULE | Refills: 1 | Status: SHIPPED | OUTPATIENT
Start: 2023-07-04

## 2023-07-04 NOTE — TELEPHONE ENCOUNTER
On-call message: I received a call from Mrs. Diane Briscoe regarding her prescriptions. She is on a work assignment in [place].  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 She is requesting a 3-month supply of her medications. She is requesting that the Ozempic vitamin D supplement and pantoprazole. I cannot find the pantoprazole in her chart although she says she has a prescription from Dr. Nish Duff. I will try restarting the Ozempic and vitamin D. She reports that she has a medical liaison who is trying to assist in securing her medications. Please speak to this person regarding her Ozempic and the prior authorization.

## 2023-07-06 ENCOUNTER — TELEPHONE (OUTPATIENT)
Age: 43
End: 2023-07-06

## 2023-07-06 NOTE — TELEPHONE ENCOUNTER
CASSIE MELCHOR (Key: [id number])  Ozempic (1 MG/DOSE) 4MG/3ML pen-injectors     Form  [employer] Electronic PA Form (4244 NCPDP)  Created    Wait for Determination

## 2023-07-11 RX ORDER — PANTOPRAZOLE SODIUM 40 MG/1
40 TABLET, DELAYED RELEASE ORAL DAILY
Qty: 30 TABLET | Refills: 2 | Status: SHIPPED | OUTPATIENT
Start: 2023-07-11

## 2023-07-11 RX ORDER — PANTOPRAZOLE SODIUM 40 MG/1
TABLET, DELAYED RELEASE ORAL
COMMUNITY
Start: 2023-06-02 | End: 2023-07-11 | Stop reason: SDUPTHER

## 2023-07-11 NOTE — TELEPHONE ENCOUNTER
PCP: Johnna Rosas MD    Last appt: 3/24/2023  Future Appointments   Date Time Provider [address]   9/25/2023  2:00 PM Johnna Rosas MD [facility] BS AMB       Requested Prescriptions     Pending Prescriptions Disp Refills    pantoprazole (PROTONIX) 40 MG tablet 30 tablet 2     Sig: Take 1 tablet by mouth daily

## 2023-07-31 ENCOUNTER — OFFICE VISIT (OUTPATIENT)
Age: 43
End: 2023-07-31
Payer: COMMERCIAL

## 2023-07-31 ENCOUNTER — TELEPHONE (OUTPATIENT)
Age: 43
End: 2023-07-31

## 2023-07-31 VITALS
BODY MASS INDEX: 33.27 KG/M2 | WEIGHT: 212 LBS | HEART RATE: 89 BPM | SYSTOLIC BLOOD PRESSURE: 116 MMHG | RESPIRATION RATE: 18 BRPM | HEIGHT: 67 IN | TEMPERATURE: 97.7 F | DIASTOLIC BLOOD PRESSURE: 74 MMHG | OXYGEN SATURATION: 95 %

## 2023-07-31 DIAGNOSIS — R60.9 EDEMA, UNSPECIFIED TYPE: Primary | ICD-10-CM

## 2023-07-31 DIAGNOSIS — I10 ESSENTIAL (PRIMARY) HYPERTENSION: ICD-10-CM

## 2023-07-31 LAB
ALBUMIN SERPL-MCNC: 3.5 G/DL (ref 3.5–5)
ALBUMIN/GLOB SERPL: 0.9 (ref 1.1–2.2)
ALP SERPL-CCNC: 80 U/L (ref 45–117)
ALT SERPL-CCNC: 61 U/L (ref 12–78)
ANION GAP SERPL CALC-SCNC: 3 MMOL/L (ref 5–15)
AST SERPL-CCNC: 34 U/L (ref 15–37)
BASOPHILS # BLD: 0.1 K/UL (ref 0–0.1)
BASOPHILS NFR BLD: 1 % (ref 0–1)
BILIRUB SERPL-MCNC: 0.3 MG/DL (ref 0.2–1)
BUN SERPL-MCNC: 10 MG/DL (ref 6–20)
BUN/CREAT SERPL: 12 (ref 12–20)
CALCIUM SERPL-MCNC: 8.9 MG/DL (ref 8.5–10.1)
CHLORIDE SERPL-SCNC: 105 MMOL/L (ref 97–108)
CO2 SERPL-SCNC: 28 MMOL/L (ref 21–32)
CREAT SERPL-MCNC: 0.86 MG/DL (ref 0.55–1.02)
DIFFERENTIAL METHOD BLD: ABNORMAL
EOSINOPHIL # BLD: 0.3 K/UL (ref 0–0.4)
EOSINOPHIL NFR BLD: 6 % (ref 0–7)
ERYTHROCYTE [DISTWIDTH] IN BLOOD BY AUTOMATED COUNT: 13.7 % (ref 11.5–14.5)
GLOBULIN SER CALC-MCNC: 3.7 G/DL (ref 2–4)
GLUCOSE SERPL-MCNC: 101 MG/DL (ref 65–100)
HCT VFR BLD AUTO: 37.5 % (ref 35–47)
HGB BLD-MCNC: 11.5 G/DL (ref 11.5–16)
IMM GRANULOCYTES # BLD AUTO: 0 K/UL (ref 0–0.04)
IMM GRANULOCYTES NFR BLD AUTO: 0 % (ref 0–0.5)
LYMPHOCYTES # BLD: 1.6 K/UL (ref 0.8–3.5)
LYMPHOCYTES NFR BLD: 26 % (ref 12–49)
MCH RBC QN AUTO: 27.2 PG (ref 26–34)
MCHC RBC AUTO-ENTMCNC: 30.7 G/DL (ref 30–36.5)
MCV RBC AUTO: 88.7 FL (ref 80–99)
MONOCYTES # BLD: 0.6 K/UL (ref 0–1)
MONOCYTES NFR BLD: 10 % (ref 5–13)
NEUTS SEG # BLD: 3.5 K/UL (ref 1.8–8)
NEUTS SEG NFR BLD: 57 % (ref 32–75)
NRBC # BLD: 0 K/UL (ref 0–0.01)
NRBC BLD-RTO: 0 PER 100 WBC
PLATELET # BLD AUTO: 548 K/UL (ref 150–400)
PMV BLD AUTO: 10.5 FL (ref 8.9–12.9)
POTASSIUM SERPL-SCNC: 3.8 MMOL/L (ref 3.5–5.1)
PROT SERPL-MCNC: 7.2 G/DL (ref 6.4–8.2)
RBC # BLD AUTO: 4.23 M/UL (ref 3.8–5.2)
SODIUM SERPL-SCNC: 136 MMOL/L (ref 136–145)
WBC # BLD AUTO: 6.2 K/UL (ref 3.6–11)

## 2023-07-31 PROCEDURE — 3074F SYST BP LT 130 MM HG: CPT | Performed by: INTERNAL MEDICINE

## 2023-07-31 PROCEDURE — 99213 OFFICE O/P EST LOW 20 MIN: CPT | Performed by: INTERNAL MEDICINE

## 2023-07-31 PROCEDURE — 3078F DIAST BP <80 MM HG: CPT | Performed by: INTERNAL MEDICINE

## 2023-07-31 RX ORDER — HYDROCHLOROTHIAZIDE 25 MG/1
25 TABLET ORAL EVERY MORNING
Qty: 90 TABLET | Refills: 1 | Status: SHIPPED | OUTPATIENT
Start: 2023-07-31

## 2023-07-31 SDOH — ECONOMIC STABILITY: HOUSING INSECURITY
IN THE LAST 12 MONTHS, WAS THERE A TIME WHEN YOU DID NOT HAVE A STEADY PLACE TO SLEEP OR SLEPT IN A SHELTER (INCLUDING NOW)?: PATIENT REFUSED

## 2023-07-31 SDOH — ECONOMIC STABILITY: FOOD INSECURITY: WITHIN THE PAST 12 MONTHS, YOU WORRIED THAT YOUR FOOD WOULD RUN OUT BEFORE YOU GOT MONEY TO BUY MORE.: PATIENT DECLINED

## 2023-07-31 SDOH — ECONOMIC STABILITY: INCOME INSECURITY: HOW HARD IS IT FOR YOU TO PAY FOR THE VERY BASICS LIKE FOOD, HOUSING, MEDICAL CARE, AND HEATING?: PATIENT DECLINED

## 2023-07-31 SDOH — ECONOMIC STABILITY: TRANSPORTATION INSECURITY
IN THE PAST 12 MONTHS, HAS LACK OF TRANSPORTATION KEPT YOU FROM MEETINGS, WORK, OR FROM GETTING THINGS NEEDED FOR DAILY LIVING?: PATIENT DECLINED

## 2023-07-31 SDOH — ECONOMIC STABILITY: FOOD INSECURITY: WITHIN THE PAST 12 MONTHS, THE FOOD YOU BOUGHT JUST DIDN'T LAST AND YOU DIDN'T HAVE MONEY TO GET MORE.: PATIENT DECLINED

## 2023-07-31 NOTE — TELEPHONE ENCOUNTER
Patient walked into office  Requesting to be seen    States she just left eye doctor for evaluation of left eye droop  States she experienced this while working in [place]  States she was advised of no detected abnormalities and recommended she go to her PCP for evaluation of her heart and blood pressure. Patient is concerned due to swelling of legs and that she had been unable to get her medications [place] and it unclear why her Ozempic was denied  Reviewed prior auth and reason for denial.     Recommended she go to ED for immediate evaluation, patient declined stating she would rather be seen in office so she does not have a huge medical expense by going to ED    Patient voiced concern of office declining to speak with her health insurance proxy while in [facility].   Reviewed last permission to release form on file. Patient advised to name of entity on form and what data could be discussed.   Explained recent upgrade on EMR system causing all patients to be re-registered for initial appt to update demographics/insurance information     Patient accepted appt today at 330 pm with Dr. Rosemary Bailey, she was asked to be here by 315 pm for her registration information could be updated

## 2023-08-09 RX ORDER — ERGOCALCIFEROL 1.25 MG/1
CAPSULE ORAL
Qty: 4 CAPSULE | Refills: 0 | Status: SHIPPED | OUTPATIENT
Start: 2023-08-09

## 2023-08-16 ENCOUNTER — HOSPITAL ENCOUNTER (OUTPATIENT)
Facility: HOSPITAL | Age: 43
Discharge: HOME | End: 2023-08-18
Attending: INTERNAL MEDICINE
Payer: COMMERCIAL

## 2023-08-16 VITALS
BODY MASS INDEX: 33.27 KG/M2 | WEIGHT: 212 LBS | HEART RATE: 82 BPM | HEIGHT: 67 IN | DIASTOLIC BLOOD PRESSURE: 81 MMHG | SYSTOLIC BLOOD PRESSURE: 118 MMHG

## 2023-08-16 DIAGNOSIS — R60.9 EDEMA, UNSPECIFIED TYPE: ICD-10-CM

## 2023-08-16 LAB
ECHO AO ARCH DIAM: 2.9 CM
ECHO AO ASC DIAM: 3.2 CM
ECHO AO ASCENDING AORTA INDEX: 1.55 CM/M2
ECHO AV AREA PEAK VELOCITY: 2.5 CM2
ECHO AV AREA VTI: 2.5 CM2
ECHO AV AREA/BSA PEAK VELOCITY: 1.2 CM2/M2
ECHO AV AREA/BSA VTI: 1.2 CM2/M2
ECHO AV MEAN GRADIENT: 4 MMHG
ECHO AV MEAN VELOCITY: 0.9 M/S
ECHO AV PEAK GRADIENT: 7 MMHG
ECHO AV PEAK VELOCITY: 1.4 M/S
ECHO AV VELOCITY RATIO: 0.86
ECHO AV VTI: 28 CM
ECHO BSA: 2.13 M2
ECHO LA DIAMETER INDEX: 1.5 CM/M2
ECHO LA DIAMETER: 3.1 CM
ECHO LA VOL 2C: 40 ML (ref 22–52)
ECHO LA VOL 2C: 41 ML (ref 22–52)
ECHO LA VOL 4C: 47 ML (ref 22–52)
ECHO LA VOL 4C: 49 ML (ref 22–52)
ECHO LA VOL BP: 45 ML (ref 22–52)
ECHO LA VOL/BSA BIPLANE: 22 ML/M2 (ref 16–34)
ECHO LA VOLUME AREA LENGTH: 48 ML
ECHO LA VOLUME INDEX AREA LENGTH: 23 ML/M2 (ref 16–34)
ECHO LV E' LATERAL VELOCITY: 11 CM/S
ECHO LV E' SEPTAL VELOCITY: 11 CM/S
ECHO LV EDV A2C: 92 ML
ECHO LV EDV A4C: 87 ML
ECHO LV EDV BP: 89 ML (ref 56–104)
ECHO LV EDV INDEX A4C: 42 ML/M2
ECHO LV EDV INDEX BP: 43 ML/M2
ECHO LV EDV NDEX A2C: 44 ML/M2
ECHO LV EJECTION FRACTION A2C: 66 %
ECHO LV EJECTION FRACTION A4C: 68 %
ECHO LV EJECTION FRACTION BIPLANE: 66 % (ref 55–100)
ECHO LV ESV A2C: 31 ML
ECHO LV ESV A4C: 28 ML
ECHO LV ESV BP: 30 ML (ref 19–49)
ECHO LV ESV INDEX A2C: 15 ML/M2
ECHO LV ESV INDEX A4C: 14 ML/M2
ECHO LV ESV INDEX BP: 14 ML/M2
ECHO LV FRACTIONAL SHORTENING: 43 % (ref 28–44)
ECHO LV INTERNAL DIMENSION DIASTOLE INDEX: 2.03 CM/M2
ECHO LV INTERNAL DIMENSION DIASTOLIC: 4.2 CM (ref 3.9–5.3)
ECHO LV INTERNAL DIMENSION SYSTOLIC INDEX: 1.16 CM/M2
ECHO LV INTERNAL DIMENSION SYSTOLIC: 2.4 CM
ECHO LV IVSD: 0.8 CM (ref 0.6–0.9)
ECHO LV MASS 2D: 118.7 G (ref 67–162)
ECHO LV MASS INDEX 2D: 57.3 G/M2 (ref 43–95)
ECHO LV POSTERIOR WALL DIASTOLIC: 1 CM (ref 0.6–0.9)
ECHO LV RELATIVE WALL THICKNESS RATIO: 0.48
ECHO LVOT AREA: 2.8 CM2
ECHO LVOT AV VTI INDEX: 0.87
ECHO LVOT DIAM: 1.9 CM
ECHO LVOT MEAN GRADIENT: 2 MMHG
ECHO LVOT PEAK GRADIENT: 6 MMHG
ECHO LVOT PEAK VELOCITY: 1.2 M/S
ECHO LVOT STROKE VOLUME INDEX: 33.4 ML/M2
ECHO LVOT SV: 69.1 ML
ECHO LVOT VTI: 24.4 CM
ECHO MV A VELOCITY: 0.79 M/S
ECHO MV E DECELERATION TIME (DT): 215.2 MS
ECHO MV E VELOCITY: 0.72 M/S
ECHO MV E/A RATIO: 0.91
ECHO MV E/E' LATERAL: 6.55
ECHO MV E/E' RATIO (AVERAGED): 6.55
ECHO MV E/E' SEPTAL: 6.55
ECHO RV FREE WALL PEAK S': 13 CM/S
ECHO RV INTERNAL DIMENSION: 3.8 CM
ECHO RV TAPSE: 2.6 CM (ref 1.7–?)

## 2023-08-16 PROCEDURE — 93306 TTE W/DOPPLER COMPLETE: CPT

## 2023-08-29 DIAGNOSIS — I10 ESSENTIAL (PRIMARY) HYPERTENSION: ICD-10-CM

## 2023-08-29 RX ORDER — HYDROCHLOROTHIAZIDE 25 MG/1
25 TABLET ORAL EVERY MORNING
Qty: 90 TABLET | Refills: 1 | Status: SHIPPED | OUTPATIENT
Start: 2023-08-29

## 2023-08-29 NOTE — TELEPHONE ENCOUNTER
Patient called in stating  put her on a new BP medication last month, she wanted to know if she's suppose to continue on this medication? If so, she will require a refill sent in to pharmacy.     She states she noticed a big difference in her leg swelling since starting this medication

## 2023-08-29 NOTE — TELEPHONE ENCOUNTER
PCP: Clemencia Carson MD    Last appt:   7/31/2023    Future Appointments   Date Time Provider [address]   9/25/2023  2:00 PM Clemencia Carson MD [facility] BS AMB       Requested Prescriptions     Pending Prescriptions Disp Refills    hydroCHLOROthiazide (HYDRODIURIL) 25 MG tablet 90 tablet 1     Sig: Take 1 tablet by mouth every morning

## 2023-10-20 RX ORDER — ERGOCALCIFEROL 1.25 MG/1
CAPSULE ORAL
Qty: 4 CAPSULE | Refills: 0 | Status: SHIPPED | OUTPATIENT
Start: 2023-10-20

## 2023-10-24 ENCOUNTER — TELEPHONE (OUTPATIENT)
Age: 43
End: 2023-10-24

## 2023-10-24 NOTE — TELEPHONE ENCOUNTER
Attempted to contact patient to reschedule 4/2/24 appointment. Provider out of office. Patient needs to reschedule for next available.

## 2023-10-26 LAB — HBA1C MFR BLD HPLC: 5.5 %

## 2023-12-06 RX ORDER — ERGOCALCIFEROL 1.25 MG/1
CAPSULE ORAL
Qty: 4 CAPSULE | Refills: 0 | Status: SHIPPED | OUTPATIENT
Start: 2023-12-06 | End: 2024-01-15

## 2023-12-28 DIAGNOSIS — I10 ESSENTIAL (PRIMARY) HYPERTENSION: ICD-10-CM

## 2023-12-28 RX ORDER — IBUPROFEN 800 MG/1
800 TABLET ORAL EVERY 8 HOURS PRN
Qty: 90 TABLET | Refills: 0 | Status: CANCELLED | OUTPATIENT
Start: 2023-12-28

## 2023-12-28 RX ORDER — NAPROXEN 500 MG/1
500 TABLET ORAL 2 TIMES DAILY PRN
Qty: 60 TABLET | Refills: 3 | Status: CANCELLED | OUTPATIENT
Start: 2023-12-28

## 2023-12-28 NOTE — TELEPHONE ENCOUNTER
While rescheduling patient to 2/12/24, patient requested two blood pressure medications be refilled. Please advise if medications can be refilled prior to appointment on 2/12/24.

## 2023-12-28 NOTE — TELEPHONE ENCOUNTER
PCP: Kristi [place] MD    Last appt: 3/24/2023  Future Appointments   Date Time Provider Department Center   2/12/2024 10:15 AM Kristi [place] MD [facility] BS AMB       Requested Prescriptions     Pending Prescriptions Disp Refills    naproxen (NAPROSYN) 500 MG tablet 60 tablet 3     Sig: Take 1 tablet by mouth 2 times daily as needed for Pain    ibuprofen (ADVIL;MOTRIN) 800 MG tablet 90 tablet 0     Sig: Take 1 tablet by mouth every 8 hours as needed for Fever         Patient would like for PCP to review labs from GYN - she is concerned for kidney function due to intermittent flank pain.   Declined advisement to go to Urgent Care for evaluation     Recent labs obtained from [facility] and sent to provider for review      Consent Type: Consent 1 (Standard)

## 2023-12-28 NOTE — TELEPHONE ENCOUNTER
Patient indicates she does not need refills on blood pressure meds, states she only takes hydrochlorothiazide and does not need a refill     Patient confirms she needs refills on the naproxen and ibuprofen. she has taken these without concerns.   Patient states it was the Advil PM that she developed a rash to several years ago but tolerates the others just fine.

## 2024-01-15 RX ORDER — ERGOCALCIFEROL 1.25 MG/1
CAPSULE ORAL
Qty: 4 CAPSULE | Refills: 0 | Status: SHIPPED | OUTPATIENT
Start: 2024-01-15

## 2024-01-19 ENCOUNTER — APPOINTMENT (OUTPATIENT)
Facility: HOSPITAL | Age: 44
End: 2024-01-19
Payer: MEDICAID

## 2024-01-19 ENCOUNTER — HOSPITAL ENCOUNTER (EMERGENCY)
Facility: HOSPITAL | Age: 44
Discharge: HOME | End: 2024-01-19
Payer: MEDICAID

## 2024-01-19 VITALS
HEART RATE: 69 BPM | TEMPERATURE: 99 F | BODY MASS INDEX: 30.51 KG/M2 | WEIGHT: 189.82 LBS | DIASTOLIC BLOOD PRESSURE: 93 MMHG | SYSTOLIC BLOOD PRESSURE: 133 MMHG | HEIGHT: 66 IN | RESPIRATION RATE: 16 BRPM | OXYGEN SATURATION: 99 %

## 2024-01-19 DIAGNOSIS — G57.02 PIRIFORMIS SYNDROME OF LEFT SIDE: ICD-10-CM

## 2024-01-19 DIAGNOSIS — J06.9 ACUTE UPPER RESPIRATORY INFECTION: Primary | ICD-10-CM

## 2024-01-19 LAB
DEPRECATED S PYO AG THROAT QL EIA: NEGATIVE
FLUAV AG NPH QL IA: NEGATIVE
FLUBV AG NOSE QL IA: NEGATIVE
SARS-COV-2 RDRP RESP QL NAA+PROBE: NOT DETECTED
SOURCE: NORMAL

## 2024-01-19 PROCEDURE — 87880 STREP A ASSAY W/OPTIC: CPT

## 2024-01-19 PROCEDURE — 73502 X-RAY EXAM HIP UNI 2-3 VIEWS: CPT

## 2024-01-19 PROCEDURE — 6360000002 HC RX W HCPCS: Performed by: PHYSICIAN ASSISTANT

## 2024-01-19 PROCEDURE — 96372 THER/PROPH/DIAG INJ SC/IM: CPT

## 2024-01-19 PROCEDURE — 87070 CULTURE OTHR SPECIMN AEROBIC: CPT

## 2024-01-19 PROCEDURE — 87804 INFLUENZA ASSAY W/OPTIC: CPT

## 2024-01-19 PROCEDURE — 72100 X-RAY EXAM L-S SPINE 2/3 VWS: CPT

## 2024-01-19 PROCEDURE — 99284 EMERGENCY DEPT VISIT MOD MDM: CPT

## 2024-01-19 PROCEDURE — 87635 SARS-COV-2 COVID-19 AMP PRB: CPT

## 2024-01-19 RX ORDER — LIDOCAINE 50 MG/G
1 PATCH TOPICAL DAILY
Qty: 10 PATCH | Refills: 0 | Status: SHIPPED | OUTPATIENT
Start: 2024-01-19 | End: 2024-01-29

## 2024-01-19 RX ORDER — PREDNISONE 5 MG/1
TABLET ORAL
Qty: 21 EACH | Refills: 0 | Status: SHIPPED | OUTPATIENT
Start: 2024-01-19

## 2024-01-19 RX ORDER — ACETAMINOPHEN 500 MG
1000 TABLET ORAL EVERY 6 HOURS PRN
Qty: 20 TABLET | Refills: 0 | Status: SHIPPED | OUTPATIENT
Start: 2024-01-19

## 2024-01-19 RX ORDER — KETOROLAC TROMETHAMINE 30 MG/ML
15 INJECTION, SOLUTION INTRAMUSCULAR; INTRAVENOUS
Status: COMPLETED | OUTPATIENT
Start: 2024-01-19 | End: 2024-01-19

## 2024-01-19 RX ORDER — CYCLOBENZAPRINE HCL 5 MG
5 TABLET ORAL 3 TIMES DAILY PRN
Qty: 30 TABLET | Refills: 0 | Status: SHIPPED | OUTPATIENT
Start: 2024-01-19 | End: 2024-01-29

## 2024-01-19 RX ORDER — NAPROXEN 500 MG/1
500 TABLET ORAL 2 TIMES DAILY PRN
Qty: 20 TABLET | Refills: 0 | Status: SHIPPED | OUTPATIENT
Start: 2024-01-19

## 2024-01-19 RX ADMIN — KETOROLAC TROMETHAMINE 15 MG: 30 INJECTION, SOLUTION INTRAMUSCULAR; INTRAVENOUS at 17:19

## 2024-01-19 ASSESSMENT — ENCOUNTER SYMPTOMS
SINUS PAIN: 0
COUGH: 1
SORE THROAT: 1
NAUSEA: 0
VOMITING: 0
SHORTNESS OF BREATH: 0
WHEEZING: 0
EYE PAIN: 0
SINUS PRESSURE: 0
BACK PAIN: 0
RHINORRHEA: 1
FACIAL SWELLING: 0
TROUBLE SWALLOWING: 0
CHEST TIGHTNESS: 0
ABDOMINAL PAIN: 0
DIARRHEA: 0
VOICE CHANGE: 0

## 2024-01-19 ASSESSMENT — PAIN SCALES - GENERAL: PAINLEVEL_OUTOF10: 8

## 2024-01-19 NOTE — ED PROVIDER NOTES
[facility] EMERGENCY DEPT  EMERGENCY DEPARTMENT ENCOUNTER         Pt Name: Jaqueline Singer  MRN: [id number]  Birthdate 1980  Date of evaluation: 1/19/2024  Provider: Baylee Knutson PA-C   PCP: Kristi [place] MD  Note Started: 6:17 PM EST on 1/19/24     CHIEF COMPLAINT       Chief Complaint   Patient presents with    Hip Pain     Left hip pain off and on for two weeks. No known injury. Pain so bad especially in the morning or to move.     Cough    Pharyngitis     Also has had a cold, sore throat two days ago and cough with yellow phlegm        HISTORY OF PRESENT ILLNESS: 1 or more elements      History From: Patient  None     Jaqueline Singer is a 43 y.o. female with medical history significant for depression, autoimmune disease, GERD, fibromyalgia, anxiety, hypertension who presents via self with complaints of acute moderate aching and sharp left-sided buttock pain rating from left lower back down her left leg X 2 weeks.  Endorses symptoms started after she had been doing some exercising, but the pain did not start till the following day.  Denies any blunt injury or trauma.  She does have a history of similar pain in the past and was followed by [facility] and had physical therapy.  Endorses taking over-the-counter analgesics including Aleve and Tylenol with minimal relief of symptoms.  No numbness, tingling, focal weakness, redness, rash, wound, gait abnormalities.  Pain is exacerbated with movement/ambulation.  Additionally endorses stretches without relief.  Patient also states that she has been having cold symptoms including sore throat, cough, congestion X 3 days.  Endorses male partner with similar symptoms over the last week.  No chest pain, shortness of breath, wheezing, abdominal pain, nausea, vomiting, diarrhea.      Nursing Notes were all reviewed and agreed with or any disagreements were addressed in the HPI.     REVIEW OF SYSTEMS      Review of Systems   Constitutional:  Negative for activity

## 2024-01-21 LAB
BACTERIA SPEC CULT: NORMAL
SERVICE CMNT-IMP: NORMAL

## 2024-02-02 ENCOUNTER — TELEPHONE (OUTPATIENT)
Age: 44
End: 2024-02-02

## 2024-02-02 NOTE — TELEPHONE ENCOUNTER
Currently on prednisone for pain in leg      States getting extremely tired, just feels sleepy like she needs to take a nap but cannot.  Thirsty, lips dry.      States is that side affect ok to keep taking the med or should she stop.    Please call to advise.

## 2024-02-05 RX ORDER — ERGOCALCIFEROL 1.25 MG/1
CAPSULE ORAL
Qty: 4 CAPSULE | Refills: 0 | Status: SHIPPED | OUTPATIENT
Start: 2024-02-05

## 2024-02-05 NOTE — TELEPHONE ENCOUNTER
Called and spoke to patient. She is feeling better. She states that she wants to keep taking the medication.

## 2024-02-12 ENCOUNTER — OFFICE VISIT (OUTPATIENT)
Age: 44
End: 2024-02-12
Payer: MEDICAID

## 2024-02-12 VITALS
DIASTOLIC BLOOD PRESSURE: 83 MMHG | OXYGEN SATURATION: 98 % | RESPIRATION RATE: 18 BRPM | SYSTOLIC BLOOD PRESSURE: 118 MMHG | HEART RATE: 72 BPM | HEIGHT: 66 IN | BODY MASS INDEX: 31.24 KG/M2 | TEMPERATURE: 98.1 F | WEIGHT: 194.4 LBS

## 2024-02-12 DIAGNOSIS — I10 PRIMARY HYPERTENSION: ICD-10-CM

## 2024-02-12 DIAGNOSIS — M54.42 CHRONIC LEFT-SIDED LOW BACK PAIN WITH LEFT-SIDED SCIATICA: Primary | ICD-10-CM

## 2024-02-12 DIAGNOSIS — E28.2 PCOS (POLYCYSTIC OVARIAN SYNDROME): ICD-10-CM

## 2024-02-12 DIAGNOSIS — E55.9 VITAMIN D DEFICIENCY: ICD-10-CM

## 2024-02-12 DIAGNOSIS — G89.29 CHRONIC LEFT-SIDED LOW BACK PAIN WITH LEFT-SIDED SCIATICA: Primary | ICD-10-CM

## 2024-02-12 DIAGNOSIS — E78.00 HIGH CHOLESTEROL: ICD-10-CM

## 2024-02-12 PROCEDURE — 99214 OFFICE O/P EST MOD 30 MIN: CPT | Performed by: INTERNAL MEDICINE

## 2024-02-12 PROCEDURE — 3074F SYST BP LT 130 MM HG: CPT | Performed by: INTERNAL MEDICINE

## 2024-02-12 PROCEDURE — 3079F DIAST BP 80-89 MM HG: CPT | Performed by: INTERNAL MEDICINE

## 2024-02-12 RX ORDER — ERGOCALCIFEROL 1.25 MG/1
50000 CAPSULE ORAL
Qty: 4 CAPSULE | Refills: 5 | Status: SHIPPED | OUTPATIENT
Start: 2024-02-12

## 2024-02-12 RX ORDER — ACETAMINOPHEN AND CODEINE PHOSPHATE 120; 12 MG/5ML; MG/5ML
1 SOLUTION ORAL DAILY
COMMUNITY

## 2024-02-12 NOTE — PROGRESS NOTES
Ms. Jaqueline Singer is presenting to follow up     CC:  Blood Pressure Check and Hip Pain (Onset one month ago, pt requesting to see Dr.Patrick Gray because she saw him for neck last year.)       HPI:    Ms. Jaqueline Singer   is a 44 y.o. female with a hx of HTN and obesity presenting to follow up       HTN: taking HCTZ and working well \" I was hoping to stop medicaiton\"  Discussed BP will increase if medication stopped       Has acid reflux: taking protonix as needed    Had cold 3 weeks ago and symptoms improved    Going to gym and exercising frequently  Having pain in peft hip and suspects coming from back   Wants to see Dr Gray    PCOS: on OCP  Did not tolerate metformin  Had stomach issues      Dizziness: resolved    No kids     Review of systems:  Constitutional: negative for fever, chills, weight loss, night sweats   10 systems reviewed and negative other then HPI     Past Medical History:   Diagnosis Date    Anxiety     Autoimmune disease (HCC)     fibromyalgia    Bandemia 10/2/2020    Dr esquivel, also anemia [facility]    Clotting disorder (HCC)     thrombocypentia    Depression     and anxiety    Fibromyalgia     GERD (gastroesophageal reflux disease)     Hx of mammogram 03/2021    Hypertension 3/2023        Past Surgical History:   Procedure Laterality Date    COLONOSCOPY  01/19/2021    DENTAL SURGERY  01/24/2022    GYN      fallopian tube removed after tubal pregnancy    GYN      hysteroscopy/laporoscopy to remove schar tissue    OTHER SURGICAL HISTORY      oral surgery for wisdom teeth, root canal       Allergies   Allergen Reactions    Latex Rash    Ibuprofen-Diphenhydramine Cit Hives     Can take benadryl without issue  Advil-pm caused hives on skin localized around wrists.     Oxycodone-Acetaminophen Itching    Tobramycin Swelling     Eye drop    Bupropion Palpitations and Other (See Comments)     palpitations       Current Outpatient Medications on File Prior to Visit   Medication Sig Dispense Refill

## 2024-02-12 NOTE — PROGRESS NOTES
Chief Complaint   Patient presents with    Blood Pressure Check    Hip Pain     Onset one month ago, pt requesting to see Dr.Patrick Gray because she saw him for neck last year.     \"Have you been to the ER, urgent care clinic since your last visit?  Hospitalized since your last visit?\"    YES - When: approximately 3  weeks ago.  Where and Why: URI and hip pain at [place].    “Have you seen or consulted any other health care providers outside of [facility] since your last visit?”    NO

## 2024-02-13 LAB
ALBUMIN SERPL-MCNC: 4.2 G/DL (ref 3.9–4.9)
ALBUMIN/GLOB SERPL: 2.1 {RATIO} (ref 1.2–2.2)
ALP SERPL-CCNC: 61 IU/L (ref 44–121)
ALT SERPL-CCNC: 13 IU/L (ref 0–32)
AST SERPL-CCNC: 18 IU/L (ref 0–40)
BASOPHILS NFR BLD AUTO: 1 %
BILIRUB SERPL-MCNC: 0.3 MG/DL (ref 0–1.2)
BUN SERPL-MCNC: 12 MG/DL (ref 6–24)
BUN/CREAT SERPL: 16 (ref 9–23)
CALCIUM SERPL-MCNC: 9.2 MG/DL (ref 8.7–10.2)
CHLORIDE SERPL-SCNC: 101 MMOL/L (ref 96–106)
CHOLEST SERPL-MCNC: 245 MG/DL (ref 100–199)
CO2 SERPL-SCNC: 22 MMOL/L (ref 20–29)
CREAT SERPL-MCNC: 0.77 MG/DL (ref 0.57–1)
EGFRCR SERPLBLD CKD-EPI 2021: 96 ML/MIN/1.73
EOSINOPHIL # BLD AUTO: 0.2 X10E3/UL (ref 0–0.4)
EOSINOPHIL NFR BLD AUTO: 5 %
ERYTHROCYTE [DISTWIDTH] IN BLOOD BY AUTOMATED COUNT: 13.9 % (ref 11.7–15.4)
GLOBULIN SER CALC-MCNC: 2 G/DL (ref 1.5–4.5)
GLUCOSE SERPL-MCNC: 92 MG/DL (ref 70–99)
HBA1C MFR BLD: 5.6 % (ref 4.8–5.6)
HCT VFR BLD AUTO: 34.8 % (ref 34–46.6)
HDLC SERPL-MCNC: 64 MG/DL
HGB BLD-MCNC: 11.5 G/DL (ref 11.1–15.9)
IMM GRANULOCYTES # BLD AUTO: 0 X10E3/UL (ref 0–0.1)
IMM GRANULOCYTES NFR BLD AUTO: 0 %
LDLC SERPL CALC-MCNC: 168 MG/DL (ref 0–99)
LYMPHOCYTES # BLD AUTO: 1.5 X10E3/UL (ref 0.7–3.1)
LYMPHOCYTES NFR BLD AUTO: 33 %
MCH RBC QN AUTO: 27.8 PG (ref 26.6–33)
MCHC RBC AUTO-ENTMCNC: 33 G/DL (ref 31.5–35.7)
MCV RBC AUTO: 84 FL (ref 79–97)
MONOCYTES # BLD AUTO: 0.5 X10E3/UL (ref 0.1–0.9)
MONOCYTES NFR BLD AUTO: 11 %
NEUTROPHILS # BLD AUTO: 2.3 X10E3/UL (ref 1.4–7)
NEUTROPHILS NFR BLD AUTO: 50 %
PLATELET # BLD AUTO: 580 X10E3/UL (ref 150–450)
POTASSIUM SERPL-SCNC: 4.6 MMOL/L (ref 3.5–5.2)
PROT SERPL-MCNC: 6.2 G/DL (ref 6–8.5)
RBC # BLD AUTO: 4.14 X10E6/UL (ref 3.77–5.28)
SODIUM SERPL-SCNC: 139 MMOL/L (ref 134–144)
TRIGL SERPL-MCNC: 75 MG/DL (ref 0–149)
VLDLC SERPL CALC-MCNC: 13 MG/DL (ref 5–40)
WBC # BLD AUTO: 4.6 X10E3/UL (ref 3.4–10.8)

## 2024-04-15 ENCOUNTER — TRANSCRIBE ORDERS (OUTPATIENT)
Facility: HOSPITAL | Age: 44
End: 2024-04-15

## 2024-04-15 DIAGNOSIS — Z12.31 BREAST CANCER SCREENING BY MAMMOGRAM: Primary | ICD-10-CM

## 2024-04-23 ENCOUNTER — TELEPHONE (OUTPATIENT)
Age: 44
End: 2024-04-23

## 2024-04-23 ENCOUNTER — HOSPITAL ENCOUNTER (OUTPATIENT)
Facility: HOSPITAL | Age: 44
Discharge: HOME | End: 2024-04-26
Attending: OBSTETRICS & GYNECOLOGY
Payer: MEDICAID

## 2024-04-23 VITALS — HEIGHT: 66 IN | WEIGHT: 194 LBS | BODY MASS INDEX: 31.18 KG/M2

## 2024-04-23 DIAGNOSIS — Z12.31 BREAST CANCER SCREENING BY MAMMOGRAM: ICD-10-CM

## 2024-04-23 PROCEDURE — 77063 BREAST TOMOSYNTHESIS BI: CPT

## 2024-04-23 NOTE — TELEPHONE ENCOUNTER
Spoke to Cynthia from [facility] for PA.  They stated she needs more information for Wegovy.  Pt is currently taking ozempic, pt does not need to be on both medications.  Informed Cynthia request can be cancelled out.

## 2024-04-30 ENCOUNTER — HOSPITAL ENCOUNTER (OUTPATIENT)
Facility: HOSPITAL | Age: 44
Discharge: HOME | End: 2024-05-03
Attending: ORTHOPAEDIC SURGERY
Payer: MEDICAID

## 2024-04-30 DIAGNOSIS — M47.816 LUMBAR SPONDYLOSIS: ICD-10-CM

## 2024-04-30 DIAGNOSIS — M54.32 LEFT SIDED SCIATICA: ICD-10-CM

## 2024-04-30 DIAGNOSIS — M54.12 CERVICAL RADICULOPATHY: ICD-10-CM

## 2024-04-30 DIAGNOSIS — M54.2 CERVICALGIA: ICD-10-CM

## 2024-04-30 DIAGNOSIS — M51.36 DDD (DEGENERATIVE DISC DISEASE), LUMBAR: ICD-10-CM

## 2024-04-30 DIAGNOSIS — M54.2 NECK PAIN: ICD-10-CM

## 2024-04-30 DIAGNOSIS — M54.50 LUMBAR PAIN: ICD-10-CM

## 2024-04-30 DIAGNOSIS — M50.30 DDD (DEGENERATIVE DISC DISEASE), CERVICAL: ICD-10-CM

## 2024-04-30 DIAGNOSIS — M47.812 CERVICAL SPONDYLOSIS WITHOUT MYELOPATHY: ICD-10-CM

## 2024-04-30 DIAGNOSIS — M54.16 RADICULOPATHY OF LUMBAR REGION: ICD-10-CM

## 2024-04-30 PROCEDURE — 72148 MRI LUMBAR SPINE W/O DYE: CPT

## 2024-04-30 PROCEDURE — 72141 MRI NECK SPINE W/O DYE: CPT

## 2024-05-01 ENCOUNTER — TELEPHONE (OUTPATIENT)
Age: 44
End: 2024-05-01

## 2024-05-01 NOTE — TELEPHONE ENCOUNTER
Pt called the office to get an update about a prior auth for wegovy. Pt is on wegovy/ozempic (either is fine). Pt is on the medication for PCOS. Pt stated there was a form recently given to the office about the PA. Please update pt of PA.

## 2024-05-02 NOTE — TELEPHONE ENCOUNTER
(Key: [id number]) - [id number]  Ozempic (0.25 or 0.5 MG/DOSE) 2MG/3ML pen-injectors  Status: PA RequestCreated: May 2nd, 2024Sent: May 2nd, 2024

## 2024-05-15 ENCOUNTER — TELEPHONE (OUTPATIENT)
Age: 44
End: 2024-05-15

## 2024-05-15 NOTE — TELEPHONE ENCOUNTER
Pt can not send her lab results to her ob/gyn.  She would like a release of records faxed to her to fax back to us.    Fax #[phone]  Attn: Jaqueline     Be sure that our fax number is on cover sheet pt ask.  Thanks.

## 2024-05-17 ENCOUNTER — TELEPHONE (OUTPATIENT)
Age: 44
End: 2024-05-17

## 2024-05-17 NOTE — TELEPHONE ENCOUNTER
Patient states faxed the form this morning. Informed patient that the fax has not be received at the moment but will look out for it. Patient states needs labs faxed over to GYN office.

## 2024-05-17 NOTE — TELEPHONE ENCOUNTER
Patient states she needs a call back to be advised if Completed Medical Release form has bee received back. Please call. Thank you

## 2024-05-21 NOTE — TELEPHONE ENCOUNTER
Patient states she needs a call back to be advised If Medical Release Form has been received for records/labs to be sent to GYN. Please call to update. Thank you

## 2024-05-21 NOTE — TELEPHONE ENCOUNTER
Informed patient the form has not been received. Informed patient that she can upload it to [employer] or bring the form into the office. Patient states is going to try to upload it to [employer].

## 2024-06-10 RX ORDER — IBUPROFEN 800 MG/1
800 TABLET ORAL PRN
Qty: 90 TABLET | Refills: 0 | OUTPATIENT
Start: 2024-06-10

## 2024-06-10 RX ORDER — NAPROXEN SODIUM 550 MG/1
550 TABLET ORAL 2 TIMES DAILY WITH MEALS
Qty: 60 TABLET | Refills: 3 | Status: SHIPPED | OUTPATIENT
Start: 2024-06-10

## 2024-06-10 RX ORDER — ACETAMINOPHEN 500 MG
1000 TABLET ORAL EVERY 6 HOURS PRN
Qty: 20 TABLET | Refills: 0 | Status: SHIPPED | OUTPATIENT
Start: 2024-06-10

## 2024-06-10 RX ORDER — NAPROXEN 500 MG/1
500 TABLET ORAL 2 TIMES DAILY PRN
Qty: 20 TABLET | Refills: 0 | Status: CANCELLED | OUTPATIENT
Start: 2024-06-10

## 2024-06-10 NOTE — TELEPHONE ENCOUNTER
PCP: Kristi [place] MD    Last appt:   2/12/2024    No future appointments.    Requested Prescriptions     Pending Prescriptions Disp Refills    naproxen (NAPROSYN) 500 MG tablet 20 tablet 0     Sig: Take 1 tablet by mouth 2 times daily as needed for Pain    acetaminophen (TYLENOL) 500 MG tablet 20 tablet 0     Sig: Take 2 tablets by mouth every 6 hours as needed for Pain    ibuprofen (ADVIL;MOTRIN) 800 MG tablet 90 tablet 0     Sig: Take 1 tablet by mouth as needed for Pain

## 2024-06-10 NOTE — TELEPHONE ENCOUNTER
Pt is calling to have a med refill of naproxen 500 mg, acetaminophen 500 mg, and motrin 800 mg. Please send medications to [facility] [address] [place] [id number]. 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 Pt recently had an injection and is in a lot of pain. Please advise.
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
## 2024-06-27 RX ORDER — NAPROXEN 500 MG/1
TABLET ORAL
Qty: 60 TABLET | Refills: 0 | OUTPATIENT
Start: 2024-06-27

## 2024-07-01 NOTE — TELEPHONE ENCOUNTER
[place] states [place] back in [place] and would like refill of Naproxen 500 mg to be sent to       [facility] at [address] [facility], [place] [address]     Please give pt call once sent in

## 2024-07-02 RX ORDER — NAPROXEN 500 MG/1
500 TABLET ORAL 2 TIMES DAILY PRN
Qty: 20 TABLET | Refills: 0 | Status: SHIPPED | OUTPATIENT
Start: 2024-07-02

## 2024-07-02 NOTE — TELEPHONE ENCOUNTER
PCP: Kristi [place] MD    Last appt:   2/12/2024    No future appointments.    Requested Prescriptions     Pending Prescriptions Disp Refills    naproxen (NAPROSYN) 500 MG tablet 20 tablet 0     Sig: Take 1 tablet by mouth 2 times daily as needed for Pain

## 2024-07-05 NOTE — TELEPHONE ENCOUNTER
Patient states she needs a call back in reference to Why was Only 10 days sent in on her Naproxen (NAPROSYN) 500 MG tablet sent to [facility]. Please call to discuss & advise. Thank you

## 2024-07-08 RX ORDER — NAPROXEN 500 MG/1
500 TABLET ORAL 2 TIMES DAILY PRN
Qty: 60 TABLET | Refills: 0 | Status: SHIPPED | OUTPATIENT
Start: 2024-07-08

## 2024-07-08 NOTE — TELEPHONE ENCOUNTER
Patient states she needs a call back today in reference to her Medication refill being done incorrectly//shorted on amount/Quantity. Please call. Thank you

## 2024-08-19 RX ORDER — KETOCONAZOLE 20 MG/ML
SHAMPOO TOPICAL
Qty: 120 ML | Refills: 0 | Status: SHIPPED | OUTPATIENT
Start: 2024-08-19

## 2024-09-11 DIAGNOSIS — E55.9 VITAMIN D DEFICIENCY: ICD-10-CM

## 2024-09-11 RX ORDER — ERGOCALCIFEROL 1.25 MG/1
50000 CAPSULE, LIQUID FILLED ORAL
Qty: 4 CAPSULE | Refills: 3 | Status: SHIPPED | OUTPATIENT
Start: 2024-09-11 | End: 2024-09-11 | Stop reason: SDUPTHER

## 2024-09-11 RX ORDER — ERGOCALCIFEROL 1.25 MG/1
50000 CAPSULE, LIQUID FILLED ORAL
Qty: 13 CAPSULE | Refills: 0 | Status: SHIPPED | OUTPATIENT
Start: 2024-09-11

## 2024-09-11 RX ORDER — NAPROXEN 500 MG/1
TABLET ORAL
Qty: 60 TABLET | Refills: 3 | Status: SHIPPED | OUTPATIENT
Start: 2024-09-11

## 2024-09-18 ENCOUNTER — TELEPHONE (OUTPATIENT)
Age: 44
End: 2024-09-18

## 2024-09-18 NOTE — TELEPHONE ENCOUNTER
Spoke to pt using two pt identifiers.    Pt was informed according to our system a PA is not required.     Pt states understanding.

## 2024-09-18 NOTE — TELEPHONE ENCOUNTER
Pt was told that a PA needs to be back dated for today to be fully covered. It is for the vitamin D. Pt stated that a PA will need to be sent [employer] for a 90 day supply. Please advise and let pt know once corrected

## 2024-09-18 NOTE — TELEPHONE ENCOUNTER
Pt is requesting all medications moving forward be filled as a 90 day supply due to pt being out of town a lot for work.    Pt states she is not taking HCTZ anymore due to leg cramping and just wanted PCP to be aware.

## 2025-01-15 DIAGNOSIS — I10 ESSENTIAL (PRIMARY) HYPERTENSION: ICD-10-CM

## 2025-01-16 RX ORDER — HYDROCHLOROTHIAZIDE 25 MG/1
25 TABLET ORAL EVERY MORNING
Qty: 90 TABLET | Refills: 0 | Status: SHIPPED | OUTPATIENT
Start: 2025-01-16

## 2025-02-11 RX ORDER — KETOCONAZOLE 20 MG/ML
SHAMPOO, SUSPENSION TOPICAL
Qty: 120 ML | Refills: 0 | Status: SHIPPED | OUTPATIENT
Start: 2025-02-11

## 2025-04-22 DIAGNOSIS — E55.9 VITAMIN D DEFICIENCY: ICD-10-CM

## 2025-04-22 RX ORDER — ERGOCALCIFEROL 1.25 MG/1
50000 CAPSULE, LIQUID FILLED ORAL
Qty: 4 CAPSULE | Refills: 0 | OUTPATIENT
Start: 2025-04-22

## 2025-04-23 ENCOUNTER — TRANSCRIBE ORDERS (OUTPATIENT)
Facility: HOSPITAL | Age: 45
End: 2025-04-23

## 2025-04-23 DIAGNOSIS — Z12.31 ENCOUNTER FOR SCREENING MAMMOGRAM FOR MALIGNANT NEOPLASM OF BREAST: Primary | ICD-10-CM

## 2025-05-16 ENCOUNTER — OFFICE VISIT (OUTPATIENT)
Age: 45
End: 2025-05-16
Payer: MEDICAID

## 2025-05-16 VITALS — HEIGHT: 65 IN | BODY MASS INDEX: 33.15 KG/M2 | WEIGHT: 199 LBS

## 2025-05-16 DIAGNOSIS — N60.81 SEBACEOUS CYST OF SKIN OF RIGHT BREAST: Primary | ICD-10-CM

## 2025-05-16 PROCEDURE — 99203 OFFICE O/P NEW LOW 30 MIN: CPT | Performed by: SURGERY

## 2025-05-16 NOTE — PROGRESS NOTES
HISTORY OF PRESENT ILLNESS  Jaqueline Singer is a 45 y.o. female     HPI NEW patient consult referred by Michael Savage for two RIGHT breast cysts.    Breast hx-  2015 LEFT breast sebaceous cyst removed.     Family History:  NONE    Mammogram Result (most recent):  [facility] KAVITHA DIGITAL SCREEN BILATERAL 04/23/2024    Narrative  EXAM: [facility] KAVITHA DIGITAL SCREEN BILATERAL  ACC#: [id number]    INDICATION: Encounter for screening mammogram for malignant neoplasm of breast,  []    COMPARISON: Prior mammograms from [id number]    BREAST COMPOSITION: The breasts are extremely dense, which lowers the  sensitivity of mammography.    FINDINGS: Bilateral digital screening mammography was performed and is  interpreted in conjunction with a computer assisted detection (CAD) system.  Additionally, tomosynthesis of both breasts in the CC and MLO projections was  performed. No suspicious masses or calcifications are identified. There has been  no significant change.    Impression  BI-RADS Assessment Category 2: Benign finding. No mammographic evidence of  malignancy.    RECOMMENDATIONS:  Next screening mammogram is recommended in one year.    Lifetime score for Tyrer-Cuzick: 18.16 %    According to the National Cancer Center Network (NCCN) Guidelines, screening MRI  of the breast is recommended if a patient has a lifetime risk of breast cancer  of 20% or greater. High risk patients, especially those with dense breasts, will  benefit from additional clinical management and annual Breast MRI.        Past Medical History:   Diagnosis Date    Anxiety     Autoimmune disease     fibromyalgia    Bandemia 10/2/2020    Dr esquivel, also anemia [facility]    Clotting disorder     thrombocypentia    Depression     and anxiety    Fibromyalgia     GERD (gastroesophageal reflux disease)     Hx of mammogram 03/2021    Hypertension 3/2023     Past Surgical History:   Procedure Laterality Date    COLONOSCOPY  01/19/2021    DENTAL SURGERY  01/24/2022    GYN

## 2025-05-20 ENCOUNTER — TELEPHONE (OUTPATIENT)
Age: 45
End: 2025-05-20

## 2025-05-20 DIAGNOSIS — I10 ESSENTIAL (PRIMARY) HYPERTENSION: Primary | ICD-10-CM

## 2025-05-20 RX ORDER — TRIAMTERENE AND HYDROCHLOROTHIAZIDE 37.5; 25 MG/1; MG/1
1 TABLET ORAL DAILY
Qty: 30 TABLET | Refills: 2 | Status: SHIPPED | OUTPATIENT
Start: 2025-05-20

## 2025-05-21 NOTE — TELEPHONE ENCOUNTER
Spoke to pt and used two pt identifiers.  Patient notified of response from Kristi [place] MD    States understanding

## 2025-05-23 ENCOUNTER — HOSPITAL ENCOUNTER (OUTPATIENT)
Facility: HOSPITAL | Age: 45
Discharge: HOME | End: 2025-05-26
Attending: OBSTETRICS & GYNECOLOGY
Payer: MEDICAID

## 2025-05-23 DIAGNOSIS — Z12.31 ENCOUNTER FOR SCREENING MAMMOGRAM FOR MALIGNANT NEOPLASM OF BREAST: ICD-10-CM

## 2025-05-23 PROCEDURE — 77063 BREAST TOMOSYNTHESIS BI: CPT

## 2025-07-17 ENCOUNTER — TELEPHONE (OUTPATIENT)
Age: 45
End: 2025-07-17

## 2025-07-17 ENCOUNTER — CLINICAL DOCUMENTATION (OUTPATIENT)
Age: 45
End: 2025-07-17

## 2025-07-17 ENCOUNTER — PATIENT MESSAGE (OUTPATIENT)
Age: 45
End: 2025-07-17

## 2025-07-17 DIAGNOSIS — N60.81 SEBACEOUS CYST OF BREAST, RIGHT: Primary | ICD-10-CM

## 2025-07-17 DIAGNOSIS — F51.01 PRIMARY INSOMNIA: Primary | ICD-10-CM

## 2025-07-17 NOTE — PROGRESS NOTES
Patient called and left message on nurse line requesting plastic surgeon referral for sebaceous cyst with discharge. Patient currently deployed.     I called the patient back and patient reports sebaceous cyst x 2, one that started draining last week, causing pain and is tender. Patient would like to get referral to plastic surgeon for removal. 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     Spoke with lead RN. Patient can either make an appt with Shira or one of the surgeons to get referral, request a referral from her PCP or request a referral from [facility]. Patient will try her PCP and will call back if she needs to make an appt with our office.

## 2025-07-17 NOTE — TELEPHONE ENCOUNTER
Swp to confirm 7/24 appt, but pt is currently deployed and needed to r/s. PT doesn't expect to be back until around September, so r/s appt to 9/2. But in the meantime, pt stated is having a really hard time sleeping and with depression and was wondering if pcp could prescribe anything that could help pt until appt. Pt requested Trazodone or something else that may be adam to hold pt over until appt and able to discuss/diagnose further

## 2025-07-17 NOTE — TELEPHONE ENCOUNTER
Sebaceous Cysts - Right Breast    Patient Reported:  Cysts present on both the underside and topside of the right breast, along bra line  Lesions have begun to ooze/drain  Area is painful    Request:  Referral to plastic surgery requested for cyst removal  Procedure is time sensitive -- patient will be on leave for 14 days starting 7/25 and requests it be completed during that period      Call pt to advise.

## 2025-07-20 RX ORDER — TRAZODONE HYDROCHLORIDE 50 MG/1
25 TABLET ORAL NIGHTLY PRN
Qty: 30 TABLET | Refills: 0 | Status: SHIPPED | OUTPATIENT
Start: 2025-07-20

## 2025-07-21 NOTE — TELEPHONE ENCOUNTER
Regarding Referral for Breast Cyst:  Patient reports receiving a call from Dr. Caceres’s office confirming he is a general surgeon, not a plastic surgeon.  Has an appointment scheduled for Friday with Dr. Caceres's practice.  Requests confirmation that Dr. Caceres is qualified to address keloid scar minimization.  Asks if the PCP office can verify with the specialist's practice whether Dr. Caceres--or another provider in the group--has plastic surgery credentials or ability to work with keloid prevention, or refer to an alternate provider if needed.  Expresses strong preference for evaluation by a plastic surgeon due to personal risk of keloid scarring.  Requests that the referral indicate medical necessity due to ongoing drainage, and clarifies the concern is not cosmetic.  States also received call from another plastic surgeon  They states procedure not covered due to \"cosmetic\"  They would need a referral and Requests that the referral indicate medical necessity due to ongoing drainage, and clarifies the concern is not cosmetic.  That specialist info is: [facility], Lee Mathews md  Referred by dr Hernandez (states not sure if they were from the breast center with dr Kori Gregg)    Emphasizes that the matter is time sensitive and requests expedited follow-up.    Please update patient as soon as able.

## 2025-07-22 ENCOUNTER — TELEPHONE (OUTPATIENT)
Age: 45
End: 2025-07-22

## 2025-07-22 NOTE — TELEPHONE ENCOUNTER
----- Message from Keith CASTRO sent at 7/22/2025  4:11 PM EDT -----  Regarding: [facility] Appointment Request  [facility] Appointment Request    Patient needs appointment for [facility] Appointment Type: Annual Visit.    Patient Requested Dates(s): Week of July 28 or Week of Aug. 4  Patient Requested Time: Any  Provider Name:    Kristi Birmingham MD        Reason for Appointment Request: Established Patient - No appointments available during search  --------------------------------------------------------------------------------------------------------------------------    Relationship to Patient: Self     Call Back Information: OK to leave message on voicemail  Preferred Call Back Number: Phone [phone]

## 2025-07-23 ENCOUNTER — TELEPHONE (OUTPATIENT)
Age: 45
End: 2025-07-23

## 2025-07-23 NOTE — TELEPHONE ENCOUNTER
Pt needs to speak with the person she did yesterday about referrals.   She states that someone was going to check into appointment at the cosmetic surgeon.    Pt will need a referral quicker.  Pt has been sent to a general surgeon.      Pt has a lot of questions that needs answers.  Please call pt.

## 2025-07-23 NOTE — TELEPHONE ENCOUNTER
Spoke to pt and used two pt identifiers.  Patient notified of response from Kristi Birmingham MD    States understanding and states she is will call insurance to see if a plastic surgeron is in network but she will keep appt with general just in case due to time being an issue.     Topical Sulfur Applications Pregnancy And Lactation Text: This medication is Pregnancy Category C and has an unknown safety profile during pregnancy. It is unknown if this topical medication is excreted in breast milk.

## 2025-07-23 NOTE — TELEPHONE ENCOUNTER
Spoke to pt and used two pt identifiers.  Patient notified of response from Kristi Birmingham MD     States understanding and states she is will call insurance to see if a plastic surgeron is in network but she will keep appt with general just in case due to time being an issue.

## 2025-07-23 NOTE — TELEPHONE ENCOUNTER
Informed patient that they are currently scheduled for the first available appointment on 9/2/25. Placed patient on cancellation list.

## 2025-07-25 ENCOUNTER — OFFICE VISIT (OUTPATIENT)
Age: 45
End: 2025-07-25

## 2025-07-25 VITALS
DIASTOLIC BLOOD PRESSURE: 73 MMHG | SYSTOLIC BLOOD PRESSURE: 103 MMHG | TEMPERATURE: 99 F | HEART RATE: 89 BPM | BODY MASS INDEX: 30.29 KG/M2 | WEIGHT: 181.8 LBS | OXYGEN SATURATION: 98 % | RESPIRATION RATE: 18 BRPM | HEIGHT: 65 IN

## 2025-07-25 DIAGNOSIS — I10 ESSENTIAL (PRIMARY) HYPERTENSION: ICD-10-CM

## 2025-07-25 DIAGNOSIS — L72.0 EPIDERMOID CYST OF SKIN OF RIGHT BREAST: Primary | ICD-10-CM

## 2025-07-25 DIAGNOSIS — F51.01 PRIMARY INSOMNIA: ICD-10-CM

## 2025-07-25 ASSESSMENT — ENCOUNTER SYMPTOMS
SHORTNESS OF BREATH: 0
STRIDOR: 0
SORE THROAT: 0
WHEEZING: 0
EYE PAIN: 0
VOMITING: 0
ABDOMINAL PAIN: 0
BACK PAIN: 1
NAUSEA: 0
COUGH: 0
BLOOD IN STOOL: 0
CONSTIPATION: 0
DIARRHEA: 0

## 2025-07-25 NOTE — PROGRESS NOTES
Subjective:      Patient ID: Jaqueline Singer is a 45 y.o. female who comes in for consultation by Kristi Birmingham MD for  skin cysts      Chief Complaint   Patient presents with    Cyst     Seen at the request of Devan Torres for cysts of the right breast       HPI    She has 2 cysts in the right breast 1 in the upper breast medially and then 1 in the inferior aspect of the breast medially that are on the skin.  She states that has been there for several years.  Recently though the 1 in the lower aspect of the breast started to drain a little bit and she was concerned about it.  She denies any fevers or chills.  She put some Neosporin on it and it got better.  But she is now concerned that she wants to have them removed.  She previously had a cyst on the left breast removed.  She is also very worried about keloids she was saying she had developed a keloid but I looked at the scars from her prior laparoscopy and they are not truly keloid is just typical scarring.    Past Medical History:   Diagnosis Date    Anxiety     Autoimmune disease     fibromyalgia    Bandemia 10/2/2020    Dr esquivel, also anemia [facility]    Chronic back pain 2023    Clotting disorder     thrombocypentia    Depression     and anxiety    Fibromyalgia     GERD (gastroesophageal reflux disease)     Hx of mammogram 03/2021    Hypertension 3/2023     Past Surgical History:   Procedure Laterality Date    COLONOSCOPY  01/19/2021    DENTAL SURGERY  01/24/2022    GYN      fallopian tube removed after tubal pregnancy    GYN      hysteroscopy/laporoscopy to remove schar tissue    OTHER SURGICAL HISTORY      oral surgery for wisdom teeth, root canal     Family History   Problem Relation Age of Onset    Psychiatric Disorder Mother         depression/anxiety    Asthma Mother     Hypertension Mother     Depression Mother     Hypertension Paternal Grandmother     Stroke Paternal Grandmother     Diabetes Paternal Grandmother     Cancer Paternal

## 2025-07-25 NOTE — PROGRESS NOTES
Identified pt with two pt identifiers (name and ). Reviewed chart in preparation for visit and have obtained necessary documentation.    Jaqueline Singer is a 45 y.o. female  Chief Complaint   Patient presents with    Cyst     Seen at the request of Devan Torres for cysts of the right breast     /73 (BP Site: Left Upper Arm, Patient Position: Sitting, BP Cuff Size: Large Adult)   Pulse 89   Temp 99 °F (37.2 °C) (Oral)   Resp 18   Ht 1.651 m (5' 5\")   Wt 82.5 kg (181 lb 12.8 oz)   LMP 2025 (Exact Date)   SpO2 98%   BMI 30.25 kg/m²     1. Have you been to the ER, urgent care clinic since your last visit?  Hospitalized since your last visit?no    2. Have you seen or consulted any other health care providers outside of the [facility] since your last visit?  Include any pap smears or colon screening. no

## 2025-07-25 NOTE — TELEPHONE ENCOUNTER
Pt states would like an update on [employer] message as soon as possible, states not sure if should go to appt today.    Please call to discuss.

## 2025-07-28 RX ORDER — TRAZODONE HYDROCHLORIDE 50 MG/1
TABLET ORAL
Qty: 30 TABLET | Refills: 0 | Status: SHIPPED | OUTPATIENT
Start: 2025-07-28

## 2025-07-28 RX ORDER — TRIAMTERENE AND HYDROCHLOROTHIAZIDE 37.5; 25 MG/1; MG/1
1 TABLET ORAL DAILY
Qty: 30 TABLET | Refills: 0 | Status: SHIPPED | OUTPATIENT
Start: 2025-07-28 | End: 2025-07-30 | Stop reason: SDUPTHER

## 2025-07-30 DIAGNOSIS — I10 ESSENTIAL (PRIMARY) HYPERTENSION: ICD-10-CM

## 2025-07-30 DIAGNOSIS — E55.9 VITAMIN D DEFICIENCY: ICD-10-CM

## 2025-07-31 RX ORDER — TRIAMTERENE AND HYDROCHLOROTHIAZIDE 37.5; 25 MG/1; MG/1
1 TABLET ORAL DAILY
Qty: 30 TABLET | Refills: 0 | Status: SHIPPED | OUTPATIENT
Start: 2025-07-31

## 2025-07-31 RX ORDER — ERGOCALCIFEROL 1.25 MG/1
50000 CAPSULE, LIQUID FILLED ORAL
Qty: 13 CAPSULE | Refills: 0 | Status: SHIPPED | OUTPATIENT
Start: 2025-07-31

## 2025-08-05 RX ORDER — KETOCONAZOLE 20 MG/ML
SHAMPOO, SUSPENSION TOPICAL
Qty: 120 ML | Refills: 0 | Status: SHIPPED | OUTPATIENT
Start: 2025-08-05

## 2025-08-06 ENCOUNTER — TELEPHONE (OUTPATIENT)
Age: 45
End: 2025-08-06

## 2025-08-06 ENCOUNTER — HOSPITAL ENCOUNTER (OUTPATIENT)
Facility: HOSPITAL | Age: 45
Discharge: HOME | End: 2025-08-09

## 2025-08-06 DIAGNOSIS — I10 ESSENTIAL (PRIMARY) HYPERTENSION: ICD-10-CM

## 2025-08-07 LAB
ALBUMIN SERPL-MCNC: 4.2 G/DL (ref 3.9–4.9)
BUN SERPL-MCNC: 10 MG/DL (ref 6–24)
BUN/CREAT SERPL: 10 (ref 9–23)
CALCIUM SERPL-MCNC: 9.8 MG/DL (ref 8.7–10.2)
CHLORIDE SERPL-SCNC: 100 MMOL/L (ref 96–106)
CO2 SERPL-SCNC: 21 MMOL/L (ref 20–29)
CREAT SERPL-MCNC: 0.97 MG/DL (ref 0.57–1)
EGFRCR SERPLBLD CKD-EPI 2021: 73 ML/MIN/1.73
GLUCOSE SERPL-MCNC: 95 MG/DL (ref 70–99)
PHOSPHATE SERPL-MCNC: 4.4 MG/DL (ref 3–4.3)
POTASSIUM SERPL-SCNC: 4.4 MMOL/L (ref 3.5–5.2)
SODIUM SERPL-SCNC: 138 MMOL/L (ref 134–144)